# Patient Record
Sex: FEMALE | Race: WHITE | NOT HISPANIC OR LATINO
[De-identification: names, ages, dates, MRNs, and addresses within clinical notes are randomized per-mention and may not be internally consistent; named-entity substitution may affect disease eponyms.]

---

## 2017-05-01 ENCOUNTER — TRANSCRIPTION ENCOUNTER (OUTPATIENT)
Age: 72
End: 2017-05-01

## 2017-05-17 ENCOUNTER — TRANSCRIPTION ENCOUNTER (OUTPATIENT)
Age: 72
End: 2017-05-17

## 2017-05-26 ENCOUNTER — TRANSCRIPTION ENCOUNTER (OUTPATIENT)
Age: 72
End: 2017-05-26

## 2017-08-26 ENCOUNTER — EMERGENCY (EMERGENCY)
Facility: HOSPITAL | Age: 72
LOS: 0 days | Discharge: HOME | End: 2017-08-26
Attending: EMERGENCY MEDICINE | Admitting: EMERGENCY MEDICINE
Payer: MEDICARE

## 2017-08-26 VITALS
HEIGHT: 61 IN | DIASTOLIC BLOOD PRESSURE: 90 MMHG | OXYGEN SATURATION: 97 % | TEMPERATURE: 98 F | WEIGHT: 108.03 LBS | RESPIRATION RATE: 18 BRPM | HEART RATE: 91 BPM | SYSTOLIC BLOOD PRESSURE: 128 MMHG

## 2017-08-26 DIAGNOSIS — E03.9 HYPOTHYROIDISM, UNSPECIFIED: ICD-10-CM

## 2017-08-26 DIAGNOSIS — I10 ESSENTIAL (PRIMARY) HYPERTENSION: ICD-10-CM

## 2017-08-26 DIAGNOSIS — I47.1 SUPRAVENTRICULAR TACHYCARDIA: ICD-10-CM

## 2017-08-26 DIAGNOSIS — R00.0 TACHYCARDIA, UNSPECIFIED: ICD-10-CM

## 2017-08-26 DIAGNOSIS — Z88.5 ALLERGY STATUS TO NARCOTIC AGENT: ICD-10-CM

## 2017-08-26 LAB
ALBUMIN SERPL ELPH-MCNC: 3.6 G/DL — SIGNIFICANT CHANGE UP (ref 3.3–5)
ALP SERPL-CCNC: 48 U/L — SIGNIFICANT CHANGE UP (ref 40–120)
ALT FLD-CCNC: 20 U/L — SIGNIFICANT CHANGE UP (ref 12–78)
ANION GAP SERPL CALC-SCNC: 10 MMOL/L — SIGNIFICANT CHANGE UP (ref 5–17)
APPEARANCE UR: CLEAR — SIGNIFICANT CHANGE UP
APTT BLD: 37.6 SEC — HIGH (ref 27.5–37.4)
AST SERPL-CCNC: 16 U/L — SIGNIFICANT CHANGE UP (ref 15–37)
BACTERIA # UR AUTO: (no result)
BASOPHILS # BLD AUTO: 0.1 K/UL — SIGNIFICANT CHANGE UP (ref 0–0.2)
BASOPHILS NFR BLD AUTO: 1.5 % — SIGNIFICANT CHANGE UP (ref 0–2)
BILIRUB SERPL-MCNC: 0.3 MG/DL — SIGNIFICANT CHANGE UP (ref 0.2–1.2)
BILIRUB UR-MCNC: NEGATIVE — SIGNIFICANT CHANGE UP
BUN SERPL-MCNC: 19 MG/DL — SIGNIFICANT CHANGE UP (ref 7–23)
CALCIUM SERPL-MCNC: 9.4 MG/DL — SIGNIFICANT CHANGE UP (ref 8.5–10.1)
CHLORIDE SERPL-SCNC: 113 MMOL/L — HIGH (ref 96–108)
CO2 SERPL-SCNC: 21 MMOL/L — LOW (ref 22–31)
COLOR SPEC: YELLOW — SIGNIFICANT CHANGE UP
CREAT SERPL-MCNC: 0.84 MG/DL — SIGNIFICANT CHANGE UP (ref 0.5–1.3)
D DIMER BLD IA.RAPID-MCNC: 165 NG/ML DDU — SIGNIFICANT CHANGE UP
DIFF PNL FLD: NEGATIVE — SIGNIFICANT CHANGE UP
EOSINOPHIL # BLD AUTO: 0.1 K/UL — SIGNIFICANT CHANGE UP (ref 0–0.5)
EOSINOPHIL NFR BLD AUTO: 1.2 % — SIGNIFICANT CHANGE UP (ref 0–6)
EPI CELLS # UR: SIGNIFICANT CHANGE UP
GLUCOSE SERPL-MCNC: 142 MG/DL — HIGH (ref 70–99)
GLUCOSE UR QL: NEGATIVE MG/DL — SIGNIFICANT CHANGE UP
HCT VFR BLD CALC: 40.1 % — SIGNIFICANT CHANGE UP (ref 34.5–45)
HGB BLD-MCNC: 14 G/DL — SIGNIFICANT CHANGE UP (ref 11.5–15.5)
INR BLD: 0.99 RATIO — SIGNIFICANT CHANGE UP (ref 0.88–1.16)
KETONES UR-MCNC: NEGATIVE — SIGNIFICANT CHANGE UP
LACTATE SERPL-SCNC: 1.6 MMOL/L — SIGNIFICANT CHANGE UP (ref 0.7–2)
LEUKOCYTE ESTERASE UR-ACNC: (no result)
LYMPHOCYTES # BLD AUTO: 2.5 K/UL — SIGNIFICANT CHANGE UP (ref 1–3.3)
LYMPHOCYTES # BLD AUTO: 40.5 % — SIGNIFICANT CHANGE UP (ref 13–44)
MAGNESIUM SERPL-MCNC: 2 MG/DL — SIGNIFICANT CHANGE UP (ref 1.6–2.6)
MCHC RBC-ENTMCNC: 31.2 PG — SIGNIFICANT CHANGE UP (ref 27–34)
MCHC RBC-ENTMCNC: 34.8 GM/DL — SIGNIFICANT CHANGE UP (ref 32–36)
MCV RBC AUTO: 89.5 FL — SIGNIFICANT CHANGE UP (ref 80–100)
MONOCYTES # BLD AUTO: 0.5 K/UL — SIGNIFICANT CHANGE UP (ref 0–0.9)
MONOCYTES NFR BLD AUTO: 8.8 % — SIGNIFICANT CHANGE UP (ref 2–14)
NEUTROPHILS # BLD AUTO: 3 K/UL — SIGNIFICANT CHANGE UP (ref 1.8–7.4)
NEUTROPHILS NFR BLD AUTO: 48.1 % — SIGNIFICANT CHANGE UP (ref 43–77)
NITRITE UR-MCNC: NEGATIVE — SIGNIFICANT CHANGE UP
PH UR: 6.5 — SIGNIFICANT CHANGE UP (ref 5–8)
PLATELET # BLD AUTO: 292 K/UL — SIGNIFICANT CHANGE UP (ref 150–400)
POTASSIUM SERPL-MCNC: 3.9 MMOL/L — SIGNIFICANT CHANGE UP (ref 3.5–5.3)
POTASSIUM SERPL-SCNC: 3.9 MMOL/L — SIGNIFICANT CHANGE UP (ref 3.5–5.3)
PROT SERPL-MCNC: 7.1 GM/DL — SIGNIFICANT CHANGE UP (ref 6–8.3)
PROT UR-MCNC: NEGATIVE MG/DL — SIGNIFICANT CHANGE UP
PROTHROM AB SERPL-ACNC: 10.7 SEC — SIGNIFICANT CHANGE UP (ref 9.8–12.7)
RBC # BLD: 4.48 M/UL — SIGNIFICANT CHANGE UP (ref 3.8–5.2)
RBC # FLD: 11.7 % — SIGNIFICANT CHANGE UP (ref 10.3–14.5)
RBC CASTS # UR COMP ASSIST: NEGATIVE /HPF — SIGNIFICANT CHANGE UP (ref 0–4)
SODIUM SERPL-SCNC: 144 MMOL/L — SIGNIFICANT CHANGE UP (ref 135–145)
SP GR SPEC: 1.01 — SIGNIFICANT CHANGE UP (ref 1.01–1.02)
TROPONIN I SERPL-MCNC: 0.03 NG/ML — SIGNIFICANT CHANGE UP (ref 0.01–0.04)
TROPONIN I SERPL-MCNC: <0.015 NG/ML — SIGNIFICANT CHANGE UP (ref 0.01–0.04)
TSH SERPL-MCNC: 0.01 UIU/ML — LOW (ref 0.36–3.74)
UROBILINOGEN FLD QL: NEGATIVE MG/DL — SIGNIFICANT CHANGE UP
WBC # BLD: 6.2 K/UL — SIGNIFICANT CHANGE UP (ref 3.8–10.5)
WBC # FLD AUTO: 6.2 K/UL — SIGNIFICANT CHANGE UP (ref 3.8–10.5)
WBC UR QL: SIGNIFICANT CHANGE UP

## 2017-08-26 PROCEDURE — 99284 EMERGENCY DEPT VISIT MOD MDM: CPT | Mod: 25

## 2017-08-26 PROCEDURE — 93010 ELECTROCARDIOGRAM REPORT: CPT

## 2017-08-26 PROCEDURE — 71010: CPT | Mod: 26

## 2017-08-26 NOTE — ED ADULT NURSE NOTE - ED STAT RN HANDOFF DETAILS
Received report from MARTY Syed and reassessed patient. Agree with the previous RN assessment. Patient is in NSR, reports no repeat or new episodes of palpitations, shortness of breath, or chest pain. Patient is speaking with family at bedside, awaiting repeat sets of cardiac enzymes. Will continue to monitor/reassess.

## 2017-08-26 NOTE — ED PROVIDER NOTE - OBJECTIVE STATEMENT
72 y/o female with PMHx of  HTN, hypothyroidism presents to the ED c/o rapid heart beat beginning 3 hours ago.  Heart rate suddenly increased to 165 bpm.  This has happened before twice in the past, once when she had to be given adenosine.  +SOB, +palpitations.  Denies any pain.  She's had a cardiac stress test with no complications.  Has not taken ASA.  Nonsmoker, no drinking . Cardiologist Dr. Alvarez.

## 2018-01-08 ENCOUNTER — TRANSCRIPTION ENCOUNTER (OUTPATIENT)
Age: 73
End: 2018-01-08

## 2018-02-24 NOTE — ED ADULT NURSE REASSESSMENT NOTE - COMFORT CARE
side rails up/EKG DONE. MD SIGNED. NOTHING ELSE NEEDED.- BG
plan of care explained
plan of care explained/warm blanket provided/assisted to bedpan
mouth

## 2018-05-03 ENCOUNTER — TRANSCRIPTION ENCOUNTER (OUTPATIENT)
Age: 73
End: 2018-05-03

## 2018-05-05 ENCOUNTER — TRANSCRIPTION ENCOUNTER (OUTPATIENT)
Age: 73
End: 2018-05-05

## 2019-07-22 ENCOUNTER — TRANSCRIPTION ENCOUNTER (OUTPATIENT)
Age: 74
End: 2019-07-22

## 2019-12-26 ENCOUNTER — TRANSCRIPTION ENCOUNTER (OUTPATIENT)
Age: 74
End: 2019-12-26

## 2020-08-22 ENCOUNTER — TRANSCRIPTION ENCOUNTER (OUTPATIENT)
Age: 75
End: 2020-08-22

## 2020-08-22 ENCOUNTER — EMERGENCY (EMERGENCY)
Facility: HOSPITAL | Age: 75
LOS: 0 days | Discharge: HOME | End: 2020-08-22
Attending: STUDENT IN AN ORGANIZED HEALTH CARE EDUCATION/TRAINING PROGRAM
Payer: MEDICARE

## 2020-08-22 VITALS
RESPIRATION RATE: 18 BRPM | WEIGHT: 100.09 LBS | HEIGHT: 60 IN | DIASTOLIC BLOOD PRESSURE: 87 MMHG | OXYGEN SATURATION: 98 % | TEMPERATURE: 99 F | HEART RATE: 66 BPM | SYSTOLIC BLOOD PRESSURE: 147 MMHG
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
## 2020-08-22 DIAGNOSIS — Z88.5 ALLERGY STATUS TO NARCOTIC AGENT: ICD-10-CM
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
## 2020-08-22 DIAGNOSIS — S09.90XA UNSPECIFIED INJURY OF HEAD, INITIAL ENCOUNTER: ICD-10-CM
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
## 2020-08-22 DIAGNOSIS — R11.0 NAUSEA: ICD-10-CM

## 2020-08-22 DIAGNOSIS — Z79.818 LONG TERM (CURRENT) USE OF OTHER AGENTS AFFECTING ESTROGEN RECEPTORS AND ESTROGEN LEVELS: ICD-10-CM

## 2020-08-22 DIAGNOSIS — E03.9 HYPOTHYROIDISM, UNSPECIFIED: ICD-10-CM

## 2020-08-22 DIAGNOSIS — R51 HEADACHE: ICD-10-CM

## 2020-08-22 DIAGNOSIS — I10 ESSENTIAL (PRIMARY) HYPERTENSION: ICD-10-CM

## 2020-08-22 PROCEDURE — 70450 CT HEAD/BRAIN W/O DYE: CPT

## 2020-08-22 PROCEDURE — 99284 EMERGENCY DEPT VISIT MOD MDM: CPT

## 2020-08-22 PROCEDURE — 99284 EMERGENCY DEPT VISIT MOD MDM: CPT | Mod: 25

## 2020-08-22 PROCEDURE — 70450 CT HEAD/BRAIN W/O DYE: CPT | Mod: 26

## 2020-08-22 RX ORDER — ACETAMINOPHEN 500 MG
650 TABLET ORAL ONCE
Refills: 0 | Status: COMPLETED | OUTPATIENT
Start: 2020-08-22 | End: 2020-08-22

## 2020-08-22 RX ADMIN — Medication 650 MILLIGRAM(S): at 10:50

## 2020-08-22 NOTE — ED PROVIDER NOTE - CLINICAL SUMMARY MEDICAL DECISION MAKING FREE TEXT BOX
73 y/o female with closed head injury, CT, pain control. 73 y/o female with closed head injury, CT head, pain control.

## 2020-08-22 NOTE — ED ADULT NURSE NOTE - NSIMPLEMENTINTERV_GEN_ALL_ED
Implemented All Universal Safety Interventions:  [place] to call system. Call bell, personal items and telephone within reach. Instruct patient to call for assistance. Room bathroom lighting operational. Non-slip footwear when patient is off stretcher. Physically safe environment: no spills, clutter or unnecessary equipment. Stretcher in lowest position, wheels locked, appropriate side rails in place.

## 2020-08-22 NOTE — ED ADULT TRIAGE NOTE - CHIEF COMPLAINT QUOTE
pt presents to Ed with complaints of head pain. pt states she hit her head on something yesterday while cleaning her house.

## 2020-08-22 NOTE — ED PROVIDER NOTE - PROGRESS NOTE DETAILS
Aaron ROACH: Patient is feeling better at this time; ct head neg; tylenol prn pain; ambulatory in ed with steady gait; f/u with pmd in 1-2 days without fail; strict return precautions given.

## 2020-08-22 NOTE — ED PROVIDER NOTE - PATIENT PORTAL LINK FT
You can access the FollowMyHealth Patient Portal offered by [facility] by registering at the following website: [email or web address]. By joining [employer]’s FollowMyHealth portal, you will also be able to view your health information using other applications (apps) compatible with our system.

## 2020-08-22 NOTE — ED PROVIDER NOTE - OBJECTIVE STATEMENT
73 y/o female with a PMHx of HTN, Hypothyroidism on Synthroid, presents to the ED c/o head injury yesterday. Pt states she hit her head against "something" while cleaning her basement. No LOC. Reports pain to R side of her head, radiating down to R shoulder, and pain is worsening. +associated nausea. No new vision changes, vomiting. No Took Tylenol yesterday. Not on blood thinners or aspirin.

## 2020-08-22 NOTE — ED ADULT NURSE NOTE - OBJECTIVE STATEMENT
patient sates she hit the top of her head yesterday while cleaning.  She denies LOC, dizziness (has vertigo at baseline), vision changes, no blood thinners.  She has mild nausea.  +HA

## 2020-12-24 ENCOUNTER — TRANSCRIPTION ENCOUNTER (OUTPATIENT)
Age: 75
End: 2020-12-24

## 2020-12-24 ENCOUNTER — EMERGENCY (EMERGENCY)
Facility: HOSPITAL | Age: 75
LOS: 0 days | Discharge: HOME | End: 2020-12-24
Attending: EMERGENCY MEDICINE
Payer: MEDICARE

## 2020-12-24 VITALS
RESPIRATION RATE: 15 BRPM | SYSTOLIC BLOOD PRESSURE: 137 MMHG | HEIGHT: 60 IN | TEMPERATURE: 98 F | OXYGEN SATURATION: 100 % | HEART RATE: 67 BPM | DIASTOLIC BLOOD PRESSURE: 95 MMHG | WEIGHT: 110.01 LBS

## 2020-12-24 VITALS
DIASTOLIC BLOOD PRESSURE: 88 MMHG | OXYGEN SATURATION: 98 % | TEMPERATURE: 98 F | HEART RATE: 61 BPM | RESPIRATION RATE: 17 BRPM | SYSTOLIC BLOOD PRESSURE: 119 MMHG

## 2020-12-24 DIAGNOSIS — M54.2 CERVICALGIA: ICD-10-CM

## 2020-12-24 DIAGNOSIS — R51.9 HEADACHE, UNSPECIFIED: ICD-10-CM

## 2020-12-24 DIAGNOSIS — E03.9 HYPOTHYROIDISM, UNSPECIFIED: ICD-10-CM

## 2020-12-24 DIAGNOSIS — H92.02 OTALGIA, LEFT EAR: ICD-10-CM

## 2020-12-24 DIAGNOSIS — Z88.5 ALLERGY STATUS TO NARCOTIC AGENT: ICD-10-CM

## 2020-12-24 DIAGNOSIS — I10 ESSENTIAL (PRIMARY) HYPERTENSION: ICD-10-CM

## 2020-12-24 DIAGNOSIS — R00.2 PALPITATIONS: ICD-10-CM

## 2020-12-24 LAB
ALBUMIN SERPL ELPH-MCNC: 3.9 G/DL — SIGNIFICANT CHANGE UP (ref 3.3–5)
ALP SERPL-CCNC: 47 U/L — SIGNIFICANT CHANGE UP (ref 40–120)
ALT FLD-CCNC: 22 U/L — SIGNIFICANT CHANGE UP (ref 12–78)
ANION GAP SERPL CALC-SCNC: 5 MMOL/L — SIGNIFICANT CHANGE UP (ref 5–17)
AST SERPL-CCNC: 21 U/L — SIGNIFICANT CHANGE UP (ref 15–37)
BASOPHILS # BLD AUTO: 0.04 K/UL — SIGNIFICANT CHANGE UP (ref 0–0.2)
BASOPHILS NFR BLD AUTO: 0.8 % — SIGNIFICANT CHANGE UP (ref 0–2)
BILIRUB SERPL-MCNC: 1 MG/DL — SIGNIFICANT CHANGE UP (ref 0.2–1.2)
BUN SERPL-MCNC: 16 MG/DL — SIGNIFICANT CHANGE UP (ref 7–23)
CALCIUM SERPL-MCNC: 9.6 MG/DL — SIGNIFICANT CHANGE UP (ref 8.5–10.1)
CHLORIDE SERPL-SCNC: 110 MMOL/L — HIGH (ref 96–108)
CO2 SERPL-SCNC: 26 MMOL/L — SIGNIFICANT CHANGE UP (ref 22–31)
CREAT SERPL-MCNC: 0.79 MG/DL — SIGNIFICANT CHANGE UP (ref 0.5–1.3)
EOSINOPHIL # BLD AUTO: 0.02 K/UL — SIGNIFICANT CHANGE UP (ref 0–0.5)
EOSINOPHIL NFR BLD AUTO: 0.4 % — SIGNIFICANT CHANGE UP (ref 0–6)
GLUCOSE SERPL-MCNC: 87 MG/DL — SIGNIFICANT CHANGE UP (ref 70–99)
HCT VFR BLD CALC: 40.6 % — SIGNIFICANT CHANGE UP (ref 34.5–45)
HGB BLD-MCNC: 13.4 G/DL — SIGNIFICANT CHANGE UP (ref 11.5–15.5)
IMM GRANULOCYTES NFR BLD AUTO: 0.2 % — SIGNIFICANT CHANGE UP (ref 0–1.5)
LYMPHOCYTES # BLD AUTO: 1.9 K/UL — SIGNIFICANT CHANGE UP (ref 1–3.3)
LYMPHOCYTES # BLD AUTO: 39 % — SIGNIFICANT CHANGE UP (ref 13–44)
MAGNESIUM SERPL-MCNC: 2.2 MG/DL — SIGNIFICANT CHANGE UP (ref 1.6–2.6)
MCHC RBC-ENTMCNC: 31.4 PG — SIGNIFICANT CHANGE UP (ref 27–34)
MCHC RBC-ENTMCNC: 33 GM/DL — SIGNIFICANT CHANGE UP (ref 32–36)
MCV RBC AUTO: 95.1 FL — SIGNIFICANT CHANGE UP (ref 80–100)
MONOCYTES # BLD AUTO: 0.49 K/UL — SIGNIFICANT CHANGE UP (ref 0–0.9)
MONOCYTES NFR BLD AUTO: 10.1 % — SIGNIFICANT CHANGE UP (ref 2–14)
NEUTROPHILS # BLD AUTO: 2.41 K/UL — SIGNIFICANT CHANGE UP (ref 1.8–7.4)
NEUTROPHILS NFR BLD AUTO: 49.5 % — SIGNIFICANT CHANGE UP (ref 43–77)
PHOSPHATE SERPL-MCNC: 3.2 MG/DL — SIGNIFICANT CHANGE UP (ref 2.5–4.5)
PLATELET # BLD AUTO: 269 K/UL — SIGNIFICANT CHANGE UP (ref 150–400)
POTASSIUM SERPL-MCNC: 4.1 MMOL/L — SIGNIFICANT CHANGE UP (ref 3.5–5.3)
POTASSIUM SERPL-SCNC: 4.1 MMOL/L — SIGNIFICANT CHANGE UP (ref 3.5–5.3)
PROT SERPL-MCNC: 7.4 GM/DL — SIGNIFICANT CHANGE UP (ref 6–8.3)
RBC # BLD: 4.27 M/UL — SIGNIFICANT CHANGE UP (ref 3.8–5.2)
RBC # FLD: 13 % — SIGNIFICANT CHANGE UP (ref 10.3–14.5)
SODIUM SERPL-SCNC: 141 MMOL/L — SIGNIFICANT CHANGE UP (ref 135–145)
TROPONIN I SERPL-MCNC: <0.015 NG/ML — SIGNIFICANT CHANGE UP (ref 0.01–0.04)
WBC # BLD: 4.87 K/UL — SIGNIFICANT CHANGE UP (ref 3.8–10.5)
WBC # FLD AUTO: 4.87 K/UL — SIGNIFICANT CHANGE UP (ref 3.8–10.5)

## 2020-12-24 PROCEDURE — 71046 X-RAY EXAM CHEST 2 VIEWS: CPT | Mod: 26

## 2020-12-24 PROCEDURE — 71046 X-RAY EXAM CHEST 2 VIEWS: CPT

## 2020-12-24 PROCEDURE — 72125 CT NECK SPINE W/O DYE: CPT | Mod: 26

## 2020-12-24 PROCEDURE — 84100 ASSAY OF PHOSPHORUS: CPT

## 2020-12-24 PROCEDURE — 70450 CT HEAD/BRAIN W/O DYE: CPT | Mod: 26

## 2020-12-24 PROCEDURE — 83880 ASSAY OF NATRIURETIC PEPTIDE: CPT

## 2020-12-24 PROCEDURE — 70450 CT HEAD/BRAIN W/O DYE: CPT

## 2020-12-24 PROCEDURE — 99284 EMERGENCY DEPT VISIT MOD MDM: CPT | Mod: 25

## 2020-12-24 PROCEDURE — 36415 COLL VENOUS BLD VENIPUNCTURE: CPT

## 2020-12-24 PROCEDURE — 72125 CT NECK SPINE W/O DYE: CPT

## 2020-12-24 PROCEDURE — 99284 EMERGENCY DEPT VISIT MOD MDM: CPT

## 2020-12-24 PROCEDURE — 83735 ASSAY OF MAGNESIUM: CPT

## 2020-12-24 PROCEDURE — 84484 ASSAY OF TROPONIN QUANT: CPT | Mod: 91

## 2020-12-24 PROCEDURE — 80053 COMPREHEN METABOLIC PANEL: CPT

## 2020-12-24 PROCEDURE — 85025 COMPLETE CBC W/AUTO DIFF WBC: CPT

## 2020-12-24 RX ORDER — CYCLOBENZAPRINE HYDROCHLORIDE 10 MG/1
10 TABLET, FILM COATED ORAL ONCE
Refills: 0 | Status: COMPLETED | OUTPATIENT
Start: 2020-12-24 | End: 2020-12-24

## 2020-12-24 RX ORDER — ACETAMINOPHEN 500 MG
1000 TABLET ORAL ONCE
Refills: 0 | Status: COMPLETED | OUTPATIENT
Start: 2020-12-24 | End: 2020-12-24

## 2020-12-24 RX ORDER — METHOCARBAMOL 500 MG/1
1500 TABLET, FILM COATED ORAL
Qty: 24 | Refills: 0
Start: 2020-12-24 | End: 2020-12-26

## 2020-12-24 RX ORDER — MECLIZINE HCL 12.5 MG
1 TABLET ORAL
Qty: 8 | Refills: 0
Start: 2020-12-24 | End: 2020-12-25

## 2020-12-24 RX ORDER — METHOCARBAMOL 500 MG/1
1 TABLET, FILM COATED ORAL
Qty: 12 | Refills: 0
Start: 2020-12-24 | End: 2020-12-26

## 2020-12-24 RX ADMIN — CYCLOBENZAPRINE HYDROCHLORIDE 10 MILLIGRAM(S): 10 TABLET, FILM COATED ORAL at 12:34

## 2020-12-24 RX ADMIN — Medication 1000 MILLIGRAM(S): at 12:33

## 2020-12-24 NOTE — ED STATDOCS - OBJECTIVE STATEMENT
76 y/o female with pmhx of hypothyroid, and HTN presents to the ED c/o HA radiating down to th neck and arms. Pt states that the pain started in her L ear x1 month ago. moving head worsens pain. pt states that the pain starts when she wakes up in the morning and worsens as the day goes on. Pt was seen at  today and was sent to the ED for further eval. Noise worsens pain.  Endorses SOB and palpitations. EKG at  was NSR 60. Denies V/D, fever. No other complaints at this time.

## 2020-12-24 NOTE — ED STATDOCS - PATIENT PORTAL LINK FT
You can access the FollowMyHealth Patient Portal offered by [facility] by registering at the following website: [email or web address]. By joining [employer]’s FollowMyHealth portal, you will also be able to view your health information using other applications (apps) compatible with our system.

## 2020-12-24 NOTE — ED STATDOCS - MUSCULOSKELETAL, MLM
range of motion is not limited and there is no muscle tenderness. +ttp L trapezius muscle range of motion is not limited and there is no muscle tenderness. +ttp L trapezius muscle, no midline neck tenderness no reginity range of motion is not limited and there is no muscle tenderness. +ttp L trapezius muscle, no midline neck tenderness no rigidity

## 2020-12-24 NOTE — ED STATDOCS - NSFOLLOWUPINSTRUCTIONS_ED_ALL_ED_FT
Please follow up with Primary MD in 1-2 days. Return to ED immediately for any new or concerning symptoms or worsening symptoms    Acute Headache    WHAT YOU NEED TO KNOW:    An acute headache is pain or discomfort that starts suddenly and gets worse quickly. You may have an acute headache only when you feel stress or eat certain foods. Other acute headache pain can happen every day, and sometimes several times a day.     DISCHARGE INSTRUCTIONS:    Return to the emergency department if:     You have severe pain.      You have numbness or weakness on one side of your face or body.      You have a headache that occurs after a blow to the head, a fall, or other trauma.       You have a headache, are forgetful or confused, or have trouble speaking.      You have a headache, stiff neck, and a fever.    Contact your healthcare provider if:     You have a constant headache and are vomiting.      You have a headache each day that does not get better, even after treatment.      You have changes in your headaches, or new symptoms that occur when you have a headache.      You have questions or concerns about your condition or care.    Medicines: You may need any of the following:     Prescription pain medicine may be given. The medicine your healthcare provider recommends will depend on the kind of headaches you have. You will need to take prescription headache medicines as directed to prevent a problem called rebound headache. These headaches happen with regular use of pain relievers for headache disorders.      NSAIDs, such as ibuprofen, help decrease swelling, pain, and fever. This medicine is available with or without a doctor's order. NSAIDs can cause stomach bleeding or kidney problems in certain people. If you take blood thinner medicine, always ask your healthcare provider if NSAIDs are safe for you. Always read the medicine label and follow directions.      Acetaminophen decreases pain and fever. It is available without a doctor's order. Ask how much to take and how often to take it. Follow directions. Read the labels of all other medicines you are using to see if they also contain acetaminophen, or ask your doctor or pharmacist. Acetaminophen can cause liver damage if not taken correctly. Do not use more than 3 grams (3,000 milligrams) total of acetaminophen in one day.       Antidepressants may be given for some kinds of headaches.       Take your medicine as directed. Contact your healthcare provider if you think your medicine is not helping or if you have side effects. Tell him or her if you are allergic to any medicine. Keep a list of the medicines, vitamins, and herbs you take. Include the amounts, and when and why you take them. Bring the list or the pill bottles to follow-up visits. Carry your medicine list with you in case of an emergency.    Manage your symptoms:     Apply heat or ice on the headache area. Use a heat or ice pack. For an ice pack, you can also put crushed ice in a plastic bag. Cover the pack or bag with a towel before you apply it to your skin. Ice and heat both help decrease pain, and heat also helps decrease muscle spasms. Apply heat for 20 to 30 minutes every 2 hours. Apply ice for 15 to 20 minutes every hour. Apply heat or ice for as long and for as many days as directed. You may alternate heat and ice.      Relax your muscles. Lie down in a comfortable position and close your eyes. Relax your muscles slowly. Start at your toes and work your way up your body.      Keep a record of your headaches. Write down when your headaches start and stop. Include your symptoms and what you were doing when the headache began. Record what you ate or drank for 24 hours before the headache started. Describe the pain and where it hurts. Keep track of what you did to treat your headache and if it worked.     Prevent an acute headache:     Avoid anything that triggers an acute headache. Examples include exposure to chemicals, going to high altitude, or not getting enough sleep. Create a regular sleep routine. Go to sleep at the same time and wake up at the same time each day. Do not use electronic devices before bedtime. These may trigger a headache or prevent you from sleeping well.      Do not smoke. Nicotine and other chemicals in cigarettes and cigars can trigger an acute headache or make it worse. Ask your healthcare provider for information if you currently smoke and need help to quit. E-cigarettes or smokeless tobacco still contain nicotine. Talk to your healthcare provider before you use these products.       Limit alcohol as directed. Alcohol can trigger an acute headache or make it worse. If you have cluster headaches, do not drink alcohol during an episode. For other types of headaches, ask your healthcare provider if it is safe for you to drink alcohol. Ask how much is safe for you to drink, and how often.      Exercise as directed. Exercise can reduce tension and help with headache pain. Aim for 30 minutes of physical activity on most days of the week. Your healthcare provider can help you create an exercise plan.      Eat a variety of healthy foods. Healthy foods include fruits, vegetables, low-fat dairy products, lean meats, fish, whole grains, and cooked beans. Your healthcare provider or dietitian can help you create meals plans if you need to avoid foods that trigger headaches.    Follow up with your healthcare provider as directed: Bring your headache record with you when you see your healthcare provider. Write down your questions so you remember to ask them during your visits.

## 2020-12-24 NOTE — ED STATDOCS - CLINICAL SUMMARY MEDICAL DECISION MAKING FREE TEXT BOX
Pt presents with HA associate with neck pain and palpitations, plan: CT, check labs, follow up with PCP.

## 2020-12-24 NOTE — ED STATDOCS - CARE PROVIDER_API CALL
Ronen Douglas)  Orthopaedic Surgery  [address] [place] [address]  Phone: [phone]  Fax: [phone]  Follow Up Time: 7-10 Days

## 2020-12-24 NOTE — ED STATDOCS - PROGRESS NOTE DETAILS
76 y/o F presents with HA x 1 week. Pt reports intermittent HA and neck pain radiating down to her shoulder, pain is worse with movement and becomes worse during the day. Denies fever/chills, n/v, slurred speech, weakness, numbness or tingling, CP, SOB, abd pain, or other complaints at this time. -James Connell PA-C Results reviewed and discussed with pt. Pt feeling better, will dc with robaxin. Discussed importance of close FU with PMD. Pt asked to return to ED immediately for any new or concerning sx or worsening. Pt acknowledges and understands plan -James Connell PA-C

## 2020-12-24 NOTE — ED ADULT TRIAGE NOTE - CHIEF COMPLAINT QUOTE
Pt sent by  for further evaluation of HA, ear pain (ruled out OM at UC), pain radiating down her left arm x 1 week.  denies chest pain, sob.

## 2020-12-24 NOTE — ED STATDOCS - ATTENDING CONTRIBUTION TO CARE
I, Walter Mckeon MD,  performed the initial face to face bedside interview with this patient regarding history of present illness, review of symptoms and relevant past medical, social and family history.  I completed an independent physical examination.  I was the initial provider who evaluated this patient. I have signed out the follow up of any pending tests (i.e. labs, radiological studies) to the ACP.  I have communicated the patient’s plan of care and disposition with the ACP.  The history, relevant review of systems, past medical and surgical history, medical decision making, and physical examination was documented by the scribe in my presence and I attest to the accuracy of the documentation.

## 2021-02-04 ENCOUNTER — TRANSCRIPTION ENCOUNTER (OUTPATIENT)
Age: 76
End: 2021-02-04

## 2021-03-13 ENCOUNTER — TRANSCRIPTION ENCOUNTER (OUTPATIENT)
Age: 76
End: 2021-03-13

## 2021-04-19 ENCOUNTER — TRANSCRIPTION ENCOUNTER (OUTPATIENT)
Age: 76
End: 2021-04-19

## 2021-04-27 PROBLEM — Z00.00 ENCOUNTER FOR PREVENTIVE HEALTH EXAMINATION: Status: ACTIVE | Noted: 2021-04-27

## 2021-05-06 ENCOUNTER — APPOINTMENT (OUTPATIENT)
Dept: OTOLARYNGOLOGY | Facility: CLINIC | Age: 76
End: 2021-05-06
Payer: MEDICARE

## 2021-05-06 DIAGNOSIS — E07.9 DISORDER OF THYROID, UNSPECIFIED: ICD-10-CM

## 2021-05-06 DIAGNOSIS — I51.9 HEART DISEASE, UNSPECIFIED: ICD-10-CM

## 2021-05-06 DIAGNOSIS — Z84.89 FAMILY HISTORY OF OTHER SPECIFIED CONDITIONS: ICD-10-CM

## 2021-05-06 DIAGNOSIS — Z80.9 FAMILY HISTORY OF MALIGNANT NEOPLASM, UNSPECIFIED: ICD-10-CM

## 2021-05-06 PROCEDURE — G0268 REMOVAL OF IMPACTED WAX MD: CPT | Mod: LT

## 2021-05-06 PROCEDURE — 92557 COMPREHENSIVE HEARING TEST: CPT

## 2021-05-06 PROCEDURE — 92567 TYMPANOMETRY: CPT

## 2021-05-06 PROCEDURE — 99204 OFFICE O/P NEW MOD 45 MIN: CPT | Mod: 25

## 2021-05-06 RX ORDER — LEVOTHYROXINE SODIUM 137 UG/1
TABLET ORAL
Refills: 0 | Status: ACTIVE | COMMUNITY

## 2021-05-06 NOTE — HISTORY OF PRESENT ILLNESS
[de-identified] : co hearing loss as x 3-4 weeks hum noise\par as plugging\par co echo as\par neg pmh re ears\par posterior neck pain chronic

## 2021-05-06 NOTE — REVIEW OF SYSTEMS
[Seasonal Allergies] : seasonal allergies [Dizziness] : dizziness [Vertigo] : vertigo [Lightheadedness] : lightheadedness [Nasal Congestion] : nasal congestion [Recurrent Sinus Infections] : recurrent sinus infections [Discolored Nasal Discharge] : discolored nasal discharge [Throat Pain] : throat pain [Palpitations] : palpitations [Shortness Of Breath] : shortness of breath [Easy Bleeding] : a tendency for easy bleeding [Negative] : Heme/Lymph [Patient Intake Form Reviewed] : Patient intake form was reviewed [FreeTextEntry1] : muscle aches

## 2021-05-06 NOTE — ASSESSMENT
[FreeTextEntry1] : cerumen cleared as\par audio as w decrased hearing 1500 hz, a/a tymps\par ?sudden as hearing loss\par pred 20 bid and taper\par fu audio 2 weeks

## 2021-05-06 NOTE — PROCEDURE
[Cerumen Impaction] : Cerumen Impaction [FreeTextEntry6] : Large amount cerumen cleared left and right ear instrumentation with curettes, forceps and suction.\par Ear canals and tympanic membranes  unremarkable.\par \par

## 2021-05-20 ENCOUNTER — APPOINTMENT (OUTPATIENT)
Dept: OTOLARYNGOLOGY | Facility: CLINIC | Age: 76
End: 2021-05-20
Payer: MEDICARE

## 2021-05-20 VITALS — WEIGHT: 98 LBS | TEMPERATURE: 97.9 F | BODY MASS INDEX: 18.5 KG/M2 | HEIGHT: 61 IN

## 2021-05-20 PROCEDURE — 92567 TYMPANOMETRY: CPT

## 2021-05-20 PROCEDURE — 92557 COMPREHENSIVE HEARING TEST: CPT

## 2021-05-20 PROCEDURE — 99213 OFFICE O/P EST LOW 20 MIN: CPT

## 2021-05-20 NOTE — HISTORY OF PRESENT ILLNESS
[de-identified] : fu re as sudden loss\par as tinnitus like wind\par now worse\par completed pred rx\par

## 2021-05-20 NOTE — ASSESSMENT
[FreeTextEntry1] : exam unremarkable\par audio as w decreased hearing at 1500 3000 hz\par rec mri iac ro acoustic lesion\par consult Dr. Linares

## 2021-05-24 ENCOUNTER — INPATIENT (INPATIENT)
Facility: HOSPITAL | Age: 76
LOS: 5 days | Discharge: HOME HEALTH | DRG: 192 | End: 2021-05-30
Attending: STUDENT IN AN ORGANIZED HEALTH CARE EDUCATION/TRAINING PROGRAM | Admitting: SURGERY
Payer: COMMERCIAL

## 2021-05-24 VITALS
DIASTOLIC BLOOD PRESSURE: 101 MMHG | OXYGEN SATURATION: 96 % | RESPIRATION RATE: 15 BRPM | WEIGHT: 98.11 LBS | TEMPERATURE: 98 F | SYSTOLIC BLOOD PRESSURE: 144 MMHG | HEART RATE: 87 BPM | HEIGHT: 60 IN

## 2021-05-24 DIAGNOSIS — S22.20XA UNSPECIFIED FRACTURE OF STERNUM, INITIAL ENCOUNTER FOR CLOSED FRACTURE: ICD-10-CM

## 2021-05-24 DIAGNOSIS — Z98.890 OTHER SPECIFIED POSTPROCEDURAL STATES: Chronic | ICD-10-CM

## 2021-05-24 LAB
ALBUMIN SERPL ELPH-MCNC: 3.6 G/DL — SIGNIFICANT CHANGE UP (ref 3.3–5)
ALP SERPL-CCNC: 43 U/L — SIGNIFICANT CHANGE UP (ref 40–120)
ALT FLD-CCNC: 23 U/L — SIGNIFICANT CHANGE UP (ref 12–78)
ANION GAP SERPL CALC-SCNC: 8 MMOL/L — SIGNIFICANT CHANGE UP (ref 5–17)
APPEARANCE UR: CLEAR — SIGNIFICANT CHANGE UP
APTT BLD: 32.9 SEC — SIGNIFICANT CHANGE UP (ref 27.5–35.5)
AST SERPL-CCNC: 19 U/L — SIGNIFICANT CHANGE UP (ref 15–37)
BASOPHILS # BLD AUTO: 0.03 K/UL — SIGNIFICANT CHANGE UP (ref 0–0.2)
BASOPHILS NFR BLD AUTO: 0.5 % — SIGNIFICANT CHANGE UP (ref 0–2)
BILIRUB SERPL-MCNC: 1 MG/DL — SIGNIFICANT CHANGE UP (ref 0.2–1.2)
BILIRUB UR-MCNC: NEGATIVE — SIGNIFICANT CHANGE UP
BUN SERPL-MCNC: 18 MG/DL — SIGNIFICANT CHANGE UP (ref 7–23)
CALCIUM SERPL-MCNC: 9.5 MG/DL — SIGNIFICANT CHANGE UP (ref 8.5–10.1)
CHLORIDE SERPL-SCNC: 108 MMOL/L — SIGNIFICANT CHANGE UP (ref 96–108)
CO2 SERPL-SCNC: 25 MMOL/L — SIGNIFICANT CHANGE UP (ref 22–31)
COLOR SPEC: YELLOW — SIGNIFICANT CHANGE UP
CREAT SERPL-MCNC: 1.01 MG/DL — SIGNIFICANT CHANGE UP (ref 0.5–1.3)
DIFF PNL FLD: NEGATIVE — SIGNIFICANT CHANGE UP
EOSINOPHIL # BLD AUTO: 0.06 K/UL — SIGNIFICANT CHANGE UP (ref 0–0.5)
EOSINOPHIL NFR BLD AUTO: 0.9 % — SIGNIFICANT CHANGE UP (ref 0–6)
ETHANOL SERPL-MCNC: <10 MG/DL — SIGNIFICANT CHANGE UP (ref 0–10)
GLUCOSE SERPL-MCNC: 150 MG/DL — HIGH (ref 70–99)
GLUCOSE UR QL: NEGATIVE MG/DL — SIGNIFICANT CHANGE UP
HCT VFR BLD CALC: 37.8 % — SIGNIFICANT CHANGE UP (ref 34.5–45)
HGB BLD-MCNC: 13 G/DL — SIGNIFICANT CHANGE UP (ref 11.5–15.5)
IMM GRANULOCYTES NFR BLD AUTO: 0.6 % — SIGNIFICANT CHANGE UP (ref 0–1.5)
INR BLD: 1.01 RATIO — SIGNIFICANT CHANGE UP (ref 0.88–1.16)
KETONES UR-MCNC: NEGATIVE — SIGNIFICANT CHANGE UP
LACTATE SERPL-SCNC: 2.1 MMOL/L — HIGH (ref 0.7–2)
LEUKOCYTE ESTERASE UR-ACNC: NEGATIVE — SIGNIFICANT CHANGE UP
LIDOCAIN IGE QN: 128 U/L — SIGNIFICANT CHANGE UP (ref 73–393)
LYMPHOCYTES # BLD AUTO: 2.04 K/UL — SIGNIFICANT CHANGE UP (ref 1–3.3)
LYMPHOCYTES # BLD AUTO: 32 % — SIGNIFICANT CHANGE UP (ref 13–44)
MCHC RBC-ENTMCNC: 31.6 PG — SIGNIFICANT CHANGE UP (ref 27–34)
MCHC RBC-ENTMCNC: 34.4 GM/DL — SIGNIFICANT CHANGE UP (ref 32–36)
MCV RBC AUTO: 91.7 FL — SIGNIFICANT CHANGE UP (ref 80–100)
MONOCYTES # BLD AUTO: 0.42 K/UL — SIGNIFICANT CHANGE UP (ref 0–0.9)
MONOCYTES NFR BLD AUTO: 6.6 % — SIGNIFICANT CHANGE UP (ref 2–14)
NEUTROPHILS # BLD AUTO: 3.78 K/UL — SIGNIFICANT CHANGE UP (ref 1.8–7.4)
NEUTROPHILS NFR BLD AUTO: 59.4 % — SIGNIFICANT CHANGE UP (ref 43–77)
NITRITE UR-MCNC: NEGATIVE — SIGNIFICANT CHANGE UP
PH UR: 8 — SIGNIFICANT CHANGE UP (ref 5–8)
PLATELET # BLD AUTO: 264 K/UL — SIGNIFICANT CHANGE UP (ref 150–400)
POTASSIUM SERPL-MCNC: 3.7 MMOL/L — SIGNIFICANT CHANGE UP (ref 3.5–5.3)
POTASSIUM SERPL-SCNC: 3.7 MMOL/L — SIGNIFICANT CHANGE UP (ref 3.5–5.3)
PROT SERPL-MCNC: 6.7 GM/DL — SIGNIFICANT CHANGE UP (ref 6–8.3)
PROT UR-MCNC: NEGATIVE MG/DL — SIGNIFICANT CHANGE UP
PROTHROM AB SERPL-ACNC: 11.7 SEC — SIGNIFICANT CHANGE UP (ref 10.6–13.6)
RBC # BLD: 4.12 M/UL — SIGNIFICANT CHANGE UP (ref 3.8–5.2)
RBC # FLD: 13.2 % — SIGNIFICANT CHANGE UP (ref 10.3–14.5)
SODIUM SERPL-SCNC: 141 MMOL/L — SIGNIFICANT CHANGE UP (ref 135–145)
SP GR SPEC: 1.01 — SIGNIFICANT CHANGE UP (ref 1.01–1.02)
TROPONIN I SERPL-MCNC: <0.015 NG/ML — SIGNIFICANT CHANGE UP (ref 0.01–0.04)
TROPONIN I SERPL-MCNC: <0.015 NG/ML — SIGNIFICANT CHANGE UP (ref 0.01–0.04)
UROBILINOGEN FLD QL: NEGATIVE MG/DL — SIGNIFICANT CHANGE UP
WBC # BLD: 6.37 K/UL — SIGNIFICANT CHANGE UP (ref 3.8–10.5)
WBC # FLD AUTO: 6.37 K/UL — SIGNIFICANT CHANGE UP (ref 3.8–10.5)

## 2021-05-24 PROCEDURE — 93610 INTRA-ATRIAL PACING: CPT | Mod: 26,XU

## 2021-05-24 PROCEDURE — 93612 INTRAVENTRICULAR PACING: CPT | Mod: 26,XU

## 2021-05-24 PROCEDURE — 76376 3D RENDER W/INTRP POSTPROCES: CPT | Mod: 26

## 2021-05-24 PROCEDURE — C1764: CPT

## 2021-05-24 PROCEDURE — 97116 GAIT TRAINING THERAPY: CPT | Mod: GP

## 2021-05-24 PROCEDURE — 72125 CT NECK SPINE W/O DYE: CPT | Mod: 26,MA

## 2021-05-24 PROCEDURE — 85027 COMPLETE CBC AUTOMATED: CPT

## 2021-05-24 PROCEDURE — U0005: CPT

## 2021-05-24 PROCEDURE — 71045 X-RAY EXAM CHEST 1 VIEW: CPT

## 2021-05-24 PROCEDURE — 86803 HEPATITIS C AB TEST: CPT

## 2021-05-24 PROCEDURE — 86769 SARS-COV-2 COVID-19 ANTIBODY: CPT

## 2021-05-24 PROCEDURE — 73610 X-RAY EXAM OF ANKLE: CPT | Mod: 26,RT

## 2021-05-24 PROCEDURE — 93306 TTE W/DOPPLER COMPLETE: CPT

## 2021-05-24 PROCEDURE — 82652 VIT D 1 25-DIHYDROXY: CPT

## 2021-05-24 PROCEDURE — C1730: CPT

## 2021-05-24 PROCEDURE — 99285 EMERGENCY DEPT VISIT HI MDM: CPT

## 2021-05-24 PROCEDURE — 70551 MRI BRAIN STEM W/O DYE: CPT

## 2021-05-24 PROCEDURE — 97530 THERAPEUTIC ACTIVITIES: CPT | Mod: GP

## 2021-05-24 PROCEDURE — 36415 COLL VENOUS BLD VENIPUNCTURE: CPT

## 2021-05-24 PROCEDURE — 86900 BLOOD TYPING SEROLOGIC ABO: CPT

## 2021-05-24 PROCEDURE — 71260 CT THORAX DX C+: CPT | Mod: 26,MA

## 2021-05-24 PROCEDURE — 83605 ASSAY OF LACTIC ACID: CPT

## 2021-05-24 PROCEDURE — 84484 ASSAY OF TROPONIN QUANT: CPT

## 2021-05-24 PROCEDURE — 73610 X-RAY EXAM OF ANKLE: CPT | Mod: RT

## 2021-05-24 PROCEDURE — 33285 INSJ SUBQ CAR RHYTHM MNTR: CPT

## 2021-05-24 PROCEDURE — 86850 RBC ANTIBODY SCREEN: CPT

## 2021-05-24 PROCEDURE — 93618 INDCTJ ARRHYTHMIA ELEC PACG: CPT | Mod: 26

## 2021-05-24 PROCEDURE — 86901 BLOOD TYPING SEROLOGIC RH(D): CPT

## 2021-05-24 PROCEDURE — 70486 CT MAXILLOFACIAL W/O DYE: CPT | Mod: 26,MA

## 2021-05-24 PROCEDURE — 70450 CT HEAD/BRAIN W/O DYE: CPT | Mod: 26,MA

## 2021-05-24 PROCEDURE — 95816 EEG AWAKE AND DROWSY: CPT

## 2021-05-24 PROCEDURE — C1894: CPT

## 2021-05-24 PROCEDURE — 81003 URINALYSIS AUTO W/O SCOPE: CPT

## 2021-05-24 PROCEDURE — 84443 ASSAY THYROID STIM HORMONE: CPT

## 2021-05-24 PROCEDURE — 97162 PT EVAL MOD COMPLEX 30 MIN: CPT | Mod: GP

## 2021-05-24 PROCEDURE — 74177 CT ABD & PELVIS W/CONTRAST: CPT | Mod: 26,MA

## 2021-05-24 PROCEDURE — 93602 INTRA-ATRIAL RECORDING: CPT | Mod: 26

## 2021-05-24 PROCEDURE — U0003: CPT

## 2021-05-24 PROCEDURE — 99221 1ST HOSP IP/OBS SF/LOW 40: CPT

## 2021-05-24 PROCEDURE — 80048 BASIC METABOLIC PNL TOTAL CA: CPT

## 2021-05-24 PROCEDURE — 82607 VITAMIN B-12: CPT

## 2021-05-24 PROCEDURE — 93010 ELECTROCARDIOGRAM REPORT: CPT

## 2021-05-24 PROCEDURE — 93603 RIGHT VENTRICULAR RECORDING: CPT | Mod: 26,XU

## 2021-05-24 RX ORDER — IBUPROFEN 200 MG
400 TABLET ORAL EVERY 8 HOURS
Refills: 0 | Status: COMPLETED | OUTPATIENT
Start: 2021-05-24 | End: 2021-05-27

## 2021-05-24 RX ORDER — ATORVASTATIN CALCIUM 80 MG/1
10 TABLET, FILM COATED ORAL AT BEDTIME
Refills: 0 | Status: DISCONTINUED | OUTPATIENT
Start: 2021-05-24 | End: 2021-05-30

## 2021-05-24 RX ORDER — HYDROMORPHONE HYDROCHLORIDE 2 MG/ML
0.5 INJECTION INTRAMUSCULAR; INTRAVENOUS; SUBCUTANEOUS ONCE
Refills: 0 | Status: DISCONTINUED | OUTPATIENT
Start: 2021-05-24 | End: 2021-05-24

## 2021-05-24 RX ORDER — LEVOTHYROXINE SODIUM 125 MCG
1 TABLET ORAL
Qty: 0 | Refills: 0 | DISCHARGE

## 2021-05-24 RX ORDER — ACETAMINOPHEN 500 MG
650 TABLET ORAL EVERY 6 HOURS
Refills: 0 | Status: DISCONTINUED | OUTPATIENT
Start: 2021-05-24 | End: 2021-05-30

## 2021-05-24 RX ORDER — ONDANSETRON 8 MG/1
4 TABLET, FILM COATED ORAL EVERY 6 HOURS
Refills: 0 | Status: DISCONTINUED | OUTPATIENT
Start: 2021-05-24 | End: 2021-05-30

## 2021-05-24 RX ORDER — LIDOCAINE 4 G/100G
1 CREAM TOPICAL ONCE
Refills: 0 | Status: COMPLETED | OUTPATIENT
Start: 2021-05-24 | End: 2021-05-24

## 2021-05-24 RX ORDER — CETIRIZINE HYDROCHLORIDE 10 MG/1
0 TABLET ORAL
Qty: 0 | Refills: 0 | DISCHARGE

## 2021-05-24 RX ORDER — LEVOTHYROXINE SODIUM 125 MCG
50 TABLET ORAL DAILY
Refills: 0 | Status: DISCONTINUED | OUTPATIENT
Start: 2021-05-24 | End: 2021-05-30

## 2021-05-24 RX ORDER — ACETAMINOPHEN 500 MG
650 TABLET ORAL EVERY 4 HOURS
Refills: 0 | Status: DISCONTINUED | OUTPATIENT
Start: 2021-05-24 | End: 2021-05-30

## 2021-05-24 RX ORDER — LIDOCAINE 4 G/100G
1 CREAM TOPICAL EVERY 24 HOURS
Refills: 0 | Status: DISCONTINUED | OUTPATIENT
Start: 2021-05-24 | End: 2021-05-30

## 2021-05-24 RX ORDER — FAMOTIDINE 10 MG/ML
20 INJECTION INTRAVENOUS EVERY 12 HOURS
Refills: 0 | Status: DISCONTINUED | OUTPATIENT
Start: 2021-05-24 | End: 2021-05-30

## 2021-05-24 RX ORDER — HYDROMORPHONE HYDROCHLORIDE 2 MG/ML
0.5 INJECTION INTRAMUSCULAR; INTRAVENOUS; SUBCUTANEOUS EVERY 4 HOURS
Refills: 0 | Status: DISCONTINUED | OUTPATIENT
Start: 2021-05-24 | End: 2021-05-30

## 2021-05-24 RX ORDER — TRAMADOL HYDROCHLORIDE 50 MG/1
50 TABLET ORAL EVERY 6 HOURS
Refills: 0 | Status: DISCONTINUED | OUTPATIENT
Start: 2021-05-24 | End: 2021-05-30

## 2021-05-24 RX ORDER — SCOPALAMINE 1 MG/3D
1 PATCH, EXTENDED RELEASE TRANSDERMAL
Refills: 0 | Status: DISCONTINUED | OUTPATIENT
Start: 2021-05-24 | End: 2021-05-30

## 2021-05-24 RX ADMIN — HYDROMORPHONE HYDROCHLORIDE 0.5 MILLIGRAM(S): 2 INJECTION INTRAMUSCULAR; INTRAVENOUS; SUBCUTANEOUS at 21:46

## 2021-05-24 RX ADMIN — HYDROMORPHONE HYDROCHLORIDE 0.5 MILLIGRAM(S): 2 INJECTION INTRAMUSCULAR; INTRAVENOUS; SUBCUTANEOUS at 20:04

## 2021-05-24 RX ADMIN — ONDANSETRON 4 MILLIGRAM(S): 8 TABLET, FILM COATED ORAL at 17:31

## 2021-05-24 RX ADMIN — LIDOCAINE 1 PATCH: 4 CREAM TOPICAL at 18:54

## 2021-05-24 RX ADMIN — ONDANSETRON 4 MILLIGRAM(S): 8 TABLET, FILM COATED ORAL at 23:52

## 2021-05-24 RX ADMIN — HYDROMORPHONE HYDROCHLORIDE 0.5 MILLIGRAM(S): 2 INJECTION INTRAMUSCULAR; INTRAVENOUS; SUBCUTANEOUS at 16:04

## 2021-05-24 RX ADMIN — HYDROMORPHONE HYDROCHLORIDE 0.5 MILLIGRAM(S): 2 INJECTION INTRAMUSCULAR; INTRAVENOUS; SUBCUTANEOUS at 16:30

## 2021-05-24 RX ADMIN — HYDROMORPHONE HYDROCHLORIDE 0.5 MILLIGRAM(S): 2 INJECTION INTRAMUSCULAR; INTRAVENOUS; SUBCUTANEOUS at 14:38

## 2021-05-24 RX ADMIN — SCOPALAMINE 1 PATCH: 1 PATCH, EXTENDED RELEASE TRANSDERMAL at 20:53

## 2021-05-24 RX ADMIN — Medication 400 MILLIGRAM(S): at 23:52

## 2021-05-24 RX ADMIN — LIDOCAINE 1 PATCH: 4 CREAM TOPICAL at 14:37

## 2021-05-24 NOTE — ED PROVIDER NOTE - SKIN, MLM
Skin normal color for race, warm, dry and intact. No evidence of rash. Skin normal color for race, warm, dry. No evidence of rash. Skin normal color for race, warm.  No evidence of rash.

## 2021-05-24 NOTE — ED PROVIDER NOTE - CONSTITUTIONAL, MLM
normal... Well appearing, awake, alert, oriented to person, place, time/situation and in no apparent distress. Well appearing, awake, alert, oriented to person, place, time/situation and in mild distress.
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
## 2021-05-24 NOTE — ED ADULT NURSE NOTE - OBJECTIVE STATEMENT
Ventricular Rate : 80  Atrial Rate : 80  P-R Interval : 146  QRS Duration : 82  Q-T Interval : 354  QTC Calculation(Bazett) : 408  P Axis : 41  R Axis : 50  T Axis : 38  Diagnosis : Normal sinus rhythm  Normal ECG  When compared with ECG of 15-NOV-2019 11:10,  Non-specific change in ST segment in Anterior leads  Confirmed by NERY MONTOYA ([id number]) on 5/22/2020 2:03:00 PM   see trauma flow sheet for documentation

## 2021-05-24 NOTE — ED ADULT NURSE REASSESSMENT NOTE - NS ED NURSE REASSESS COMMENT FT1
Assumed care of pt from CHRIS Ackerman, c collar in place. Pt c/o intermitted pain and nausea. Pt medicated for nausea at 1730.
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
## 2021-05-24 NOTE — ED PROVIDER NOTE - MUSCULOSKELETAL, MLM
Spine appears normal, range of motion is not limited. Nasal deformity, swelling. TTP b/l knees. mild pain to palp midback, range of motion is not limited. Nasal deformity, swelling. nontender to palp b/l knees, pain to palp right ankle, skin intact lower ext and distal n/v/m intact

## 2021-05-24 NOTE — ED PROVIDER NOTE - ENMT, MLM
Airway patent, Nasal mucosa clear. Mouth with normal mucosa. Throat has no vesicles, no oropharyngeal exudates and uvula is midline. Airway patent, Mouth with normal mucosa. Throat has no vesicles, no oropharyngeal exudates and uvula is midline. Bruising and swelling noes and no septal hematoma

## 2021-05-24 NOTE — H&P ADULT - NSHPPHYSICALEXAM_GEN_ALL_CORE
Vital Signs Last 24 Hrs  T(C): 36.8 (24 May 2021 14:25), Max: 36.8 (24 May 2021 14:25)  T(F): 98.3 (24 May 2021 14:25), Max: 98.3 (24 May 2021 14:25)  HR: 74 (24 May 2021 16:00) (71 - 87)  BP: 132/84 (24 May 2021 16:00) (132/84 - 144/101)  BP(mean): 93 (24 May 2021 16:00) (93 - 93)  RR: 17 (24 May 2021 16:00) (15 - 18)  SpO2: 100% (24 May 2021 16:00) (96% - 100%)    PHYSICAL EXAM:  Constitutional: NAD, GCS: 15/15  AOX3  Eyes:  WNL  ENMT:  sasal swelling  Neck:  pain on movement, collar placed.   Back: Non tender  Respiratory: CTABL, anterior chest wall tenderness.   Cardiovascular:  S1+S2+0  Gastrointestinal: Soft, ND , NT  Genitourinary:  WNL  Extremities: NV intact  Vascular:  Intact  Neurological: No focal neurological deficit,  CN, motor and sensory system grossly intact.  Skin: WNL  Musculoskeletal: WNL  Psychiatric: Grossly WNL

## 2021-05-24 NOTE — H&P ADULT - NSHPLABSRESULTS_GEN_ALL_CORE
Labs:                          13.0   6.37  )-----------( 264      ( 24 May 2021 13:23 )             37.8       05-24    141  |  108  |  18  ----------------------------<  150<H>  3.7   |  25  |  1.01    Ca    9.5      24 May 2021 13:23    TPro  6.7  /  Alb  3.6  /  TBili  1.0  /  DBili  x   /  AST  19  /  ALT  23  /  AlkPhos  43  05-24      PT/INR - ( 24 May 2021 13:23 )   PT: 11.7 sec;   INR: 1.01 ratio         PTT - ( 24 May 2021 13:23 )  PTT:32.9 sec      < from: CT Abdomen and Pelvis w/ IV Cont (05.24.21 @ 13:57) >      IMPRESSION: Nondisplaced fracture of the sternum. No evidence of visceral organ injury. No pneumothorax or pneumomediastinum.      < from: CT Maxillofacial No Cont (05.24.21 @ 13:56) >      IMPRESSION:    Brain CT: No acute hemorrhage, extra-axial collections or displaced calvarial fracture.    Maxillofacial bone CT: No acute fracture.    Cervical spine CT: No acute fracture or traumatic subluxation. Multilevel degenerative changes.          < end of copied text >

## 2021-05-24 NOTE — ED PROVIDER NOTE - CARDIAC, MLM
Normal rate, regular rhythm.  Heart sounds S1, S2.  No murmurs, rubs or gallops. Normal rate, regular rhythm.  Heart sounds S1, S2.  No murmurs, rubs or gallops.  No carotid bruit

## 2021-05-24 NOTE — ED PROVIDER NOTE - ATTENDING CONTRIBUTION TO CARE
I, Walter Mckeon MD, personally saw the patient with the resident, and completed the key components of the history and physical exam. I then discussed the management plan with the resident.
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
## 2021-05-25 DIAGNOSIS — S22.22XD FRACTURE OF BODY OF STERNUM, SUBSEQUENT ENCOUNTER FOR FRACTURE WITH ROUTINE HEALING: ICD-10-CM
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
## 2021-05-25 LAB
COVID-19 SPIKE DOMAIN AB INTERP: NEGATIVE — SIGNIFICANT CHANGE UP
COVID-19 SPIKE DOMAIN ANTIBODY RESULT: 0.4 U/ML — SIGNIFICANT CHANGE UP
HCV AB S/CO SERPL IA: 0.05 S/CO — SIGNIFICANT CHANGE UP (ref 0–0.99)
HCV AB SERPL-IMP: SIGNIFICANT CHANGE UP
SARS-COV-2 IGG+IGM SERPL QL IA: 0.4 U/ML — SIGNIFICANT CHANGE UP
SARS-COV-2 IGG+IGM SERPL QL IA: NEGATIVE — SIGNIFICANT CHANGE UP

## 2021-05-25 PROCEDURE — 71045 X-RAY EXAM CHEST 1 VIEW: CPT | Mod: 26

## 2021-05-25 PROCEDURE — 99232 SBSQ HOSP IP/OBS MODERATE 35: CPT

## 2021-05-25 PROCEDURE — 93306 TTE W/DOPPLER COMPLETE: CPT | Mod: 26

## 2021-05-25 RX ORDER — MECLIZINE HCL 12.5 MG
12.5 TABLET ORAL DAILY
Refills: 0 | Status: DISCONTINUED | OUTPATIENT
Start: 2021-05-25 | End: 2021-05-28

## 2021-05-25 RX ADMIN — ATORVASTATIN CALCIUM 10 MILLIGRAM(S): 80 TABLET, FILM COATED ORAL at 21:33

## 2021-05-25 RX ADMIN — Medication 400 MILLIGRAM(S): at 07:10

## 2021-05-25 RX ADMIN — LIDOCAINE 1 PATCH: 4 CREAM TOPICAL at 03:43

## 2021-05-25 RX ADMIN — Medication 12.5 MILLIGRAM(S): at 15:35

## 2021-05-25 RX ADMIN — LIDOCAINE 1 PATCH: 4 CREAM TOPICAL at 21:33

## 2021-05-25 RX ADMIN — FAMOTIDINE 20 MILLIGRAM(S): 10 INJECTION INTRAVENOUS at 21:33

## 2021-05-25 RX ADMIN — LIDOCAINE 1 PATCH: 4 CREAM TOPICAL at 08:27

## 2021-05-25 RX ADMIN — Medication 1 TABLET(S): at 08:28

## 2021-05-25 RX ADMIN — Medication 400 MILLIGRAM(S): at 01:14

## 2021-05-25 RX ADMIN — TRAMADOL HYDROCHLORIDE 50 MILLIGRAM(S): 50 TABLET ORAL at 18:07

## 2021-05-25 RX ADMIN — Medication 400 MILLIGRAM(S): at 05:32

## 2021-05-25 RX ADMIN — Medication 400 MILLIGRAM(S): at 21:33

## 2021-05-25 RX ADMIN — TRAMADOL HYDROCHLORIDE 50 MILLIGRAM(S): 50 TABLET ORAL at 08:28

## 2021-05-25 RX ADMIN — Medication 50 MICROGRAM(S): at 05:32

## 2021-05-25 RX ADMIN — FAMOTIDINE 20 MILLIGRAM(S): 10 INJECTION INTRAVENOUS at 08:27

## 2021-05-25 RX ADMIN — SCOPALAMINE 1 PATCH: 1 PATCH, EXTENDED RELEASE TRANSDERMAL at 07:11

## 2021-05-25 RX ADMIN — Medication 400 MILLIGRAM(S): at 14:34
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
## 2021-05-26 DIAGNOSIS — I10 ESSENTIAL (PRIMARY) HYPERTENSION: ICD-10-CM

## 2021-05-26 DIAGNOSIS — R55 SYNCOPE AND COLLAPSE: ICD-10-CM

## 2021-05-26 PROCEDURE — 99231 SBSQ HOSP IP/OBS SF/LOW 25: CPT

## 2021-05-26 PROCEDURE — 95816 EEG AWAKE AND DROWSY: CPT | Mod: 26

## 2021-05-26 PROCEDURE — 99223 1ST HOSP IP/OBS HIGH 75: CPT

## 2021-05-26 RX ADMIN — Medication 1 TABLET(S): at 09:48

## 2021-05-26 RX ADMIN — Medication 400 MILLIGRAM(S): at 05:25

## 2021-05-26 RX ADMIN — FAMOTIDINE 20 MILLIGRAM(S): 10 INJECTION INTRAVENOUS at 21:35

## 2021-05-26 RX ADMIN — LIDOCAINE 1 PATCH: 4 CREAM TOPICAL at 21:37

## 2021-05-26 RX ADMIN — SCOPALAMINE 1 PATCH: 1 PATCH, EXTENDED RELEASE TRANSDERMAL at 21:37

## 2021-05-26 RX ADMIN — Medication 400 MILLIGRAM(S): at 21:36

## 2021-05-26 RX ADMIN — Medication 400 MILLIGRAM(S): at 13:20

## 2021-05-26 RX ADMIN — Medication 12.5 MILLIGRAM(S): at 09:48

## 2021-05-26 RX ADMIN — Medication 50 MICROGRAM(S): at 05:25

## 2021-05-26 RX ADMIN — LIDOCAINE 1 PATCH: 4 CREAM TOPICAL at 09:48

## 2021-05-26 RX ADMIN — ATORVASTATIN CALCIUM 10 MILLIGRAM(S): 80 TABLET, FILM COATED ORAL at 21:35

## 2021-05-26 RX ADMIN — TRAMADOL HYDROCHLORIDE 50 MILLIGRAM(S): 50 TABLET ORAL at 09:48

## 2021-05-26 RX ADMIN — LIDOCAINE 1 PATCH: 4 CREAM TOPICAL at 19:43

## 2021-05-26 RX ADMIN — FAMOTIDINE 20 MILLIGRAM(S): 10 INJECTION INTRAVENOUS at 09:48

## 2021-05-26 RX ADMIN — Medication 400 MILLIGRAM(S): at 06:10
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
## 2021-05-27 LAB — SARS-COV-2 RNA SPEC QL NAA+PROBE: SIGNIFICANT CHANGE UP

## 2021-05-27 PROCEDURE — 70551 MRI BRAIN STEM W/O DYE: CPT | Mod: 26

## 2021-05-27 PROCEDURE — 99223 1ST HOSP IP/OBS HIGH 75: CPT

## 2021-05-27 PROCEDURE — 99233 SBSQ HOSP IP/OBS HIGH 50: CPT

## 2021-05-27 PROCEDURE — 99231 SBSQ HOSP IP/OBS SF/LOW 25: CPT

## 2021-05-27 PROCEDURE — 99232 SBSQ HOSP IP/OBS MODERATE 35: CPT

## 2021-05-27 RX ADMIN — FAMOTIDINE 20 MILLIGRAM(S): 10 INJECTION INTRAVENOUS at 09:31

## 2021-05-27 RX ADMIN — Medication 50 MICROGRAM(S): at 05:28

## 2021-05-27 RX ADMIN — SCOPALAMINE 1 PATCH: 1 PATCH, EXTENDED RELEASE TRANSDERMAL at 19:34

## 2021-05-27 RX ADMIN — Medication 650 MILLIGRAM(S): at 21:16

## 2021-05-27 RX ADMIN — LIDOCAINE 1 PATCH: 4 CREAM TOPICAL at 09:31

## 2021-05-27 RX ADMIN — SCOPALAMINE 1 PATCH: 1 PATCH, EXTENDED RELEASE TRANSDERMAL at 21:17

## 2021-05-27 RX ADMIN — SCOPALAMINE 1 PATCH: 1 PATCH, EXTENDED RELEASE TRANSDERMAL at 21:13

## 2021-05-27 RX ADMIN — LIDOCAINE 1 PATCH: 4 CREAM TOPICAL at 21:14

## 2021-05-27 RX ADMIN — Medication 400 MILLIGRAM(S): at 05:50

## 2021-05-27 RX ADMIN — FAMOTIDINE 20 MILLIGRAM(S): 10 INJECTION INTRAVENOUS at 21:17

## 2021-05-27 RX ADMIN — ATORVASTATIN CALCIUM 10 MILLIGRAM(S): 80 TABLET, FILM COATED ORAL at 21:17

## 2021-05-27 RX ADMIN — Medication 400 MILLIGRAM(S): at 05:28

## 2021-05-27 RX ADMIN — LIDOCAINE 1 PATCH: 4 CREAM TOPICAL at 19:33

## 2021-05-27 RX ADMIN — Medication 12.5 MILLIGRAM(S): at 09:31

## 2021-05-27 RX ADMIN — Medication 400 MILLIGRAM(S): at 00:04

## 2021-05-27 RX ADMIN — Medication 400 MILLIGRAM(S): at 13:33

## 2021-05-27 RX ADMIN — Medication 1 MILLIGRAM(S): at 13:36

## 2021-05-27 RX ADMIN — Medication 1 TABLET(S): at 09:31

## 2021-05-27 RX ADMIN — Medication 650 MILLIGRAM(S): at 23:58
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
## 2021-05-28 ENCOUNTER — TRANSCRIPTION ENCOUNTER (OUTPATIENT)
Age: 76
End: 2021-05-28

## 2021-05-28 LAB
ANION GAP SERPL CALC-SCNC: 7 MMOL/L — SIGNIFICANT CHANGE UP (ref 5–17)
BUN SERPL-MCNC: 28 MG/DL — HIGH (ref 7–23)
CALCIUM SERPL-MCNC: 8.8 MG/DL — SIGNIFICANT CHANGE UP (ref 8.5–10.1)
CHLORIDE SERPL-SCNC: 114 MMOL/L — HIGH (ref 96–108)
CO2 SERPL-SCNC: 24 MMOL/L — SIGNIFICANT CHANGE UP (ref 22–31)
CREAT SERPL-MCNC: 0.64 MG/DL — SIGNIFICANT CHANGE UP (ref 0.5–1.3)
GLUCOSE SERPL-MCNC: 92 MG/DL — SIGNIFICANT CHANGE UP (ref 70–99)
HCT VFR BLD CALC: 35.7 % — SIGNIFICANT CHANGE UP (ref 34.5–45)
HGB BLD-MCNC: 12.1 G/DL — SIGNIFICANT CHANGE UP (ref 11.5–15.5)
MCHC RBC-ENTMCNC: 31.8 PG — SIGNIFICANT CHANGE UP (ref 27–34)
MCHC RBC-ENTMCNC: 33.9 GM/DL — SIGNIFICANT CHANGE UP (ref 32–36)
MCV RBC AUTO: 93.9 FL — SIGNIFICANT CHANGE UP (ref 80–100)
PLATELET # BLD AUTO: 202 K/UL — SIGNIFICANT CHANGE UP (ref 150–400)
POTASSIUM SERPL-MCNC: 4 MMOL/L — SIGNIFICANT CHANGE UP (ref 3.5–5.3)
POTASSIUM SERPL-SCNC: 4 MMOL/L — SIGNIFICANT CHANGE UP (ref 3.5–5.3)
RBC # BLD: 3.8 M/UL — SIGNIFICANT CHANGE UP (ref 3.8–5.2)
RBC # FLD: 13.2 % — SIGNIFICANT CHANGE UP (ref 10.3–14.5)
SODIUM SERPL-SCNC: 145 MMOL/L — SIGNIFICANT CHANGE UP (ref 135–145)
TSH SERPL-MCNC: 0.4 UU/ML — SIGNIFICANT CHANGE UP (ref 0.34–4.82)
VIT B12 SERPL-MCNC: 626 PG/ML — SIGNIFICANT CHANGE UP (ref 232–1245)
VIT D25+D1,25 OH+D1,25 PNL SERPL-MCNC: 77.1 PG/ML — SIGNIFICANT CHANGE UP (ref 19.9–79.3)
WBC # BLD: 5.17 K/UL — SIGNIFICANT CHANGE UP (ref 3.8–10.5)
WBC # FLD AUTO: 5.17 K/UL — SIGNIFICANT CHANGE UP (ref 3.8–10.5)

## 2021-05-28 PROCEDURE — 99232 SBSQ HOSP IP/OBS MODERATE 35: CPT

## 2021-05-28 PROCEDURE — 99233 SBSQ HOSP IP/OBS HIGH 50: CPT

## 2021-05-28 RX ORDER — MECLIZINE HCL 12.5 MG
1 TABLET ORAL
Qty: 0 | Refills: 0 | DISCHARGE
Start: 2021-05-28

## 2021-05-28 RX ORDER — MECLIZINE HCL 12.5 MG
12.5 TABLET ORAL DAILY
Refills: 0 | Status: DISCONTINUED | OUTPATIENT
Start: 2021-05-28 | End: 2021-05-30

## 2021-05-28 RX ORDER — ACETAMINOPHEN 500 MG
2 TABLET ORAL
Qty: 0 | Refills: 0 | DISCHARGE
Start: 2021-05-28

## 2021-05-28 RX ORDER — TRAMADOL HYDROCHLORIDE 50 MG/1
1 TABLET ORAL
Qty: 0 | Refills: 0 | DISCHARGE
Start: 2021-05-28

## 2021-05-28 RX ORDER — ISOPROTERENOL HYDROCHLORIDE 1 MG/5ML
1 INJECTION, SOLUTION INTRACARDIAC; INTRAMUSCULAR; INTRAVENOUS; SUBCUTANEOUS
Qty: 1 | Refills: 0 | Status: DISCONTINUED | OUTPATIENT
Start: 2021-05-28 | End: 2021-05-30

## 2021-05-28 RX ORDER — FAMOTIDINE 10 MG/ML
1 INJECTION INTRAVENOUS
Qty: 0 | Refills: 0 | DISCHARGE
Start: 2021-05-28

## 2021-05-28 RX ADMIN — TRAMADOL HYDROCHLORIDE 50 MILLIGRAM(S): 50 TABLET ORAL at 21:45

## 2021-05-28 RX ADMIN — FAMOTIDINE 20 MILLIGRAM(S): 10 INJECTION INTRAVENOUS at 08:10

## 2021-05-28 RX ADMIN — SCOPALAMINE 1 PATCH: 1 PATCH, EXTENDED RELEASE TRANSDERMAL at 19:34

## 2021-05-28 RX ADMIN — SCOPALAMINE 1 PATCH: 1 PATCH, EXTENDED RELEASE TRANSDERMAL at 05:27

## 2021-05-28 RX ADMIN — Medication 1 TABLET(S): at 08:10

## 2021-05-28 RX ADMIN — ATORVASTATIN CALCIUM 10 MILLIGRAM(S): 80 TABLET, FILM COATED ORAL at 21:40

## 2021-05-28 RX ADMIN — LIDOCAINE 1 PATCH: 4 CREAM TOPICAL at 20:30

## 2021-05-28 RX ADMIN — FAMOTIDINE 20 MILLIGRAM(S): 10 INJECTION INTRAVENOUS at 21:40

## 2021-05-28 RX ADMIN — LIDOCAINE 1 PATCH: 4 CREAM TOPICAL at 19:33

## 2021-05-28 RX ADMIN — TRAMADOL HYDROCHLORIDE 50 MILLIGRAM(S): 50 TABLET ORAL at 08:10

## 2021-05-28 RX ADMIN — Medication 12.5 MILLIGRAM(S): at 08:23

## 2021-05-28 RX ADMIN — TRAMADOL HYDROCHLORIDE 50 MILLIGRAM(S): 50 TABLET ORAL at 22:30

## 2021-05-28 RX ADMIN — LIDOCAINE 1 PATCH: 4 CREAM TOPICAL at 08:11
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
## 2021-05-28 NOTE — DISCHARGE NOTE PROVIDER - NSDCMRMEDTOKEN_GEN_ALL_CORE_FT
acetaminophen 325 mg oral tablet: 2 tab(s) orally every 6 hours, As needed, Mild Pain (1 - 3)  acetaminophen 325 mg oral tablet: 2 tab(s) orally every 4 hours, As needed, Temp greater or equal to 38C (100.4F), Mild Pain (1 - 3)  atorvastatin 10 mg oral tablet: 1 tab(s) orally once a day  Covid-19 Vaccine: pt had one dose so far  famotidine 20 mg oral tablet: 1 tab(s) orally every 12 hours  levothyroxine 50 mcg (0.05 mg) oral tablet: 1 tab(s) orally once a day  meclizine 12.5 mg oral tablet: 1 tab(s) orally once a day  Multiple Vitamins oral tablet: 1 tab(s) orally once a day  traMADol 50 mg oral tablet: 1 tab(s) orally every 6 hours, As needed, Moderate Pain (4 - 6)   acetaminophen 325 mg oral tablet: 2 tab(s) orally every 6 hours, As needed, Mild Pain (1 - 3)  acetaminophen 325 mg oral tablet: 2 tab(s) orally every 4 hours, As needed, Temp greater or equal to 38C (100.4F), Mild Pain (1 - 3)  atorvastatin 10 mg oral tablet: 1 tab(s) orally once a day  famotidine 20 mg oral tablet: 1 tab(s) orally every 12 hours  levothyroxine 50 mcg (0.05 mg) oral tablet: 1 tab(s) orally once a day  meclizine 12.5 mg oral tablet: 1 tab(s) orally once a day  Multiple Vitamins oral tablet: 1 tab(s) orally once a day  scopolamine: Apply topically to affected area every 72 hours  traMADol 50 mg oral tablet: 1 tab(s) orally every 6 hours, As needed, Moderate Pain (4 - 6)

## 2021-05-28 NOTE — PROCEDURE NOTE - NSICDXPROCEDURE_GEN_ALL_CORE_FT
PROCEDURES:  Full cardiac electrophysiology study 28-May-2021 14:39:45  Luis Angel Colvin  Insertion, loop recorder 28-May-2021 14:39:58  Luis Angel Colivn

## 2021-05-28 NOTE — DISCHARGE NOTE PROVIDER - NSDCACTIVITY_GEN_ALL_CORE
Do not drive or operate machinery/Showering allowed/Stairs allowed/Walking - Indoors allowed/No heavy lifting/straining/Walking - Outdoors allowed Do not drive or operate machinery/Showering allowed/Do not make important decisions/Stairs allowed/Walking - Indoors allowed/No heavy lifting/straining/Walking - Outdoors allowed
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
## 2021-05-28 NOTE — DISCHARGE NOTE PROVIDER - PROVIDER TOKENS
PROVIDER:[TOKEN:[[id number]],FOLLOWUP:[2 weeks]],FREE:[LAST:[PMD, primary neurologist],PHONE:[(   )    -],FAX:[(   )    -]],PROVIDER:[TOKEN:[[id number]],FOLLOWUP:[1 month]] PROVIDER:[TOKEN:[[id number]],FOLLOWUP:[1 week]],PROVIDER:[TOKEN:[[id number]],FOLLOWUP:[1 month]],FREE:[LAST:[PMD, primary neurologist],PHONE:[(   )    -],FAX:[(   )    -]]

## 2021-05-28 NOTE — DIETITIAN NUTRITION RISK NOTIFICATION - TREATMENT: THE FOLLOWING DIET HAS BEEN RECOMMENDED
Diet, NPO after Midnight:      NPO Start Date: 27-May-2021,   NPO Start Time: 23:59 (05-27-21 @ 16:48) [Active]  Diet, Regular (05-24-21 @ 16:54) [Active]

## 2021-05-28 NOTE — DIETITIAN INITIAL EVALUATION ADULT. - NAME AND PHONE
Marnie Chavez RDN, CDN, [facility]      [phone]   [email or web address]

## 2021-05-28 NOTE — PROCEDURE NOTE - GENERAL PROCEDURE DETAILS
pt had complete electrophysiology study with programmed stimulation fro A and V and was non inducible for any arrhtyhmia.ILR was implanted in the left parasternal region for syncope. Normal P/R sensing. hemostasis achieved with manual pressure in the groin and ILR incision was closed with Polysorb suture.

## 2021-05-28 NOTE — DISCHARGE NOTE PROVIDER - DETAILS OF MALNUTRITION DIAGNOSIS/DIAGNOSES
This patient has been assessed with a concern for Malnutrition and was treated during this hospitalization for the following Nutrition diagnosis/diagnoses:     -  05/28/2021: Severe protein-calorie malnutrition

## 2021-05-28 NOTE — DIETITIAN NUTRITION RISK NOTIFICATION - ADDITIONAL COMMENTS/DIETITIAN RECOMMENDATIONS
Advance to regular when medically feasible  add ensure enlive 8 oz tid  mvi w minerals  Record PO intake in EMR after each meal (nursing.)   Monitor PO intake, tolerance, labs and weight.
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
## 2021-05-28 NOTE — PACU DISCHARGE NOTE - COMMENTS
Status post electrophysiology study, LINQ implant. Tolerated well.  Report given to Corrine FERGUSON.  Transported  with remote monitor to [phone] via stertcher/telemetry confirmed.
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
## 2021-05-28 NOTE — DISCHARGE NOTE PROVIDER - NSDCFUADDINST_GEN_ALL_CORE_FT
Seek medical attention of develops worsening headache, nausea, vomiting, seizure, any focal neurological complaint, Pain not controlled with medication, difficulty breathing or fever. No Heavy lifting, pushing, pulling or excessive physical activity for 4 weeks. Incentive spirometry. Follow up with cardiology, primary neurologist. Fall precautions. call offices for follow up appointments.

## 2021-05-28 NOTE — DISCHARGE NOTE PROVIDER - CARE PROVIDER_API CALL
Luis Angel Colvin)  Cardiac Electrophysiology; Cardiovascular Disease; Internal Medicine  [address] [place] [address]  Phone: [phone]  Fax: [phone]  Follow Up Time: 2 weeks    PMD, primary neurologist,   Phone: (   )    -  Fax: (   )    -  Follow Up Time:     Danielle Simons)  Surgery; Surgical Critical Care  [address], [address]  [place] [address]  Phone: [phone]  Fax: [phone]  Follow Up Time: 1 month   Luis Angel Colvin)  Cardiac Electrophysiology; Cardiovascular Disease; Internal Medicine  [address] [place] [address]  Phone: [phone]  Fax: [phone]  Follow Up Time: 1 week    Danielle Simons)  Surgery; Surgical Critical Care  [address], [address]  [place] [address]  Phone: [phone]  Fax: [phone]  Follow Up Time: 1 month    PMD, primary neurologist,   Phone: (   )    -  Fax: (   )    -  Follow Up Time:
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
## 2021-05-28 NOTE — DISCHARGE NOTE PROVIDER - NSDCFUSCHEDAPPT_GEN_ALL_CORE_FT
CLINT SANDERS ; 05/31/2021 ; NPP Rad  [place]  CLINT SANDERS ; 06/25/2021 ; NPP CardioElectro [address] CLINT SANDERS ; 05/31/2021 ; NPP Rad  [place]  CLINT SANDERS ; 06/11/2021 ; NPP CardioElectro [address]

## 2021-05-28 NOTE — DISCHARGE NOTE PROVIDER - CARE PROVIDERS DIRECT ADDRESSES
[email or web address],DirectAddress_Unknown

## 2021-05-28 NOTE — ASU PREOP CHECKLIST - SITE MARKED BY ANESTHESIOLOGIST
From: Aleshia Chen  To: Dipti Doshi  Sent: 10/21/2020 8:23 PM CDT  Subject: Non-Urgent Medical Question    Hello, I was supposed to have an appointment yesterday but it got canceled so I talked to a triage nurse who had me get blood drawn to test for hormones in my system. There were none present but it still does not help me woth why I have had these miscarriages. I guess I am trying to figure out if I still need an appointment or if it won't help. The nurse said I should just keep tracking my periods as I had two within in two weeks of each other but is there anything else I need to do or can to figure out why this is happening? Thank you, Autumn   n/a
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
## 2021-05-28 NOTE — DIETITIAN INITIAL EVALUATION ADULT. - PERTINENT LABORATORY DATA
05-28 Na145 mmol/L Glu 92 mg/dL K+ 4.0 mmol/L Cr  0.64 mg/dL BUN 28 mg/dL<H> Phos n/a   Alb n/a   PAB n/a       BMI: BMI (kg/m2): 19.2 (05-24-21 @ 13:21)  HbA1c:   Glucose: POCT Blood Glucose.: 163 mg/dL (05-24-21 @ 13:31)    BP: 128/84 (05-28-21 @ 08:36) (99/69 - 128/84)  Lipid Panel:

## 2021-05-28 NOTE — PROGRESS NOTE ADULT - ATTENDING COMMENTS
pt is s/p syncope, eps non inducible for VT/SVT, s/p ILR insertion. No further EP work up at this time, follow with neurology.

## 2021-05-28 NOTE — PROGRESS NOTE ADULT - SUBJECTIVE AND OBJECTIVE BOX
Pt is resting in the bed, NAD. denies chest pain/SOB/palpitations.    s/p comprehensive EPS: negative, s/p ILR implant    ROS: All other ROS is negative unless indicated above.    Physical Exam:  Vital Signs Last 24 Hrs  T(C): 36.1 (28 May 2021 14:00), Max: 36.8 (28 May 2021 11:13)  T(F): 97 (28 May 2021 14:00), Max: 98.3 (28 May 2021 11:13)  HR: 59 (28 May 2021 15:50) (58 - 96)  BP: 102/66 (28 May 2021 15:50) (101/58 - 136/85)  RR: 16 (28 May 2021 15:50) (16 - 20)  SpO2: 96% (28 May 2021 15:50) (95% - 100%)               Constitutional: well developed, well nourished, no deformities and no acute distress    Neurological: Alert & Oriented x 3, MCKENNA, no focal deficits    HEENT: NC/AT, PERRLA, EOMI,  Neck supple.    Respiratory: CTA B/L, No wheezing/crackles/rhonchi    Cardiovascular: (+) S1 & S2, RRR, No m/r/g    Gastrointestinal: soft, NT, nondistended, (+) BS    Genitourinary: non distended bladder, voiding freely    Extremities: Rt groin: soft, NT, no hematoma    Skin:  mid sternal ILR site : C/D/I (+)dermabond    Allergies    codeine (Unknown)  morphine (Unknown)    MEDICATIONS  (STANDING):  atorvastatin 10 milliGRAM(s) Oral at bedtime  famotidine    Tablet 20 milliGRAM(s) Oral every 12 hours  isoproterenol Infusion 1 MICROgram(s)/Min (15 mL/Hr) IV Continuous <Continuous>  levothyroxine 50 MICROGram(s) Oral daily  lidocaine   Patch 1 Patch Transdermal every 24 hours  meclizine 12.5 milliGRAM(s) Oral daily  multivitamin 1 Tablet(s) Oral daily  scopolamine 1 mG/72 Hr(s) Patch 1 Patch Transdermal every 72 hours    MEDICATIONS  (PRN):  acetaminophen   Tablet .. 650 milliGRAM(s) Oral every 4 hours PRN Temp greater or equal to 38C (100.4F), Mild Pain (1 - 3)  acetaminophen   Tablet .. 650 milliGRAM(s) Oral every 6 hours PRN Mild Pain (1 - 3)  HYDROmorphone  Injectable 0.5 milliGRAM(s) IV Push every 4 hours PRN Severe Pain (7 - 10)  ondansetron Injectable 4 milliGRAM(s) IV Push every 6 hours PRN Nausea  traMADol 50 milliGRAM(s) Oral every 6 hours PRN Moderate Pain (4 - 6)    LABS:                        12.1   5.17  )-----------( 202      ( 28 May 2021 07:14 )             35.7     05-28    145  |  114<H>  |  28<H>  ----------------------------<  92  4.0   |  24  |  0.64    Ca    8.8      28 May 2021 07:14
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
## 2021-05-28 NOTE — DIETITIAN INITIAL EVALUATION ADULT. - PERTINENT MEDS FT
MEDICATIONS  (STANDING):  atorvastatin 10 milliGRAM(s) Oral at bedtime  famotidine    Tablet 20 milliGRAM(s) Oral every 12 hours  isoproterenol Infusion 1 MICROgram(s)/Min (15 mL/Hr) IV Continuous <Continuous>  levothyroxine 50 MICROGram(s) Oral daily  lidocaine   Patch 1 Patch Transdermal every 24 hours  meclizine 12.5 milliGRAM(s) Oral daily  multivitamin 1 Tablet(s) Oral daily  scopolamine 1 mG/72 Hr(s) Patch 1 Patch Transdermal every 72 hours    MEDICATIONS  (PRN):  acetaminophen   Tablet .. 650 milliGRAM(s) Oral every 4 hours PRN Temp greater or equal to 38C (100.4F), Mild Pain (1 - 3)  acetaminophen   Tablet .. 650 milliGRAM(s) Oral every 6 hours PRN Mild Pain (1 - 3)  HYDROmorphone  Injectable 0.5 milliGRAM(s) IV Push every 4 hours PRN Severe Pain (7 - 10)  ondansetron Injectable 4 milliGRAM(s) IV Push every 6 hours PRN Nausea  traMADol 50 milliGRAM(s) Oral every 6 hours PRN Moderate Pain (4 - 6)
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
## 2021-05-29 PROCEDURE — 99231 SBSQ HOSP IP/OBS SF/LOW 25: CPT

## 2021-05-29 PROCEDURE — 99233 SBSQ HOSP IP/OBS HIGH 50: CPT

## 2021-05-29 PROCEDURE — 99232 SBSQ HOSP IP/OBS MODERATE 35: CPT

## 2021-05-29 RX ORDER — TRAMADOL HYDROCHLORIDE 50 MG/1
1 TABLET ORAL
Qty: 20 | Refills: 0
Start: 2021-05-29 | End: 2021-06-02

## 2021-05-29 RX ORDER — SCOPALAMINE 1 MG/3D
1 PATCH, EXTENDED RELEASE TRANSDERMAL
Qty: 5 | Refills: 0
Start: 2021-05-29 | End: 2021-06-12

## 2021-05-29 RX ORDER — SCOPALAMINE 1 MG/3D
1 PATCH, EXTENDED RELEASE TRANSDERMAL
Qty: 0 | Refills: 0 | DISCHARGE
Start: 2021-05-29

## 2021-05-29 RX ORDER — MECLIZINE HCL 12.5 MG
1 TABLET ORAL
Qty: 15 | Refills: 0
Start: 2021-05-29 | End: 2021-06-12

## 2021-05-29 RX ADMIN — Medication 650 MILLIGRAM(S): at 17:05

## 2021-05-29 RX ADMIN — TRAMADOL HYDROCHLORIDE 50 MILLIGRAM(S): 50 TABLET ORAL at 21:24

## 2021-05-29 RX ADMIN — Medication 650 MILLIGRAM(S): at 16:34

## 2021-05-29 RX ADMIN — FAMOTIDINE 20 MILLIGRAM(S): 10 INJECTION INTRAVENOUS at 08:23

## 2021-05-29 RX ADMIN — LIDOCAINE 1 PATCH: 4 CREAM TOPICAL at 08:25

## 2021-05-29 RX ADMIN — LIDOCAINE 1 PATCH: 4 CREAM TOPICAL at 19:23

## 2021-05-29 RX ADMIN — TRAMADOL HYDROCHLORIDE 50 MILLIGRAM(S): 50 TABLET ORAL at 08:23

## 2021-05-29 RX ADMIN — Medication 50 MICROGRAM(S): at 05:28

## 2021-05-29 RX ADMIN — SCOPALAMINE 1 PATCH: 1 PATCH, EXTENDED RELEASE TRANSDERMAL at 05:26

## 2021-05-29 RX ADMIN — Medication 12.5 MILLIGRAM(S): at 08:26

## 2021-05-29 RX ADMIN — Medication 1 TABLET(S): at 08:23

## 2021-05-29 RX ADMIN — ATORVASTATIN CALCIUM 10 MILLIGRAM(S): 80 TABLET, FILM COATED ORAL at 21:23

## 2021-05-29 RX ADMIN — FAMOTIDINE 20 MILLIGRAM(S): 10 INJECTION INTRAVENOUS at 21:23
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
## 2021-05-29 NOTE — PROGRESS NOTE ADULT - PROBLEM SELECTOR PLAN 2
Blood pressure is controlled. No additional therapy indicated. Continue to monitor.
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
## 2021-05-29 NOTE — PROGRESS NOTE ADULT - PROBLEM SELECTOR PROBLEM 1
Closed fracture of body of sternum with routine healing, subsequent encounter
Syncope and collapse
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
## 2021-05-30 ENCOUNTER — TRANSCRIPTION ENCOUNTER (OUTPATIENT)
Age: 76
End: 2021-05-30

## 2021-05-30 VITALS
OXYGEN SATURATION: 97 % | TEMPERATURE: 98 F | RESPIRATION RATE: 16 BRPM | SYSTOLIC BLOOD PRESSURE: 122 MMHG | HEART RATE: 76 BPM | DIASTOLIC BLOOD PRESSURE: 82 MMHG

## 2021-05-30 PROCEDURE — 99238 HOSP IP/OBS DSCHRG MGMT 30/<: CPT

## 2021-05-30 RX ADMIN — FAMOTIDINE 20 MILLIGRAM(S): 10 INJECTION INTRAVENOUS at 09:15

## 2021-05-30 RX ADMIN — Medication 650 MILLIGRAM(S): at 09:14

## 2021-05-30 RX ADMIN — Medication 1 TABLET(S): at 09:15

## 2021-05-30 RX ADMIN — LIDOCAINE 1 PATCH: 4 CREAM TOPICAL at 09:15

## 2021-05-30 RX ADMIN — Medication 50 MICROGRAM(S): at 05:45
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
## 2021-05-30 NOTE — PROGRESS NOTE ADULT - PROVIDER SPECIALTY LIST ADULT
Hospitalist
Hospitalist
Trauma Surgery
Electrophysiology
Hospitalist
Trauma Surgery
Cardiology
Neurology
Cardiology
Cardiology
Neurology
Trauma Surgery

## 2021-05-30 NOTE — DISCHARGE NOTE NURSING/CASE MANAGEMENT/SOCIAL WORK - 
ADDITIONAL INFORMATION
patient doesn't remember which vaccine she received or when but she knows she is due for the second shot on June 9th.

## 2021-05-30 NOTE — DISCHARGE NOTE NURSING/CASE MANAGEMENT/SOCIAL WORK - PATIENT PORTAL LINK FT
You can access the FollowMyHealth Patient Portal offered by [facility] by registering at the following website: [email or web address]. By joining [facility]’s FollowMyHealth portal, you will also be able to view your health information using other applications (apps) compatible with our system.
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
## 2021-05-30 NOTE — PROGRESS NOTE ADULT - NUTRITIONAL ASSESSMENT
This patient has been assessed with a concern for Malnutrition and has been determined to have a diagnosis/diagnoses of Severe protein-calorie malnutrition.    This patient is being managed with:   Diet Regular-  Entered: May 24 2021  4:51PM    
This patient has been assessed with a concern for Malnutrition and has been determined to have a diagnosis/diagnoses of Severe protein-calorie malnutrition.    This patient is being managed with:   Diet NPO after Midnight-     NPO Start Date: 27-May-2021   NPO Start Time: 23:59  Entered: May 27 2021  4:48PM    Diet Regular-  Entered: May 24 2021  4:51PM    
This patient has been assessed with a concern for Malnutrition and has been determined to have a diagnosis/diagnoses of Severe protein-calorie malnutrition.    This patient is being managed with:   Diet NPO after Midnight-     NPO Start Date: 27-May-2021   NPO Start Time: 23:59  Entered: May 27 2021  4:48PM    Diet Regular-  Entered: May 24 2021  4:51PM    
This patient has been assessed with a concern for Malnutrition and has been determined to have a diagnosis/diagnoses of Severe protein-calorie malnutrition.    This patient is being managed with:   Diet Regular-  Entered: May 24 2021  4:51PM    
This patient has been assessed with a concern for Malnutrition and has been determined to have a diagnosis/diagnoses of Severe protein-calorie malnutrition.    This patient is being managed with:   Diet NPO after Midnight-     NPO Start Date: 27-May-2021   NPO Start Time: 23:59  Entered: May 27 2021  4:48PM    Diet Regular-  Entered: May 24 2021  4:51PM

## 2021-05-30 NOTE — PROGRESS NOTE ADULT - NSICDXPILOT_GEN_ALL_CORE
[place]

## 2021-05-31 ENCOUNTER — APPOINTMENT (OUTPATIENT)
Dept: MRI IMAGING | Facility: CLINIC | Age: 76
End: 2021-05-31

## 2021-06-08 DIAGNOSIS — R55 SYNCOPE AND COLLAPSE: ICD-10-CM

## 2021-06-08 DIAGNOSIS — I36.1 NONRHEUMATIC TRICUSPID (VALVE) INSUFFICIENCY: ICD-10-CM

## 2021-06-08 DIAGNOSIS — M25.78 OSTEOPHYTE, VERTEBRAE: ICD-10-CM

## 2021-06-08 DIAGNOSIS — E78.5 HYPERLIPIDEMIA, UNSPECIFIED: ICD-10-CM
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
## 2021-06-08 DIAGNOSIS — I48.92 UNSPECIFIED ATRIAL FLUTTER: ICD-10-CM

## 2021-06-08 DIAGNOSIS — Z88.5 ALLERGY STATUS TO NARCOTIC AGENT: ICD-10-CM

## 2021-06-08 DIAGNOSIS — S05.12XA CONTUSION OF EYEBALL AND ORBITAL TISSUES, LEFT EYE, INITIAL ENCOUNTER: ICD-10-CM

## 2021-06-08 DIAGNOSIS — S22.22XA FRACTURE OF BODY OF STERNUM, INITIAL ENCOUNTER FOR CLOSED FRACTURE: ICD-10-CM
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
## 2021-06-08 DIAGNOSIS — E03.9 HYPOTHYROIDISM, UNSPECIFIED: ICD-10-CM

## 2021-06-08 DIAGNOSIS — R42 DIZZINESS AND GIDDINESS: ICD-10-CM

## 2021-06-08 DIAGNOSIS — E43 UNSPECIFIED SEVERE PROTEIN-CALORIE MALNUTRITION: ICD-10-CM

## 2021-06-08 DIAGNOSIS — I10 ESSENTIAL (PRIMARY) HYPERTENSION: ICD-10-CM

## 2021-06-08 DIAGNOSIS — M43.12 SPONDYLOLISTHESIS, CERVICAL REGION: ICD-10-CM
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
## 2021-06-08 DIAGNOSIS — N28.1 CYST OF KIDNEY, ACQUIRED: ICD-10-CM

## 2021-06-11 ENCOUNTER — APPOINTMENT (OUTPATIENT)
Dept: ELECTROPHYSIOLOGY | Facility: CLINIC | Age: 76
End: 2021-06-11
Payer: MEDICARE

## 2021-06-11 VITALS
BODY MASS INDEX: 16.99 KG/M2 | DIASTOLIC BLOOD PRESSURE: 77 MMHG | SYSTOLIC BLOOD PRESSURE: 116 MMHG | RESPIRATION RATE: 14 BRPM | WEIGHT: 90 LBS | HEIGHT: 61 IN | HEART RATE: 96 BPM | OXYGEN SATURATION: 97 %

## 2021-06-11 PROCEDURE — 93285 PRGRMG DEV EVAL SCRMS IP: CPT
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
## 2021-06-24 ENCOUNTER — TRANSCRIPTION ENCOUNTER (OUTPATIENT)
Age: 76
End: 2021-06-24

## 2021-06-25 ENCOUNTER — APPOINTMENT (OUTPATIENT)
Dept: ELECTROPHYSIOLOGY | Facility: CLINIC | Age: 76
End: 2021-06-25

## 2021-07-02 ENCOUNTER — APPOINTMENT (OUTPATIENT)
Dept: ELECTROPHYSIOLOGY | Facility: CLINIC | Age: 76
End: 2021-07-02
Payer: MEDICARE

## 2021-07-02 ENCOUNTER — NON-APPOINTMENT (OUTPATIENT)
Age: 76
End: 2021-07-02

## 2021-07-02 PROCEDURE — G2066: CPT

## 2021-07-02 PROCEDURE — 93298 REM INTERROG DEV EVAL SCRMS: CPT

## 2021-08-06 ENCOUNTER — NON-APPOINTMENT (OUTPATIENT)
Age: 76
End: 2021-08-06

## 2021-08-06 ENCOUNTER — APPOINTMENT (OUTPATIENT)
Dept: ELECTROPHYSIOLOGY | Facility: CLINIC | Age: 76
End: 2021-08-06
Payer: MEDICARE

## 2021-08-06 PROCEDURE — G2066: CPT

## 2021-08-06 PROCEDURE — 93298 REM INTERROG DEV EVAL SCRMS: CPT

## 2021-09-10 ENCOUNTER — APPOINTMENT (OUTPATIENT)
Dept: ELECTROPHYSIOLOGY | Facility: CLINIC | Age: 76
End: 2021-09-10
Payer: MEDICARE

## 2021-09-10 ENCOUNTER — NON-APPOINTMENT (OUTPATIENT)
Age: 76
End: 2021-09-10

## 2021-09-10 PROCEDURE — G2066: CPT

## 2021-09-10 PROCEDURE — 93298 REM INTERROG DEV EVAL SCRMS: CPT

## 2021-10-15 ENCOUNTER — APPOINTMENT (OUTPATIENT)
Dept: ELECTROPHYSIOLOGY | Facility: CLINIC | Age: 76
End: 2021-10-15
Payer: MEDICARE

## 2021-10-15 ENCOUNTER — NON-APPOINTMENT (OUTPATIENT)
Age: 76
End: 2021-10-15

## 2021-10-15 PROCEDURE — G2066: CPT

## 2021-10-15 PROCEDURE — 93298 REM INTERROG DEV EVAL SCRMS: CPT

## 2021-11-19 ENCOUNTER — APPOINTMENT (OUTPATIENT)
Dept: ELECTROPHYSIOLOGY | Facility: CLINIC | Age: 76
End: 2021-11-19
Payer: MEDICARE

## 2021-11-19 ENCOUNTER — NON-APPOINTMENT (OUTPATIENT)
Age: 76
End: 2021-11-19

## 2021-11-19 PROCEDURE — 93298 REM INTERROG DEV EVAL SCRMS: CPT

## 2021-11-19 PROCEDURE — G2066: CPT | Mod: NC

## 2021-11-23 NOTE — ED ADULT NURSE NOTE - CAS DISCH TRANSFER METHOD
[facility]
[address]
[place]   [address]                     PATHOLOGY RPT PROCEDURE       
_______________________________________________________________________________
 
Name:       CAMRONMARY              Room #:         443-P       [facility] [place]  
M.R.#:      [id number]     Account #:      [id number]  
Admission:  11/20/21    Date of Birth:  [birth year]  
Discharge:  11/23/21                                    Report #:    [id number]
                                                        Path Case #: [id number]
_______________________________________________________________________________
 
[facility] Accession Number: [id number]
.                                                                01
Material submitted:                                        .
gallbladder - GALLBLADDER
.                                                                01
Clinical history:                                          .
CHOLECYSTITIS
.                                                                01
**********************************************************************
Diagnosis:
Gallbladder "gallbladder cholecystectomy":
- Acute gangrenous cholecystitis with extensive mucosal necrosis, acute
inflammation with abscess formation, edema and congestion.
- Cholelithiasis.
(SHA:pit; 11/23/2021)
QTP  11/23/2021  1350 Local
**********************************************************************
.                                                                01
Electronically signed:                                     .
Sb Amezquita MD, Pathologist
NPI- [id number]
.                                                                01
Gross description:                                         .
Fixative: Formalin
Labeled: Gallbladder
Specimen received: Previously opened cholecystectomy specimen
Dimensions: 8.6 x 4.6 x 2.7 cm
Serosa: Tan-pink and edematous with focal hemorrhage and purulent exudate
and a full-thickness defect in the body measuring 0.7 cm
Lymph node: Not present
Mucosa: Dark green and velvety
Average wall thickness: Ranges from 0.3-0.5 cm
Calculi: Yes, within the gallbladder and container are multiple gritty
black calculi surrounded by thick dark brown bile.  The calculi measure in
aggregate 1.0 x 0.8 x 0.3 cm and range from 0.1-0.5 cm in greatest
dimension
Abnormalities: None identified
A1- Representative body, fundus, and the cystic duct margin. ([facility];
11/22/2021)
[facility]  11/22/2021  1611 Local
.                                                                01
Pathologist provided ICD-10:
K80.00
.                                                                01
CPT                                                        .
705723
Specimen Comment: A courtesy copy of this report has been sent to [phone], [place] [address]                     PATHOLOGY RPT PROCEDURE       
_______________________________________________________________________________
 
Name:       CAMRONMARY              Room #:         443-P       [facility] [place]  
.R.#:      [id number]     Account #:      [id number]  
Admission:  11/20/21    Date of Birth:  [birth year]  
Discharge:  11/23/21                                    Report #:    [id number]
                                                        Path Case #: [id number]
_______________________________________________________________________________
[phone]
Specimen Comment: [id number], [phone]
Specimen Comment: Report sent to , DR FISHER / DR GOODSON
***Performed at:  01
   [address], [place], [place]  [id number]
   MD Sb Amezquita MD Phone:  [phone] Private car

## 2021-12-10 ENCOUNTER — APPOINTMENT (OUTPATIENT)
Dept: ELECTROPHYSIOLOGY | Facility: CLINIC | Age: 76
End: 2021-12-10
Payer: MEDICARE

## 2021-12-10 VITALS
RESPIRATION RATE: 14 BRPM | SYSTOLIC BLOOD PRESSURE: 134 MMHG | HEART RATE: 89 BPM | OXYGEN SATURATION: 98 % | WEIGHT: 102 LBS | DIASTOLIC BLOOD PRESSURE: 95 MMHG | BODY MASS INDEX: 19.26 KG/M2 | HEIGHT: 61 IN

## 2021-12-10 PROCEDURE — 93285 PRGRMG DEV EVAL SCRMS IP: CPT

## 2021-12-10 PROCEDURE — 99211 OFF/OP EST MAY X REQ PHY/QHP: CPT

## 2021-12-10 RX ORDER — ATORVASTATIN CALCIUM 10 MG/1
10 TABLET, FILM COATED ORAL
Qty: 90 | Refills: 0 | Status: ACTIVE | COMMUNITY
Start: 2021-12-10

## 2021-12-10 RX ORDER — DULOXETINE HYDROCHLORIDE 60 MG/1
60 CAPSULE, DELAYED RELEASE PELLETS ORAL DAILY
Refills: 0 | Status: ACTIVE | COMMUNITY
Start: 2021-12-10

## 2021-12-10 RX ORDER — PREDNISONE 20 MG/1
20 TABLET ORAL
Qty: 21 | Refills: 1 | Status: DISCONTINUED | COMMUNITY
Start: 2021-05-06 | End: 2021-12-10
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
## 2022-01-05 ENCOUNTER — APPOINTMENT (OUTPATIENT)
Dept: OTOLARYNGOLOGY | Facility: CLINIC | Age: 77
End: 2022-01-05
Payer: MEDICARE

## 2022-01-05 VITALS — BODY MASS INDEX: 18.5 KG/M2 | HEIGHT: 61 IN | WEIGHT: 98 LBS | TEMPERATURE: 97.2 F

## 2022-01-05 DIAGNOSIS — H61.23 IMPACTED CERUMEN, BILATERAL: ICD-10-CM

## 2022-01-05 DIAGNOSIS — M26.609 UNSPECIFIED TEMPOROMANDIBULAR JOINT DISORDER: ICD-10-CM

## 2022-01-05 PROCEDURE — 92567 TYMPANOMETRY: CPT

## 2022-01-05 PROCEDURE — 92557 COMPREHENSIVE HEARING TEST: CPT

## 2022-01-05 PROCEDURE — 99213 OFFICE O/P EST LOW 20 MIN: CPT | Mod: 25

## 2022-01-05 PROCEDURE — G0268 REMOVAL OF IMPACTED WAX MD: CPT | Mod: RT

## 2022-01-05 NOTE — DATA REVIEWED
[de-identified] : Significant decrease in left ear--very poor discrimination\par Decrease at 1000 Hz right ear\par Type A tymps

## 2022-01-05 NOTE — ASSESSMENT
[FreeTextEntry1] : cerumen cleared ad\par reviewed mri 5/21-neg study \par audio as sn loss ow moderate to severe w poor discrim\par tmj pain\par sudden hearing loss hx and now acutely worse\par pred 20 tid and taper\par consult otology consider transtympanic injection

## 2022-01-05 NOTE — REASON FOR VISIT
[Subsequent Evaluation] : a subsequent evaluation for [Hearing Loss] : hearing loss [Tinnitus] : tinnitus [FreeTextEntry2] : jaw

## 2022-01-05 NOTE — PHYSICAL EXAM
[Midline] : trachea located in midline position [Normal] : no rashes [de-identified] : click and asymmetry left pain
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
## 2022-01-13 ENCOUNTER — APPOINTMENT (OUTPATIENT)
Dept: OTOLARYNGOLOGY | Facility: CLINIC | Age: 77
End: 2022-01-13
Payer: MEDICARE

## 2022-01-13 VITALS
WEIGHT: 98 LBS | SYSTOLIC BLOOD PRESSURE: 137 MMHG | DIASTOLIC BLOOD PRESSURE: 91 MMHG | BODY MASS INDEX: 18.5 KG/M2 | HEIGHT: 61 IN | HEART RATE: 90 BPM

## 2022-01-13 DIAGNOSIS — H90.3 SENSORINEURAL HEARING LOSS, BILATERAL: ICD-10-CM

## 2022-01-13 DIAGNOSIS — H91.22 SUDDEN IDIOPATHIC HEARING LOSS, LEFT EAR: ICD-10-CM

## 2022-01-13 DIAGNOSIS — H93.12 TINNITUS, LEFT EAR: ICD-10-CM

## 2022-01-13 DIAGNOSIS — H69.83 OTHER SPECIFIED DISORDERS OF EUSTACHIAN TUBE, BILATERAL: ICD-10-CM

## 2022-01-13 PROCEDURE — 69801 INCISE INNER EAR: CPT

## 2022-01-13 PROCEDURE — 99214 OFFICE O/P EST MOD 30 MIN: CPT | Mod: 25

## 2022-01-13 PROCEDURE — 92567 TYMPANOMETRY: CPT

## 2022-01-13 PROCEDURE — 92557 COMPREHENSIVE HEARING TEST: CPT

## 2022-01-13 NOTE — DATA REVIEWED
[de-identified] : I personally ordered and reviewed an audiogram for the patient's abnormal auditory perception, which shows:\par \par Tymps: Type A, au\par Right: WNL - moderate snhl 250-8khz\par Left: severe - moderately-severe snhl 250-8khz\par Recs: 1) ENT f/u 2) re-eval/ further testing as per md 3) Further recs pending above

## 2022-01-13 NOTE — PROCEDURE
[FreeTextEntry3] : Procedure note: Left intratympanic steroid injection.\par \par Jan 13, 2022 \par \par Description of Operative Procedure:  Risks, benefits, and alternatives of the plan and procedure were discussed with the patient prior to proceeding.  Risks would include but are not limited to bleeding, infection, persistent pain, persistent drainage, dizziness, or failure to improve hearing.  Benefits would include improvement in hearing loss.  The patient agreed to proceed.  Topical phenol was used to anesthetize the eardrum.  Using a long #27 gauge needle, 0.6 cc of dexamethasone 10 mg/ml was injected into the middle ear space.  The patient remained in the supine position for 15 minutes and tolerated the procedure without complications.
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
## 2022-01-19 ENCOUNTER — NON-APPOINTMENT (OUTPATIENT)
Age: 77
End: 2022-01-19

## 2022-01-24 ENCOUNTER — NON-APPOINTMENT (OUTPATIENT)
Age: 77
End: 2022-01-24

## 2022-02-01 ENCOUNTER — APPOINTMENT (OUTPATIENT)
Dept: ELECTROPHYSIOLOGY | Facility: CLINIC | Age: 77
End: 2022-02-01
Payer: MEDICARE

## 2022-02-01 ENCOUNTER — NON-APPOINTMENT (OUTPATIENT)
Age: 77
End: 2022-02-01

## 2022-02-01 PROCEDURE — G2066: CPT

## 2022-02-01 PROCEDURE — 93298 REM INTERROG DEV EVAL SCRMS: CPT

## 2022-02-20 ENCOUNTER — EMERGENCY (EMERGENCY)
Facility: HOSPITAL | Age: 77
LOS: 0 days | Discharge: HOME | End: 2022-02-20
Attending: EMERGENCY MEDICINE
Payer: MEDICARE

## 2022-02-20 VITALS
TEMPERATURE: 98 F | HEART RATE: 67 BPM | RESPIRATION RATE: 15 BRPM | OXYGEN SATURATION: 99 % | HEIGHT: 60 IN | DIASTOLIC BLOOD PRESSURE: 95 MMHG | WEIGHT: 98.11 LBS | SYSTOLIC BLOOD PRESSURE: 146 MMHG

## 2022-02-20 VITALS
SYSTOLIC BLOOD PRESSURE: 135 MMHG | RESPIRATION RATE: 16 BRPM | OXYGEN SATURATION: 99 % | DIASTOLIC BLOOD PRESSURE: 90 MMHG | HEART RATE: 70 BPM

## 2022-02-20 DIAGNOSIS — R42 DIZZINESS AND GIDDINESS: ICD-10-CM

## 2022-02-20 DIAGNOSIS — Z88.6 ALLERGY STATUS TO ANALGESIC AGENT: ICD-10-CM

## 2022-02-20 DIAGNOSIS — R11.10 VOMITING, UNSPECIFIED: ICD-10-CM

## 2022-02-20 DIAGNOSIS — I10 ESSENTIAL (PRIMARY) HYPERTENSION: ICD-10-CM

## 2022-02-20 DIAGNOSIS — Z88.5 ALLERGY STATUS TO NARCOTIC AGENT: ICD-10-CM

## 2022-02-20 DIAGNOSIS — Z98.890 OTHER SPECIFIED POSTPROCEDURAL STATES: Chronic | ICD-10-CM

## 2022-02-20 LAB
ALBUMIN SERPL ELPH-MCNC: 3.6 G/DL — SIGNIFICANT CHANGE UP (ref 3.3–5)
ALP SERPL-CCNC: 64 U/L — SIGNIFICANT CHANGE UP (ref 40–120)
ALT FLD-CCNC: 24 U/L — SIGNIFICANT CHANGE UP (ref 12–78)
ANION GAP SERPL CALC-SCNC: 8 MMOL/L — SIGNIFICANT CHANGE UP (ref 5–17)
AST SERPL-CCNC: 21 U/L — SIGNIFICANT CHANGE UP (ref 15–37)
BASOPHILS # BLD AUTO: 0.03 K/UL — SIGNIFICANT CHANGE UP (ref 0–0.2)
BASOPHILS NFR BLD AUTO: 0.4 % — SIGNIFICANT CHANGE UP (ref 0–2)
BILIRUB SERPL-MCNC: 0.4 MG/DL — SIGNIFICANT CHANGE UP (ref 0.2–1.2)
BUN SERPL-MCNC: 21 MG/DL — SIGNIFICANT CHANGE UP (ref 7–23)
CALCIUM SERPL-MCNC: 9 MG/DL — SIGNIFICANT CHANGE UP (ref 8.5–10.1)
CHLORIDE SERPL-SCNC: 108 MMOL/L — SIGNIFICANT CHANGE UP (ref 96–108)
CO2 SERPL-SCNC: 24 MMOL/L — SIGNIFICANT CHANGE UP (ref 22–31)
CREAT SERPL-MCNC: 0.8 MG/DL — SIGNIFICANT CHANGE UP (ref 0.5–1.3)
EOSINOPHIL # BLD AUTO: 0.04 K/UL — SIGNIFICANT CHANGE UP (ref 0–0.5)
EOSINOPHIL NFR BLD AUTO: 0.5 % — SIGNIFICANT CHANGE UP (ref 0–6)
GLUCOSE SERPL-MCNC: 138 MG/DL — HIGH (ref 70–99)
HCT VFR BLD CALC: 37.8 % — SIGNIFICANT CHANGE UP (ref 34.5–45)
HGB BLD-MCNC: 13.2 G/DL — SIGNIFICANT CHANGE UP (ref 11.5–15.5)
IMM GRANULOCYTES NFR BLD AUTO: 1 % — SIGNIFICANT CHANGE UP (ref 0–1.5)
LYMPHOCYTES # BLD AUTO: 1.26 K/UL — SIGNIFICANT CHANGE UP (ref 1–3.3)
LYMPHOCYTES # BLD AUTO: 16.3 % — SIGNIFICANT CHANGE UP (ref 13–44)
MCHC RBC-ENTMCNC: 32.3 PG — SIGNIFICANT CHANGE UP (ref 27–34)
MCHC RBC-ENTMCNC: 34.9 GM/DL — SIGNIFICANT CHANGE UP (ref 32–36)
MCV RBC AUTO: 92.4 FL — SIGNIFICANT CHANGE UP (ref 80–100)
MONOCYTES # BLD AUTO: 0.57 K/UL — SIGNIFICANT CHANGE UP (ref 0–0.9)
MONOCYTES NFR BLD AUTO: 7.4 % — SIGNIFICANT CHANGE UP (ref 2–14)
NEUTROPHILS # BLD AUTO: 5.76 K/UL — SIGNIFICANT CHANGE UP (ref 1.8–7.4)
NEUTROPHILS NFR BLD AUTO: 74.4 % — SIGNIFICANT CHANGE UP (ref 43–77)
PLATELET # BLD AUTO: 302 K/UL — SIGNIFICANT CHANGE UP (ref 150–400)
POTASSIUM SERPL-MCNC: 3.9 MMOL/L — SIGNIFICANT CHANGE UP (ref 3.5–5.3)
POTASSIUM SERPL-SCNC: 3.9 MMOL/L — SIGNIFICANT CHANGE UP (ref 3.5–5.3)
PROT SERPL-MCNC: 7 GM/DL — SIGNIFICANT CHANGE UP (ref 6–8.3)
RBC # BLD: 4.09 M/UL — SIGNIFICANT CHANGE UP (ref 3.8–5.2)
RBC # FLD: 13.2 % — SIGNIFICANT CHANGE UP (ref 10.3–14.5)
SODIUM SERPL-SCNC: 140 MMOL/L — SIGNIFICANT CHANGE UP (ref 135–145)
TROPONIN I, HIGH SENSITIVITY RESULT: 3.55 NG/L — SIGNIFICANT CHANGE UP
WBC # BLD: 7.74 K/UL — SIGNIFICANT CHANGE UP (ref 3.8–10.5)
WBC # FLD AUTO: 7.74 K/UL — SIGNIFICANT CHANGE UP (ref 3.8–10.5)

## 2022-02-20 PROCEDURE — 96374 THER/PROPH/DIAG INJ IV PUSH: CPT

## 2022-02-20 PROCEDURE — 80053 COMPREHEN METABOLIC PANEL: CPT

## 2022-02-20 PROCEDURE — 85025 COMPLETE CBC W/AUTO DIFF WBC: CPT

## 2022-02-20 PROCEDURE — 99285 EMERGENCY DEPT VISIT HI MDM: CPT | Mod: 25

## 2022-02-20 PROCEDURE — 70450 CT HEAD/BRAIN W/O DYE: CPT | Mod: MA

## 2022-02-20 PROCEDURE — 93010 ELECTROCARDIOGRAM REPORT: CPT

## 2022-02-20 PROCEDURE — 84484 ASSAY OF TROPONIN QUANT: CPT

## 2022-02-20 PROCEDURE — 99285 EMERGENCY DEPT VISIT HI MDM: CPT

## 2022-02-20 PROCEDURE — 93005 ELECTROCARDIOGRAM TRACING: CPT

## 2022-02-20 PROCEDURE — 70450 CT HEAD/BRAIN W/O DYE: CPT | Mod: 26,MA

## 2022-02-20 PROCEDURE — 36415 COLL VENOUS BLD VENIPUNCTURE: CPT

## 2022-02-20 RX ORDER — MECLIZINE HCL 12.5 MG
25 TABLET ORAL ONCE
Refills: 0 | Status: COMPLETED | OUTPATIENT
Start: 2022-02-20 | End: 2022-02-20

## 2022-02-20 RX ORDER — MECLIZINE HCL 12.5 MG
1 TABLET ORAL
Qty: 24 | Refills: 0
Start: 2022-02-20 | End: 2022-02-25

## 2022-02-20 RX ORDER — SODIUM CHLORIDE 9 MG/ML
1000 INJECTION INTRAMUSCULAR; INTRAVENOUS; SUBCUTANEOUS ONCE
Refills: 0 | Status: COMPLETED | OUTPATIENT
Start: 2022-02-20 | End: 2022-02-20

## 2022-02-20 RX ORDER — ONDANSETRON 8 MG/1
4 TABLET, FILM COATED ORAL ONCE
Refills: 0 | Status: COMPLETED | OUTPATIENT
Start: 2022-02-20 | End: 2022-02-20

## 2022-02-20 RX ADMIN — Medication 25 MILLIGRAM(S): at 13:43

## 2022-02-20 RX ADMIN — Medication 25 MILLIGRAM(S): at 15:41

## 2022-02-20 RX ADMIN — SODIUM CHLORIDE 2000 MILLILITER(S): 9 INJECTION INTRAMUSCULAR; INTRAVENOUS; SUBCUTANEOUS at 13:42

## 2022-02-20 RX ADMIN — ONDANSETRON 4 MILLIGRAM(S): 8 TABLET, FILM COATED ORAL at 13:42

## 2022-02-20 NOTE — ED ADULT TRIAGE NOTE - CHIEF COMPLAINT QUOTE
Patient comes in with sudden onset of dizziness & vomiting. Patient has hx of vertigo. Denies chest pain. MD Nix assessed for code stroke, no stroke to be called at this time.

## 2022-02-20 NOTE — ED PROVIDER NOTE - NEUROLOGICAL, MLM
Alert and oriented, no focal deficits, no motor or sensory deficits. cranial nerves 2-12 grossly  intact,  normal finger to nose, no pronator drift.

## 2022-02-20 NOTE — ED PROVIDER NOTE - OBJECTIVE STATEMENT
77 y/o female with a PMHx of hypothyroid, HTN presents to the ED c/o dizziness. +vomiting. States she had vertigo today during Christianity. Similar hx  in the past. Denies weakness, chills, fever, CP, SOB. States that when she moves her head it hurts.

## 2022-02-20 NOTE — ED ADULT NURSE NOTE - NSHOSCREENINGQ1_ED_ALL_ED
TXP DERREK INTERROGATIONS 6/14/2017 5/17/2017 5/4/2017 4/6/2017 3/30/2017 3/30/2017 3/30/2017   Type Heartware Heartware Heartware Heartware - - -   Flow 4.0 4 3.9 4.5 - - -   Speed 2700 2700 2700 2700 - - -   Power ([place]) 4.6 4.3 4.4 4.1 - - -   Low Flow Alarm 2.5 2.5 2.5 2.5 - - -   High Power Alarm 6.0 6.0 6.0 6.0 - - -   Pulsatility Intermittent pulse Intermittent pulse Intermittent pulse Intermittent pulse Intermittent pulse No Pulse Intermittent pulse   }  
No

## 2022-02-20 NOTE — ED ADULT NURSE NOTE - OBJECTIVE STATEMENT
pt presents to ed via ems from [facility] for evaluation of episode of vertigo- pt has hx of veritgo, pt vitals stable, ekg done

## 2022-02-20 NOTE — ED PROVIDER NOTE - PATIENT PORTAL LINK FT
You can access the FollowMyHealth Patient Portal offered by [facility] by registering at the following website: [email or web address]. By joining [employer]’s FollowMyHealth portal, you will also be able to view your health information using other applications (apps) compatible with our system.

## 2022-02-20 NOTE — ED PROVIDER NOTE - PROGRESS NOTE DETAILS
CC:  Received signout from Dr. Sanz to f/u & reassess pt's symptoms/ambulation status.  Pt + symptomatically improved after 2nd dose of meclizine, stable gait.  Pt agrees with D/C home on po meds.

## 2022-02-20 NOTE — ED PROVIDER NOTE - NSFOLLOWUPINSTRUCTIONS_ED_ALL_ED_FT
Take medication as prescribed.  Continue your regular medications as per routine.  Follow up early this week with your regular doctor.  Avoid sudden head/neck movements.        Vertigo    WHAT YOU NEED TO KNOW:    Vertigo is a condition that causes you to feel dizzy. You may feel that you or everything around you is moving or spinning. You may also feel like you are being pulled down or toward your side.     DISCHARGE INSTRUCTIONS:    Return to the emergency department if:   •You have a headache and a stiff neck.      •You have shaking chills and a fever.       •You vomit over and over with no relief.       •You have blood, pus, or fluid coming out of your ears.      •You are confused.       Contact your healthcare provider if:   •Your symptoms do not get better with treatment.       •You have questions about your condition or care.      Medicines:   •Medicine may be given to help relieve your symptoms.      •Take your medicine as directed. Contact your healthcare provider if you think your medicine is not helping or if you have side effects. Tell him or her if you are allergic to any medicine. Keep a list of the medicines, vitamins, and herbs you take. Include the amounts, and when and why you take them. Bring the list or the pill bottles to follow-up visits. Carry your medicine list with you in case of an emergency.      Manage your symptoms:   •Do not drive, walk without help, or operate heavy machinery when you are dizzy.       •Move slowly when you move from one position to another position. Get up slowly from sitting or lying down. Sit or lie down right away if you feel dizzy.      •Drink plenty of liquids. Liquids help prevent dehydration. Ask how much liquid to drink each day and which liquids are best for you.      •Vestibular and balance rehabilitation therapy (VBRT) is used to teach you exercises to improve your balance and strength. These exercises may help decrease your vertigo and improve your balance. Ask for more information about this therapy.      Follow up with your doctor as directed: Write down your questions so you remember to ask them during your visits.

## 2022-03-07 ENCOUNTER — APPOINTMENT (OUTPATIENT)
Dept: ELECTROPHYSIOLOGY | Facility: CLINIC | Age: 77
End: 2022-03-07
Payer: MEDICARE

## 2022-03-07 ENCOUNTER — NON-APPOINTMENT (OUTPATIENT)
Age: 77
End: 2022-03-07

## 2022-03-07 PROCEDURE — 93298 REM INTERROG DEV EVAL SCRMS: CPT

## 2022-03-07 PROCEDURE — G2066: CPT

## 2022-03-18 ENCOUNTER — APPOINTMENT (OUTPATIENT)
Dept: OTOLARYNGOLOGY | Facility: CLINIC | Age: 77
End: 2022-03-18

## 2022-03-24 ENCOUNTER — APPOINTMENT (OUTPATIENT)
Dept: OTOLARYNGOLOGY | Facility: CLINIC | Age: 77
End: 2022-03-24

## 2022-04-11 ENCOUNTER — NON-APPOINTMENT (OUTPATIENT)
Age: 77
End: 2022-04-11

## 2022-04-11 ENCOUNTER — APPOINTMENT (OUTPATIENT)
Dept: ELECTROPHYSIOLOGY | Facility: CLINIC | Age: 77
End: 2022-04-11
Payer: MEDICARE

## 2022-04-11 PROCEDURE — G2066: CPT

## 2022-04-11 PROCEDURE — 93298 REM INTERROG DEV EVAL SCRMS: CPT

## 2022-05-15 ENCOUNTER — NON-APPOINTMENT (OUTPATIENT)
Age: 77
End: 2022-05-15

## 2022-05-16 ENCOUNTER — APPOINTMENT (OUTPATIENT)
Dept: ELECTROPHYSIOLOGY | Facility: CLINIC | Age: 77
End: 2022-05-16
Payer: MEDICARE

## 2022-05-16 ENCOUNTER — NON-APPOINTMENT (OUTPATIENT)
Age: 77
End: 2022-05-16

## 2022-05-16 PROCEDURE — 93298 REM INTERROG DEV EVAL SCRMS: CPT

## 2022-05-16 PROCEDURE — G2066: CPT

## 2022-06-10 ENCOUNTER — APPOINTMENT (OUTPATIENT)
Dept: ELECTROPHYSIOLOGY | Facility: CLINIC | Age: 77
End: 2022-06-10
Payer: MEDICARE

## 2022-06-10 ENCOUNTER — NON-APPOINTMENT (OUTPATIENT)
Age: 77
End: 2022-06-10

## 2022-06-10 VITALS
SYSTOLIC BLOOD PRESSURE: 135 MMHG | HEART RATE: 80 BPM | DIASTOLIC BLOOD PRESSURE: 101 MMHG | BODY MASS INDEX: 18.5 KG/M2 | OXYGEN SATURATION: 98 % | HEIGHT: 61 IN | RESPIRATION RATE: 14 BRPM | WEIGHT: 98 LBS

## 2022-06-10 VITALS — SYSTOLIC BLOOD PRESSURE: 130 MMHG | DIASTOLIC BLOOD PRESSURE: 88 MMHG

## 2022-06-10 DIAGNOSIS — R00.0 TACHYCARDIA, UNSPECIFIED: ICD-10-CM

## 2022-06-10 PROCEDURE — 93285 PRGRMG DEV EVAL SCRMS IP: CPT

## 2022-06-10 PROCEDURE — 99211 OFF/OP EST MAY X REQ PHY/QHP: CPT

## 2022-06-10 RX ORDER — PREDNISONE 20 MG/1
20 TABLET ORAL DAILY
Qty: 30 | Refills: 2 | Status: DISCONTINUED | COMMUNITY
Start: 2022-01-05 | End: 2022-06-10

## 2022-06-10 NOTE — HISTORY OF PRESENT ILLNESS
[FreeTextEntry1] : 76 year old female with history of tachycardia and palpitations with ILR in place.  Patient presents for routine ILR follow up.  Denies any CP, palpitations, SOB, dizziness, lightheadedness, near syncope or syncope.

## 2022-06-10 NOTE — ASSESSMENT
[FreeTextEntry1] : 76 year old with ILR in place.  ILR interrogation reveals multiple episodes marked at AT c/w SR or oversensing of T wave.

## 2022-07-17 ENCOUNTER — APPOINTMENT (OUTPATIENT)
Dept: ELECTROPHYSIOLOGY | Facility: CLINIC | Age: 77
End: 2022-07-17

## 2022-07-18 ENCOUNTER — NON-APPOINTMENT (OUTPATIENT)
Age: 77
End: 2022-07-18

## 2022-07-18 PROCEDURE — 93298 REM INTERROG DEV EVAL SCRMS: CPT

## 2022-07-18 PROCEDURE — G2066: CPT

## 2022-08-22 ENCOUNTER — NON-APPOINTMENT (OUTPATIENT)
Age: 77
End: 2022-08-22

## 2022-08-22 ENCOUNTER — APPOINTMENT (OUTPATIENT)
Dept: ELECTROPHYSIOLOGY | Facility: CLINIC | Age: 77
End: 2022-08-22

## 2022-08-22 PROCEDURE — 93298 REM INTERROG DEV EVAL SCRMS: CPT

## 2022-08-22 PROCEDURE — G2066: CPT

## 2022-09-25 ENCOUNTER — APPOINTMENT (OUTPATIENT)
Dept: ELECTROPHYSIOLOGY | Facility: CLINIC | Age: 77
End: 2022-09-25

## 2022-09-26 ENCOUNTER — NON-APPOINTMENT (OUTPATIENT)
Age: 77
End: 2022-09-26

## 2022-09-26 PROCEDURE — 93298 REM INTERROG DEV EVAL SCRMS: CPT

## 2022-09-26 PROCEDURE — G2066: CPT

## 2022-10-31 ENCOUNTER — APPOINTMENT (OUTPATIENT)
Dept: ELECTROPHYSIOLOGY | Facility: CLINIC | Age: 77
End: 2022-10-31

## 2022-10-31 ENCOUNTER — NON-APPOINTMENT (OUTPATIENT)
Age: 77
End: 2022-10-31

## 2022-10-31 PROCEDURE — 93298 REM INTERROG DEV EVAL SCRMS: CPT

## 2022-10-31 PROCEDURE — G2066: CPT

## 2022-11-17 NOTE — PHYSICAL THERAPY INITIAL EVALUATION ADULT - PATIENT/FAMILY AGREES WITH PLAN
yes

## 2022-12-13 ENCOUNTER — APPOINTMENT (OUTPATIENT)
Dept: ELECTROPHYSIOLOGY | Facility: CLINIC | Age: 77
End: 2022-12-13

## 2022-12-13 ENCOUNTER — NON-APPOINTMENT (OUTPATIENT)
Age: 77
End: 2022-12-13

## 2022-12-13 VITALS
HEART RATE: 80 BPM | OXYGEN SATURATION: 100 % | BODY MASS INDEX: 20.77 KG/M2 | SYSTOLIC BLOOD PRESSURE: 142 MMHG | WEIGHT: 110 LBS | HEIGHT: 61 IN | DIASTOLIC BLOOD PRESSURE: 96 MMHG

## 2022-12-13 DIAGNOSIS — Z87.898 PERSONAL HISTORY OF OTHER SPECIFIED CONDITIONS: ICD-10-CM

## 2022-12-13 PROCEDURE — 99212 OFFICE O/P EST SF 10 MIN: CPT

## 2022-12-13 PROCEDURE — 93285 PRGRMG DEV EVAL SCRMS IP: CPT
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
## 2023-01-18 ENCOUNTER — NON-APPOINTMENT (OUTPATIENT)
Age: 78
End: 2023-01-18

## 2023-01-18 ENCOUNTER — APPOINTMENT (OUTPATIENT)
Dept: ELECTROPHYSIOLOGY | Facility: CLINIC | Age: 78
End: 2023-01-18
Payer: MEDICARE

## 2023-01-18 PROCEDURE — G2066: CPT

## 2023-01-18 PROCEDURE — 93298 REM INTERROG DEV EVAL SCRMS: CPT

## 2023-02-23 ENCOUNTER — APPOINTMENT (OUTPATIENT)
Dept: ELECTROPHYSIOLOGY | Facility: CLINIC | Age: 78
End: 2023-02-23
Payer: MEDICARE

## 2023-02-23 ENCOUNTER — NON-APPOINTMENT (OUTPATIENT)
Age: 78
End: 2023-02-23

## 2023-02-23 PROCEDURE — G2066: CPT

## 2023-02-23 PROCEDURE — 93298 REM INTERROG DEV EVAL SCRMS: CPT

## 2023-03-27 ENCOUNTER — NON-APPOINTMENT (OUTPATIENT)
Age: 78
End: 2023-03-27

## 2023-03-27 ENCOUNTER — APPOINTMENT (OUTPATIENT)
Dept: ELECTROPHYSIOLOGY | Facility: CLINIC | Age: 78
End: 2023-03-27
Payer: MEDICARE

## 2023-03-27 PROCEDURE — 93298 REM INTERROG DEV EVAL SCRMS: CPT

## 2023-03-27 PROCEDURE — G2066: CPT

## 2023-05-02 ENCOUNTER — NON-APPOINTMENT (OUTPATIENT)
Age: 78
End: 2023-05-02

## 2023-05-02 ENCOUNTER — APPOINTMENT (OUTPATIENT)
Dept: ELECTROPHYSIOLOGY | Facility: CLINIC | Age: 78
End: 2023-05-02
Payer: MEDICARE

## 2023-05-02 PROCEDURE — 93298 REM INTERROG DEV EVAL SCRMS: CPT

## 2023-05-02 PROCEDURE — G2066: CPT

## 2023-06-06 ENCOUNTER — APPOINTMENT (OUTPATIENT)
Dept: ELECTROPHYSIOLOGY | Facility: CLINIC | Age: 78
End: 2023-06-06
Payer: MEDICARE

## 2023-06-06 ENCOUNTER — NON-APPOINTMENT (OUTPATIENT)
Age: 78
End: 2023-06-06

## 2023-06-06 VITALS
SYSTOLIC BLOOD PRESSURE: 130 MMHG | RESPIRATION RATE: 14 BRPM | DIASTOLIC BLOOD PRESSURE: 72 MMHG | HEART RATE: 80 BPM | WEIGHT: 110 LBS | OXYGEN SATURATION: 98 % | BODY MASS INDEX: 20.77 KG/M2 | HEIGHT: 61 IN

## 2023-06-06 DIAGNOSIS — R00.2 PALPITATIONS: ICD-10-CM

## 2023-06-06 DIAGNOSIS — Z45.09 ENCOUNTER FOR ADJUSTMENT AND MANAGEMENT OF OTHER CARDIAC DEVICE: ICD-10-CM

## 2023-06-06 PROCEDURE — 93285 PRGRMG DEV EVAL SCRMS IP: CPT

## 2023-06-06 RX ORDER — BUPROPION HYDROCHLORIDE 150 MG/1
150 TABLET, EXTENDED RELEASE ORAL DAILY
Refills: 0 | Status: DISCONTINUED | COMMUNITY
Start: 2021-12-10 | End: 2023-06-06

## 2023-06-06 RX ORDER — MIRTAZAPINE 7.5 MG/1
7.5 TABLET, FILM COATED ORAL
Refills: 0 | Status: DISCONTINUED | COMMUNITY
Start: 2021-12-10 | End: 2023-06-06

## 2023-07-11 ENCOUNTER — NON-APPOINTMENT (OUTPATIENT)
Age: 78
End: 2023-07-11

## 2023-07-11 ENCOUNTER — APPOINTMENT (OUTPATIENT)
Dept: ELECTROPHYSIOLOGY | Facility: CLINIC | Age: 78
End: 2023-07-11
Payer: MEDICARE

## 2023-07-11 PROCEDURE — 93298 REM INTERROG DEV EVAL SCRMS: CPT

## 2023-07-11 PROCEDURE — G2066: CPT

## 2023-07-25 ENCOUNTER — NON-APPOINTMENT (OUTPATIENT)
Age: 78
End: 2023-07-25

## 2023-07-26 ENCOUNTER — EMERGENCY (EMERGENCY)
Facility: HOSPITAL | Age: 78
LOS: 0 days | Discharge: HOME | End: 2023-07-27
Attending: STUDENT IN AN ORGANIZED HEALTH CARE EDUCATION/TRAINING PROGRAM
Payer: MEDICARE

## 2023-07-26 VITALS
HEIGHT: 61 IN | DIASTOLIC BLOOD PRESSURE: 97 MMHG | RESPIRATION RATE: 22 BRPM | WEIGHT: 104.94 LBS | HEART RATE: 65 BPM | SYSTOLIC BLOOD PRESSURE: 124 MMHG | TEMPERATURE: 98 F | OXYGEN SATURATION: 99 %

## 2023-07-26 DIAGNOSIS — R42 DIZZINESS AND GIDDINESS: ICD-10-CM

## 2023-07-26 DIAGNOSIS — R07.9 CHEST PAIN, UNSPECIFIED: ICD-10-CM

## 2023-07-26 DIAGNOSIS — R00.2 PALPITATIONS: ICD-10-CM

## 2023-07-26 DIAGNOSIS — R06.02 SHORTNESS OF BREATH: ICD-10-CM

## 2023-07-26 DIAGNOSIS — I10 ESSENTIAL (PRIMARY) HYPERTENSION: ICD-10-CM

## 2023-07-26 DIAGNOSIS — E03.9 HYPOTHYROIDISM, UNSPECIFIED: ICD-10-CM

## 2023-07-26 DIAGNOSIS — Z88.5 ALLERGY STATUS TO NARCOTIC AGENT: ICD-10-CM

## 2023-07-26 DIAGNOSIS — R41.3 OTHER AMNESIA: ICD-10-CM

## 2023-07-26 DIAGNOSIS — Z98.890 OTHER SPECIFIED POSTPROCEDURAL STATES: Chronic | ICD-10-CM

## 2023-07-26 LAB
ALBUMIN SERPL ELPH-MCNC: 3.6 G/DL — SIGNIFICANT CHANGE UP (ref 3.3–5)
ALP SERPL-CCNC: 56 U/L — SIGNIFICANT CHANGE UP (ref 40–120)
ALT FLD-CCNC: 20 U/L — SIGNIFICANT CHANGE UP (ref 12–78)
ANION GAP SERPL CALC-SCNC: 4 MMOL/L — LOW (ref 5–17)
APPEARANCE UR: CLEAR — SIGNIFICANT CHANGE UP
AST SERPL-CCNC: 14 U/L — LOW (ref 15–37)
BASOPHILS # BLD AUTO: 0.08 K/UL — SIGNIFICANT CHANGE UP (ref 0–0.2)
BASOPHILS NFR BLD AUTO: 1.3 % — SIGNIFICANT CHANGE UP (ref 0–2)
BILIRUB SERPL-MCNC: 0.3 MG/DL — SIGNIFICANT CHANGE UP (ref 0.2–1.2)
BILIRUB UR-MCNC: NEGATIVE — SIGNIFICANT CHANGE UP
BUN SERPL-MCNC: 20 MG/DL — SIGNIFICANT CHANGE UP (ref 7–23)
CALCIUM SERPL-MCNC: 8.7 MG/DL — SIGNIFICANT CHANGE UP (ref 8.5–10.1)
CHLORIDE SERPL-SCNC: 114 MMOL/L — HIGH (ref 96–108)
CO2 SERPL-SCNC: 24 MMOL/L — SIGNIFICANT CHANGE UP (ref 22–31)
COLOR SPEC: YELLOW — SIGNIFICANT CHANGE UP
CREAT SERPL-MCNC: 0.82 MG/DL — SIGNIFICANT CHANGE UP (ref 0.5–1.3)
DIFF PNL FLD: NEGATIVE — SIGNIFICANT CHANGE UP
EGFR: 75 ML/MIN/1.73M2 — SIGNIFICANT CHANGE UP
EOSINOPHIL # BLD AUTO: 0.47 K/UL — SIGNIFICANT CHANGE UP (ref 0–0.5)
EOSINOPHIL NFR BLD AUTO: 7.6 % — HIGH (ref 0–6)
GLUCOSE SERPL-MCNC: 101 MG/DL — HIGH (ref 70–99)
GLUCOSE UR QL: NEGATIVE — SIGNIFICANT CHANGE UP
HCT VFR BLD CALC: 37.8 % — SIGNIFICANT CHANGE UP (ref 34.5–45)
HGB BLD-MCNC: 13 G/DL — SIGNIFICANT CHANGE UP (ref 11.5–15.5)
IMM GRANULOCYTES NFR BLD AUTO: 0.3 % — SIGNIFICANT CHANGE UP (ref 0–0.9)
KETONES UR-MCNC: NEGATIVE — SIGNIFICANT CHANGE UP
LEUKOCYTE ESTERASE UR-ACNC: NEGATIVE — SIGNIFICANT CHANGE UP
LIDOCAIN IGE QN: 123 U/L — SIGNIFICANT CHANGE UP (ref 73–393)
LYMPHOCYTES # BLD AUTO: 2.07 K/UL — SIGNIFICANT CHANGE UP (ref 1–3.3)
LYMPHOCYTES # BLD AUTO: 33.6 % — SIGNIFICANT CHANGE UP (ref 13–44)
MAGNESIUM SERPL-MCNC: 2.2 MG/DL — SIGNIFICANT CHANGE UP (ref 1.6–2.6)
MCHC RBC-ENTMCNC: 31.9 PG — SIGNIFICANT CHANGE UP (ref 27–34)
MCHC RBC-ENTMCNC: 34.4 GM/DL — SIGNIFICANT CHANGE UP (ref 32–36)
MCV RBC AUTO: 92.6 FL — SIGNIFICANT CHANGE UP (ref 80–100)
MONOCYTES # BLD AUTO: 0.54 K/UL — SIGNIFICANT CHANGE UP (ref 0–0.9)
MONOCYTES NFR BLD AUTO: 8.8 % — SIGNIFICANT CHANGE UP (ref 2–14)
NEUTROPHILS # BLD AUTO: 2.98 K/UL — SIGNIFICANT CHANGE UP (ref 1.8–7.4)
NEUTROPHILS NFR BLD AUTO: 48.4 % — SIGNIFICANT CHANGE UP (ref 43–77)
NITRITE UR-MCNC: NEGATIVE — SIGNIFICANT CHANGE UP
NT-PROBNP SERPL-SCNC: 135 PG/ML — SIGNIFICANT CHANGE UP (ref 0–450)
PH UR: 8 — SIGNIFICANT CHANGE UP (ref 5–8)
PHOSPHATE SERPL-MCNC: 3.3 MG/DL — SIGNIFICANT CHANGE UP (ref 2.5–4.5)
PLATELET # BLD AUTO: 276 K/UL — SIGNIFICANT CHANGE UP (ref 150–400)
POTASSIUM SERPL-MCNC: 3.9 MMOL/L — SIGNIFICANT CHANGE UP (ref 3.5–5.3)
POTASSIUM SERPL-SCNC: 3.9 MMOL/L — SIGNIFICANT CHANGE UP (ref 3.5–5.3)
PROT SERPL-MCNC: 6.8 GM/DL — SIGNIFICANT CHANGE UP (ref 6–8.3)
PROT UR-MCNC: NEGATIVE — SIGNIFICANT CHANGE UP
RBC # BLD: 4.08 M/UL — SIGNIFICANT CHANGE UP (ref 3.8–5.2)
RBC # FLD: 13.2 % — SIGNIFICANT CHANGE UP (ref 10.3–14.5)
SODIUM SERPL-SCNC: 142 MMOL/L — SIGNIFICANT CHANGE UP (ref 135–145)
SP GR SPEC: 1.01 — SIGNIFICANT CHANGE UP (ref 1.01–1.02)
TROPONIN I, HIGH SENSITIVITY RESULT: 3.64 NG/L — SIGNIFICANT CHANGE UP
TSH SERPL-MCNC: 0.17 UU/ML — LOW (ref 0.34–4.82)
UROBILINOGEN FLD QL: NEGATIVE — SIGNIFICANT CHANGE UP
WBC # BLD: 6.16 K/UL — SIGNIFICANT CHANGE UP (ref 3.8–10.5)
WBC # FLD AUTO: 6.16 K/UL — SIGNIFICANT CHANGE UP (ref 3.8–10.5)

## 2023-07-26 PROCEDURE — 85025 COMPLETE CBC W/AUTO DIFF WBC: CPT

## 2023-07-26 PROCEDURE — 84484 ASSAY OF TROPONIN QUANT: CPT

## 2023-07-26 PROCEDURE — 81003 URINALYSIS AUTO W/O SCOPE: CPT

## 2023-07-26 PROCEDURE — 80053 COMPREHEN METABOLIC PANEL: CPT

## 2023-07-26 PROCEDURE — G1004: CPT

## 2023-07-26 PROCEDURE — 83690 ASSAY OF LIPASE: CPT

## 2023-07-26 PROCEDURE — 93010 ELECTROCARDIOGRAM REPORT: CPT

## 2023-07-26 PROCEDURE — 83880 ASSAY OF NATRIURETIC PEPTIDE: CPT

## 2023-07-26 PROCEDURE — 99285 EMERGENCY DEPT VISIT HI MDM: CPT | Mod: 25

## 2023-07-26 PROCEDURE — 83735 ASSAY OF MAGNESIUM: CPT

## 2023-07-26 PROCEDURE — 87086 URINE CULTURE/COLONY COUNT: CPT

## 2023-07-26 PROCEDURE — 70450 CT HEAD/BRAIN W/O DYE: CPT | Mod: MG

## 2023-07-26 PROCEDURE — 36415 COLL VENOUS BLD VENIPUNCTURE: CPT

## 2023-07-26 PROCEDURE — 84100 ASSAY OF PHOSPHORUS: CPT

## 2023-07-26 PROCEDURE — 71045 X-RAY EXAM CHEST 1 VIEW: CPT

## 2023-07-26 PROCEDURE — 99285 EMERGENCY DEPT VISIT HI MDM: CPT

## 2023-07-26 PROCEDURE — 84443 ASSAY THYROID STIM HORMONE: CPT

## 2023-07-26 PROCEDURE — 93005 ELECTROCARDIOGRAM TRACING: CPT

## 2023-07-26 PROCEDURE — 70450 CT HEAD/BRAIN W/O DYE: CPT | Mod: 26,MG

## 2023-07-26 PROCEDURE — 71045 X-RAY EXAM CHEST 1 VIEW: CPT | Mod: 26

## 2023-07-26 NOTE — ED ADULT NURSE NOTE - OBJECTIVE STATEMENT
78yo Female co confusion for a few days saying she's been feeling forgetful and has trouble remembering things. 76yo Female SIB [facility] co confusion for a few days saying she's been feeling forgetful and has trouble remembering things. Pt states that she was sent over by a doctor for dizziness and feeling forgetful. Pt co posterior left neck pain radiating to shoulder. Pt states she occasionally takes 81mg aspirin and states she has forgotten before. Pt states she has been feeling depressed since her  passed. Pt is a poor historian as she stated that she denied chest pain and SOB. Pt denies fever, headache. R forearm poison ivy rash
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
## 2023-07-26 NOTE — ED ADULT NURSE NOTE - NSFALLUNIVINTERV_ED_ALL_ED
Bed/Stretcher in lowest position, wheels locked, appropriate side rails in place/Call bell, personal items and telephone in reach/Instruct patient to call for assistance before getting out of bed/chair/stretcher/Non-slip footwear applied when patient is off stretcher/[place] to call system/Physically safe environment - no spills, clutter or unnecessary equipment/Purposeful proactive rounding/Room/bathroom lighting operational, light cord in reach
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
## 2023-07-26 NOTE — ED PROVIDER NOTE - CLINICAL SUMMARY MEDICAL DECISION MAKING FREE TEXT BOX
2337: Jeremiah SAMS: s/o received from Dr. Basurto pending urinalysis. UA noted and not suggestive of UTI.  Will DC home as planned. 77-year-old female who presents for medical evaluation.  She is seen in urgent care giving appropriate information was advised to come to the ED.  Patient overall well-appearing, neurologically intact, has multiple vague complaints.  Will evaluate for infectious etiology, electrolyte abnormality, dehydration, ACS, UTI.  If work-up negative, patient likely to be discharged home.    2337: Jeremiah SAMS: s/o received from Dr. Basurto pending urinalysis. UA noted and not suggestive of UTI.  Will DC home as planned. 77-year-old female who presents for medical evaluation.  She was seen in urgent care giving inappropriate information and was advised to come to the ED.  Patient overall well-appearing, neurologically intact, has multiple vague complaints.  Will evaluate for infectious etiology, electrolyte abnormality, dehydration, ACS, UTI.  If work-up negative, patient likely to be discharged home.    2337: Jeremiah SAMS: s/o received from Dr. Basurto pending urinalysis. UA noted and not suggestive of UTI.  Will DC home as planned.

## 2023-07-26 NOTE — ED PROVIDER NOTE - NSFOLLOWUPINSTRUCTIONS_ED_ALL_ED_FT
Please return for any new or worsening symptoms.  Please follow-up with your primary care doctor in the next 1 to 2 days.

## 2023-07-26 NOTE — ED PROVIDER NOTE - PATIENT PORTAL LINK FT
You can access the FollowMyHealth Patient Portal offered by [facility] by registering at the following website: [email or web address]. By joining [employer]’s FollowMyHealth portal, you will also be able to view your health information using other applications (apps) compatible with our system.

## 2023-07-27 VITALS
DIASTOLIC BLOOD PRESSURE: 93 MMHG | OXYGEN SATURATION: 97 % | SYSTOLIC BLOOD PRESSURE: 144 MMHG | RESPIRATION RATE: 18 BRPM | HEART RATE: 74 BPM | TEMPERATURE: 98 F

## 2023-07-27 NOTE — CHART NOTE - NSCHARTNOTEFT_GEN_A_CORE
Pt is a 78y/o female with a PMH of Medtronic loop recorder, palpitations, HTN, HLD, b/l SNHL, FMH of CAD presents to the ED SIB [facility] c/o dizziness. She reports she has previously felt like her current state, endorsing acute palpitations, CP radiating to the neck, and SOB.  RANDI consulted to assist with safe discharge. Per RN, pt's son, Fahad Arce [phone], had requested pt be sent home by cab at discharge. Pt appeared confused at that time, 1am. Pt held until morning for safe discharge plan.  RANDI met with pt at bedside, pt asking to go home. Pt currently resides alone in a 2 story home. Pt reports she is independent in ambulation and ADLs. Pt denies food, financial insecurity. Pt correctly answered date, current president, current location and situation, personal address, phone number, and . At pt's request, RANDI spoke with son, Fahad and reviewed discharge plan to home. Fahad in agreement with discharge to pt's home, however he will arrange [employer] from his account for @8:30am  from  ED entrance so pt does not have to use her credit card. Fahad expressed appreciation of pt's care during ED encounter. Case discussed with RN and MD who are in agreement with safe discharge plan.

## 2023-07-27 NOTE — ED ADULT NURSE REASSESSMENT NOTE - NS ED NURSE REASSESS COMMENT FT1
Pt is cleared for discharge home, spoke to patient's son Fahad who is unable to  pt, pt lives alone, will have SW see pt in the AM.

## 2023-07-28 LAB
CULTURE RESULTS: SIGNIFICANT CHANGE UP
SPECIMEN SOURCE: SIGNIFICANT CHANGE UP

## 2023-08-15 ENCOUNTER — NON-APPOINTMENT (OUTPATIENT)
Age: 78
End: 2023-08-15

## 2023-08-15 ENCOUNTER — APPOINTMENT (OUTPATIENT)
Dept: ELECTROPHYSIOLOGY | Facility: CLINIC | Age: 78
End: 2023-08-15
Payer: MEDICARE

## 2023-08-16 PROCEDURE — 93298 REM INTERROG DEV EVAL SCRMS: CPT

## 2023-08-16 PROCEDURE — G2066: CPT

## 2023-09-19 ENCOUNTER — NON-APPOINTMENT (OUTPATIENT)
Age: 78
End: 2023-09-19

## 2023-09-19 ENCOUNTER — APPOINTMENT (OUTPATIENT)
Dept: ELECTROPHYSIOLOGY | Facility: CLINIC | Age: 78
End: 2023-09-19
Payer: MEDICARE

## 2023-09-20 PROCEDURE — 93298 REM INTERROG DEV EVAL SCRMS: CPT

## 2023-09-20 PROCEDURE — G2066: CPT

## 2023-10-24 ENCOUNTER — APPOINTMENT (OUTPATIENT)
Dept: ELECTROPHYSIOLOGY | Facility: CLINIC | Age: 78
End: 2023-10-24
Payer: MEDICARE

## 2023-10-24 ENCOUNTER — NON-APPOINTMENT (OUTPATIENT)
Age: 78
End: 2023-10-24

## 2023-10-24 PROCEDURE — G2066: CPT

## 2023-10-24 PROCEDURE — 93298 REM INTERROG DEV EVAL SCRMS: CPT

## 2023-12-05 ENCOUNTER — APPOINTMENT (OUTPATIENT)
Dept: ELECTROPHYSIOLOGY | Facility: CLINIC | Age: 78
End: 2023-12-05

## 2023-12-16 ENCOUNTER — EMERGENCY (EMERGENCY)
Facility: HOSPITAL | Age: 78
LOS: 0 days | Discharge: HOME | End: 2023-12-16
Attending: EMERGENCY MEDICINE
Payer: MEDICARE

## 2023-12-16 VITALS
TEMPERATURE: 98 F | HEIGHT: 65 IN | OXYGEN SATURATION: 97 % | HEART RATE: 69 BPM | SYSTOLIC BLOOD PRESSURE: 135 MMHG | DIASTOLIC BLOOD PRESSURE: 94 MMHG | RESPIRATION RATE: 18 BRPM | WEIGHT: 138.89 LBS

## 2023-12-16 VITALS
DIASTOLIC BLOOD PRESSURE: 91 MMHG | HEART RATE: 67 BPM | RESPIRATION RATE: 18 BRPM | SYSTOLIC BLOOD PRESSURE: 121 MMHG | OXYGEN SATURATION: 100 %

## 2023-12-16 DIAGNOSIS — E03.9 HYPOTHYROIDISM, UNSPECIFIED: ICD-10-CM

## 2023-12-16 DIAGNOSIS — E78.5 HYPERLIPIDEMIA, UNSPECIFIED: ICD-10-CM

## 2023-12-16 DIAGNOSIS — Z88.5 ALLERGY STATUS TO NARCOTIC AGENT: ICD-10-CM

## 2023-12-16 DIAGNOSIS — R55 SYNCOPE AND COLLAPSE: ICD-10-CM

## 2023-12-16 DIAGNOSIS — M54.2 CERVICALGIA: ICD-10-CM

## 2023-12-16 DIAGNOSIS — R41.3 OTHER AMNESIA: ICD-10-CM

## 2023-12-16 DIAGNOSIS — R07.89 OTHER CHEST PAIN: ICD-10-CM

## 2023-12-16 DIAGNOSIS — I10 ESSENTIAL (PRIMARY) HYPERTENSION: ICD-10-CM

## 2023-12-16 DIAGNOSIS — Z98.890 OTHER SPECIFIED POSTPROCEDURAL STATES: Chronic | ICD-10-CM

## 2023-12-16 LAB
ALBUMIN SERPL ELPH-MCNC: 3.8 G/DL — SIGNIFICANT CHANGE UP (ref 3.3–5)
ALBUMIN SERPL ELPH-MCNC: 3.8 G/DL — SIGNIFICANT CHANGE UP (ref 3.3–5)
ALP SERPL-CCNC: 54 U/L — SIGNIFICANT CHANGE UP (ref 40–120)
ALP SERPL-CCNC: 54 U/L — SIGNIFICANT CHANGE UP (ref 40–120)
ALT FLD-CCNC: 18 U/L — SIGNIFICANT CHANGE UP (ref 12–78)
ALT FLD-CCNC: 18 U/L — SIGNIFICANT CHANGE UP (ref 12–78)
ANION GAP SERPL CALC-SCNC: 9 MMOL/L — SIGNIFICANT CHANGE UP (ref 5–17)
ANION GAP SERPL CALC-SCNC: 9 MMOL/L — SIGNIFICANT CHANGE UP (ref 5–17)
AST SERPL-CCNC: 16 U/L — SIGNIFICANT CHANGE UP (ref 15–37)
AST SERPL-CCNC: 16 U/L — SIGNIFICANT CHANGE UP (ref 15–37)
BASOPHILS # BLD AUTO: 0.06 K/UL — SIGNIFICANT CHANGE UP (ref 0–0.2)
BASOPHILS # BLD AUTO: 0.06 K/UL — SIGNIFICANT CHANGE UP (ref 0–0.2)
BASOPHILS NFR BLD AUTO: 1.2 % — SIGNIFICANT CHANGE UP (ref 0–2)
BASOPHILS NFR BLD AUTO: 1.2 % — SIGNIFICANT CHANGE UP (ref 0–2)
BILIRUB SERPL-MCNC: 0.8 MG/DL — SIGNIFICANT CHANGE UP (ref 0.2–1.2)
BILIRUB SERPL-MCNC: 0.8 MG/DL — SIGNIFICANT CHANGE UP (ref 0.2–1.2)
BUN SERPL-MCNC: 23 MG/DL — SIGNIFICANT CHANGE UP (ref 7–23)
BUN SERPL-MCNC: 23 MG/DL — SIGNIFICANT CHANGE UP (ref 7–23)
CALCIUM SERPL-MCNC: 9.4 MG/DL — SIGNIFICANT CHANGE UP (ref 8.5–10.1)
CALCIUM SERPL-MCNC: 9.4 MG/DL — SIGNIFICANT CHANGE UP (ref 8.5–10.1)
CHLORIDE SERPL-SCNC: 110 MMOL/L — HIGH (ref 96–108)
CHLORIDE SERPL-SCNC: 110 MMOL/L — HIGH (ref 96–108)
CO2 SERPL-SCNC: 23 MMOL/L — SIGNIFICANT CHANGE UP (ref 22–31)
CO2 SERPL-SCNC: 23 MMOL/L — SIGNIFICANT CHANGE UP (ref 22–31)
CREAT SERPL-MCNC: 0.93 MG/DL — SIGNIFICANT CHANGE UP (ref 0.5–1.3)
CREAT SERPL-MCNC: 0.93 MG/DL — SIGNIFICANT CHANGE UP (ref 0.5–1.3)
EGFR: 64 ML/MIN/1.73M2 — SIGNIFICANT CHANGE UP
EGFR: 64 ML/MIN/1.73M2 — SIGNIFICANT CHANGE UP
EOSINOPHIL # BLD AUTO: 0.06 K/UL — SIGNIFICANT CHANGE UP (ref 0–0.5)
EOSINOPHIL # BLD AUTO: 0.06 K/UL — SIGNIFICANT CHANGE UP (ref 0–0.5)
EOSINOPHIL NFR BLD AUTO: 1.2 % — SIGNIFICANT CHANGE UP (ref 0–6)
EOSINOPHIL NFR BLD AUTO: 1.2 % — SIGNIFICANT CHANGE UP (ref 0–6)
GLUCOSE SERPL-MCNC: 97 MG/DL — SIGNIFICANT CHANGE UP (ref 70–99)
GLUCOSE SERPL-MCNC: 97 MG/DL — SIGNIFICANT CHANGE UP (ref 70–99)
HCT VFR BLD CALC: 38.8 % — SIGNIFICANT CHANGE UP (ref 34.5–45)
HCT VFR BLD CALC: 38.8 % — SIGNIFICANT CHANGE UP (ref 34.5–45)
HGB BLD-MCNC: 13.4 G/DL — SIGNIFICANT CHANGE UP (ref 11.5–15.5)
HGB BLD-MCNC: 13.4 G/DL — SIGNIFICANT CHANGE UP (ref 11.5–15.5)
IMM GRANULOCYTES NFR BLD AUTO: 0.2 % — SIGNIFICANT CHANGE UP (ref 0–0.9)
IMM GRANULOCYTES NFR BLD AUTO: 0.2 % — SIGNIFICANT CHANGE UP (ref 0–0.9)
LYMPHOCYTES # BLD AUTO: 1.49 K/UL — SIGNIFICANT CHANGE UP (ref 1–3.3)
LYMPHOCYTES # BLD AUTO: 1.49 K/UL — SIGNIFICANT CHANGE UP (ref 1–3.3)
LYMPHOCYTES # BLD AUTO: 29.8 % — SIGNIFICANT CHANGE UP (ref 13–44)
LYMPHOCYTES # BLD AUTO: 29.8 % — SIGNIFICANT CHANGE UP (ref 13–44)
MCHC RBC-ENTMCNC: 31.5 PG — SIGNIFICANT CHANGE UP (ref 27–34)
MCHC RBC-ENTMCNC: 31.5 PG — SIGNIFICANT CHANGE UP (ref 27–34)
MCHC RBC-ENTMCNC: 34.5 GM/DL — SIGNIFICANT CHANGE UP (ref 32–36)
MCHC RBC-ENTMCNC: 34.5 GM/DL — SIGNIFICANT CHANGE UP (ref 32–36)
MCV RBC AUTO: 91.1 FL — SIGNIFICANT CHANGE UP (ref 80–100)
MCV RBC AUTO: 91.1 FL — SIGNIFICANT CHANGE UP (ref 80–100)
MONOCYTES # BLD AUTO: 0.46 K/UL — SIGNIFICANT CHANGE UP (ref 0–0.9)
MONOCYTES # BLD AUTO: 0.46 K/UL — SIGNIFICANT CHANGE UP (ref 0–0.9)
MONOCYTES NFR BLD AUTO: 9.2 % — SIGNIFICANT CHANGE UP (ref 2–14)
MONOCYTES NFR BLD AUTO: 9.2 % — SIGNIFICANT CHANGE UP (ref 2–14)
NEUTROPHILS # BLD AUTO: 2.92 K/UL — SIGNIFICANT CHANGE UP (ref 1.8–7.4)
NEUTROPHILS # BLD AUTO: 2.92 K/UL — SIGNIFICANT CHANGE UP (ref 1.8–7.4)
NEUTROPHILS NFR BLD AUTO: 58.4 % — SIGNIFICANT CHANGE UP (ref 43–77)
NEUTROPHILS NFR BLD AUTO: 58.4 % — SIGNIFICANT CHANGE UP (ref 43–77)
PLATELET # BLD AUTO: 277 K/UL — SIGNIFICANT CHANGE UP (ref 150–400)
PLATELET # BLD AUTO: 277 K/UL — SIGNIFICANT CHANGE UP (ref 150–400)
POTASSIUM SERPL-MCNC: 3.5 MMOL/L — SIGNIFICANT CHANGE UP (ref 3.5–5.3)
POTASSIUM SERPL-MCNC: 3.5 MMOL/L — SIGNIFICANT CHANGE UP (ref 3.5–5.3)
POTASSIUM SERPL-SCNC: 3.5 MMOL/L — SIGNIFICANT CHANGE UP (ref 3.5–5.3)
POTASSIUM SERPL-SCNC: 3.5 MMOL/L — SIGNIFICANT CHANGE UP (ref 3.5–5.3)
PROT SERPL-MCNC: 6.9 GM/DL — SIGNIFICANT CHANGE UP (ref 6–8.3)
PROT SERPL-MCNC: 6.9 GM/DL — SIGNIFICANT CHANGE UP (ref 6–8.3)
RBC # BLD: 4.26 M/UL — SIGNIFICANT CHANGE UP (ref 3.8–5.2)
RBC # BLD: 4.26 M/UL — SIGNIFICANT CHANGE UP (ref 3.8–5.2)
RBC # FLD: 12.9 % — SIGNIFICANT CHANGE UP (ref 10.3–14.5)
RBC # FLD: 12.9 % — SIGNIFICANT CHANGE UP (ref 10.3–14.5)
SODIUM SERPL-SCNC: 142 MMOL/L — SIGNIFICANT CHANGE UP (ref 135–145)
SODIUM SERPL-SCNC: 142 MMOL/L — SIGNIFICANT CHANGE UP (ref 135–145)
TROPONIN I, HIGH SENSITIVITY RESULT: 4.42 NG/L — SIGNIFICANT CHANGE UP
TROPONIN I, HIGH SENSITIVITY RESULT: 4.42 NG/L — SIGNIFICANT CHANGE UP
WBC # BLD: 5 K/UL — SIGNIFICANT CHANGE UP (ref 3.8–10.5)
WBC # BLD: 5 K/UL — SIGNIFICANT CHANGE UP (ref 3.8–10.5)
WBC # FLD AUTO: 5 K/UL — SIGNIFICANT CHANGE UP (ref 3.8–10.5)
WBC # FLD AUTO: 5 K/UL — SIGNIFICANT CHANGE UP (ref 3.8–10.5)

## 2023-12-16 PROCEDURE — 72125 CT NECK SPINE W/O DYE: CPT | Mod: 26,MA

## 2023-12-16 PROCEDURE — 70450 CT HEAD/BRAIN W/O DYE: CPT | Mod: MA

## 2023-12-16 PROCEDURE — 36415 COLL VENOUS BLD VENIPUNCTURE: CPT

## 2023-12-16 PROCEDURE — 93010 ELECTROCARDIOGRAM REPORT: CPT

## 2023-12-16 PROCEDURE — 84484 ASSAY OF TROPONIN QUANT: CPT

## 2023-12-16 PROCEDURE — 80053 COMPREHEN METABOLIC PANEL: CPT

## 2023-12-16 PROCEDURE — 99285 EMERGENCY DEPT VISIT HI MDM: CPT | Mod: 25

## 2023-12-16 PROCEDURE — 99285 EMERGENCY DEPT VISIT HI MDM: CPT

## 2023-12-16 PROCEDURE — 70450 CT HEAD/BRAIN W/O DYE: CPT | Mod: 26,MA

## 2023-12-16 PROCEDURE — 85025 COMPLETE CBC W/AUTO DIFF WBC: CPT

## 2023-12-16 PROCEDURE — 71045 X-RAY EXAM CHEST 1 VIEW: CPT | Mod: 26

## 2023-12-16 PROCEDURE — 96374 THER/PROPH/DIAG INJ IV PUSH: CPT

## 2023-12-16 PROCEDURE — 93005 ELECTROCARDIOGRAM TRACING: CPT

## 2023-12-16 PROCEDURE — 71045 X-RAY EXAM CHEST 1 VIEW: CPT

## 2023-12-16 PROCEDURE — 72125 CT NECK SPINE W/O DYE: CPT | Mod: MA

## 2023-12-16 PROCEDURE — 96375 TX/PRO/DX INJ NEW DRUG ADDON: CPT

## 2023-12-16 RX ORDER — ACETAMINOPHEN 500 MG
1000 TABLET ORAL ONCE
Refills: 0 | Status: COMPLETED | OUTPATIENT
Start: 2023-12-16 | End: 2023-12-16

## 2023-12-16 RX ORDER — SODIUM CHLORIDE 9 MG/ML
1000 INJECTION INTRAMUSCULAR; INTRAVENOUS; SUBCUTANEOUS ONCE
Refills: 0 | Status: COMPLETED | OUTPATIENT
Start: 2023-12-16 | End: 2023-12-16

## 2023-12-16 RX ADMIN — SODIUM CHLORIDE 1000 MILLILITER(S): 9 INJECTION INTRAMUSCULAR; INTRAVENOUS; SUBCUTANEOUS at 14:44

## 2023-12-16 RX ADMIN — Medication 400 MILLIGRAM(S): at 14:42

## 2023-12-16 RX ADMIN — Medication 0.5 MILLIGRAM(S): at 14:41
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
## 2023-12-16 NOTE — ED PROVIDER NOTE - CLINICAL SUMMARY MEDICAL DECISION MAKING FREE TEXT BOX
CT head and neck, labs, EKG, reeval. plan for CT head and neck, labs, EKG, cardiac monitor, Will give IV tylenol for pain. Obs and ReEval
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
## 2023-12-16 NOTE — ED PROVIDER NOTE - PHYSICAL EXAMINATION
Constitutional: NAD AOx3  Eyes: PERRL EOMI  Head: Normocephalic atraumatic  Mouth: MMM  Cardiac: regular rate and rhythm  Resp: Lungs CTAB  GI: Abd s/nd/nt  Neuro: CN2-12 grossly intact, MCKENNA x 4  Skin: No visible rashes  MSK: mild left cervical paraspinal ttp Constitutional: thin, anxious, AOx3 but short term memory problems, can't remember how she got to ED or who called 911  Eyes: PERRL EOMI  Head: Normocephalic atraumatic  Mouth: MMM  Cardiac: regular rate and rhythm  Resp: Lungs CTAB  GI: Abd s/nd/nt  Neuro: CN2-12 grossly intact, MCKENNA x 4  Skin: No visible rashes  MSK: mild left cervical paraspinal ttp, no midline ttp, pelvis nttp, no rib ttp

## 2023-12-16 NOTE — ED ADULT NURSE NOTE - NSFALLHARMRISKINTERV_ED_ALL_ED
Communicate risk of Fall with Harm to all staff, patient, and family/Provide visual cue: red socks, yellow wristband, yellow gown, etc/Reinforce activity limits and safety measures with patient and family/Bed in lowest position, wheels locked, appropriate side rails in place/Call bell, personal items and telephone in reach/Instruct patient to call for assistance before getting out of bed/chair/stretcher/Non-slip footwear applied when patient is off stretcher/[place] to call system/Physically safe environment - no spills, clutter or unnecessary equipment/Purposeful Proactive Rounding/Room/bathroom lighting operational, light cord in reach Communicate risk of Fall with Harm to all staff, patient, and family/Provide visual cue: red socks, yellow wristband, yellow gown, etc/Reinforce activity limits and safety measures with patient and family/Bed in lowest position, wheels locked, appropriate side rails in place/Call bell, personal items and telephone in reach/Instruct patient to call for assistance before getting out of bed/chair/stretcher/Non-slip footwear applied when patient is off stretcher/[place] to call system/Physically safe environment - no spills, clutter or unnecessary equipment/Purposeful Proactive Rounding/Room/bathroom lighting operational, light cord in reach

## 2023-12-16 NOTE — ED PROVIDER NOTE - PATIENT PORTAL LINK FT
You can access the FollowMyHealth Patient Portal offered by [facility] by registering at the following website: [email or web address]. By joining [employer]’s FollowMyHealth portal, you will also be able to view your health information using other applications (apps) compatible with our system. You can access the FollowMyHealth Patient Portal offered by [facility] by registering at the following website: [email or web address]. By joining [employer]’s FollowMyHealth portal, you will also be able to view your health information using other applications (apps) compatible with our system.

## 2023-12-16 NOTE — ED ADULT TRIAGE NOTE - NS ED NURSE AMBULANCES
Additional report called to [address], RN on 3rd floor. [facility]

## 2023-12-16 NOTE — ED PROVIDER NOTE - PROGRESS NOTE DETAILS
pt feeling much better. work up in ED without acute findings. pt to call daughter for ride home. MD RENETTA

## 2024-01-09 ENCOUNTER — NON-APPOINTMENT (OUTPATIENT)
Age: 79
End: 2024-01-09

## 2024-01-09 ENCOUNTER — APPOINTMENT (OUTPATIENT)
Dept: ELECTROPHYSIOLOGY | Facility: CLINIC | Age: 79
End: 2024-01-09
Payer: MEDICARE

## 2024-01-10 PROCEDURE — 93298 REM INTERROG DEV EVAL SCRMS: CPT

## 2024-02-09 ENCOUNTER — NON-APPOINTMENT (OUTPATIENT)
Age: 79
End: 2024-02-09

## 2024-02-09 ENCOUNTER — APPOINTMENT (OUTPATIENT)
Dept: ELECTROPHYSIOLOGY | Facility: CLINIC | Age: 79
End: 2024-02-09
Payer: MEDICARE

## 2024-02-10 PROCEDURE — 93298 REM INTERROG DEV EVAL SCRMS: CPT

## 2024-03-08 ENCOUNTER — INPATIENT (INPATIENT)
Facility: HOSPITAL | Age: 79
LOS: 4 days | Discharge: NURSING, REHAB OR LONG-TERM CARE | DRG: 312 | End: 2024-03-13
Attending: INTERNAL MEDICINE | Admitting: STUDENT IN AN ORGANIZED HEALTH CARE EDUCATION/TRAINING PROGRAM
Payer: MEDICARE

## 2024-03-08 VITALS
SYSTOLIC BLOOD PRESSURE: 117 MMHG | OXYGEN SATURATION: 100 % | DIASTOLIC BLOOD PRESSURE: 92 MMHG | HEART RATE: 63 BPM | HEIGHT: 61 IN | WEIGHT: 100.97 LBS | TEMPERATURE: 98 F | RESPIRATION RATE: 15 BRPM

## 2024-03-08 DIAGNOSIS — Z98.890 OTHER SPECIFIED POSTPROCEDURAL STATES: Chronic | ICD-10-CM

## 2024-03-08 DIAGNOSIS — R55 SYNCOPE AND COLLAPSE: ICD-10-CM

## 2024-03-08 LAB
ALBUMIN SERPL ELPH-MCNC: 3.8 G/DL — SIGNIFICANT CHANGE UP (ref 3.3–5)
ALP SERPL-CCNC: 59 U/L — SIGNIFICANT CHANGE UP (ref 40–120)
ALT FLD-CCNC: 17 U/L — SIGNIFICANT CHANGE UP (ref 12–78)
ANION GAP SERPL CALC-SCNC: 6 MMOL/L — SIGNIFICANT CHANGE UP (ref 5–17)
APPEARANCE UR: CLEAR — SIGNIFICANT CHANGE UP
AST SERPL-CCNC: 17 U/L — SIGNIFICANT CHANGE UP (ref 15–37)
BACTERIA # UR AUTO: NEGATIVE /HPF — SIGNIFICANT CHANGE UP
BASOPHILS # BLD AUTO: 0.05 K/UL — SIGNIFICANT CHANGE UP (ref 0–0.2)
BASOPHILS NFR BLD AUTO: 0.9 % — SIGNIFICANT CHANGE UP (ref 0–2)
BILIRUB SERPL-MCNC: 1 MG/DL — SIGNIFICANT CHANGE UP (ref 0.2–1.2)
BILIRUB UR-MCNC: NEGATIVE — SIGNIFICANT CHANGE UP
BUN SERPL-MCNC: 18 MG/DL — SIGNIFICANT CHANGE UP (ref 7–23)
CALCIUM SERPL-MCNC: 8.9 MG/DL — SIGNIFICANT CHANGE UP (ref 8.5–10.1)
CAST: 1 /LPF — SIGNIFICANT CHANGE UP (ref 0–4)
CHLORIDE SERPL-SCNC: 113 MMOL/L — HIGH (ref 96–108)
CK SERPL-CCNC: 119 U/L — SIGNIFICANT CHANGE UP (ref 26–192)
CO2 SERPL-SCNC: 24 MMOL/L — SIGNIFICANT CHANGE UP (ref 22–31)
COLOR SPEC: YELLOW — SIGNIFICANT CHANGE UP
CREAT SERPL-MCNC: 0.8 MG/DL — SIGNIFICANT CHANGE UP (ref 0.5–1.3)
DIFF PNL FLD: ABNORMAL
EGFR: 76 ML/MIN/1.73M2 — SIGNIFICANT CHANGE UP
EOSINOPHIL # BLD AUTO: 0.03 K/UL — SIGNIFICANT CHANGE UP (ref 0–0.5)
EOSINOPHIL NFR BLD AUTO: 0.5 % — SIGNIFICANT CHANGE UP (ref 0–6)
FLUAV AG NPH QL: SIGNIFICANT CHANGE UP
FLUBV AG NPH QL: SIGNIFICANT CHANGE UP
GLUCOSE SERPL-MCNC: 103 MG/DL — HIGH (ref 70–99)
GLUCOSE UR QL: NEGATIVE MG/DL — SIGNIFICANT CHANGE UP
HCT VFR BLD CALC: 38.1 % — SIGNIFICANT CHANGE UP (ref 34.5–45)
HGB BLD-MCNC: 13.2 G/DL — SIGNIFICANT CHANGE UP (ref 11.5–15.5)
IMM GRANULOCYTES NFR BLD AUTO: 0.2 % — SIGNIFICANT CHANGE UP (ref 0–0.9)
KETONES UR-MCNC: 15 MG/DL
LEUKOCYTE ESTERASE UR-ACNC: NEGATIVE — SIGNIFICANT CHANGE UP
LYMPHOCYTES # BLD AUTO: 1.56 K/UL — SIGNIFICANT CHANGE UP (ref 1–3.3)
LYMPHOCYTES # BLD AUTO: 26.5 % — SIGNIFICANT CHANGE UP (ref 13–44)
MCHC RBC-ENTMCNC: 31.9 PG — SIGNIFICANT CHANGE UP (ref 27–34)
MCHC RBC-ENTMCNC: 34.6 GM/DL — SIGNIFICANT CHANGE UP (ref 32–36)
MCV RBC AUTO: 92 FL — SIGNIFICANT CHANGE UP (ref 80–100)
MONOCYTES # BLD AUTO: 0.57 K/UL — SIGNIFICANT CHANGE UP (ref 0–0.9)
MONOCYTES NFR BLD AUTO: 9.7 % — SIGNIFICANT CHANGE UP (ref 2–14)
NEUTROPHILS # BLD AUTO: 3.66 K/UL — SIGNIFICANT CHANGE UP (ref 1.8–7.4)
NEUTROPHILS NFR BLD AUTO: 62.2 % — SIGNIFICANT CHANGE UP (ref 43–77)
NITRITE UR-MCNC: NEGATIVE — SIGNIFICANT CHANGE UP
PH UR: 5.5 — SIGNIFICANT CHANGE UP (ref 5–8)
PLATELET # BLD AUTO: 283 K/UL — SIGNIFICANT CHANGE UP (ref 150–400)
POTASSIUM SERPL-MCNC: 3.6 MMOL/L — SIGNIFICANT CHANGE UP (ref 3.5–5.3)
POTASSIUM SERPL-SCNC: 3.6 MMOL/L — SIGNIFICANT CHANGE UP (ref 3.5–5.3)
PROT SERPL-MCNC: 6.8 GM/DL — SIGNIFICANT CHANGE UP (ref 6–8.3)
PROT UR-MCNC: 30 MG/DL
RBC # BLD: 4.14 M/UL — SIGNIFICANT CHANGE UP (ref 3.8–5.2)
RBC # FLD: 13.2 % — SIGNIFICANT CHANGE UP (ref 10.3–14.5)
RBC CASTS # UR COMP ASSIST: 2 /HPF — SIGNIFICANT CHANGE UP (ref 0–4)
RSV RNA NPH QL NAA+NON-PROBE: SIGNIFICANT CHANGE UP
SARS-COV-2 RNA SPEC QL NAA+PROBE: SIGNIFICANT CHANGE UP
SODIUM SERPL-SCNC: 143 MMOL/L — SIGNIFICANT CHANGE UP (ref 135–145)
SP GR SPEC: 1.03 — SIGNIFICANT CHANGE UP (ref 1–1.03)
SQUAMOUS # UR AUTO: 2 /HPF — SIGNIFICANT CHANGE UP (ref 0–5)
TROPONIN I, HIGH SENSITIVITY RESULT: 4.52 NG/L — SIGNIFICANT CHANGE UP
UROBILINOGEN FLD QL: 1 MG/DL — SIGNIFICANT CHANGE UP (ref 0.2–1)
WBC # BLD: 5.88 K/UL — SIGNIFICANT CHANGE UP (ref 3.8–10.5)
WBC # FLD AUTO: 5.88 K/UL — SIGNIFICANT CHANGE UP (ref 3.8–10.5)
WBC UR QL: 5 /HPF — SIGNIFICANT CHANGE UP (ref 0–5)

## 2024-03-08 PROCEDURE — 95700 EEG CONT REC W/VID EEG TECH: CPT

## 2024-03-08 PROCEDURE — 85027 COMPLETE CBC AUTOMATED: CPT

## 2024-03-08 PROCEDURE — 72170 X-RAY EXAM OF PELVIS: CPT | Mod: 26

## 2024-03-08 PROCEDURE — 80048 BASIC METABOLIC PNL TOTAL CA: CPT

## 2024-03-08 PROCEDURE — 80061 LIPID PANEL: CPT

## 2024-03-08 PROCEDURE — 85652 RBC SED RATE AUTOMATED: CPT

## 2024-03-08 PROCEDURE — 97163 PT EVAL HIGH COMPLEX 45 MIN: CPT | Mod: GP

## 2024-03-08 PROCEDURE — 93880 EXTRACRANIAL BILAT STUDY: CPT

## 2024-03-08 PROCEDURE — 97116 GAIT TRAINING THERAPY: CPT | Mod: GP

## 2024-03-08 PROCEDURE — 95711 VEEG 2-12 HR UNMONITORED: CPT

## 2024-03-08 PROCEDURE — 81001 URINALYSIS AUTO W/SCOPE: CPT

## 2024-03-08 PROCEDURE — 72125 CT NECK SPINE W/O DYE: CPT | Mod: 26,MC

## 2024-03-08 PROCEDURE — 99285 EMERGENCY DEPT VISIT HI MDM: CPT

## 2024-03-08 PROCEDURE — 93010 ELECTROCARDIOGRAM REPORT: CPT

## 2024-03-08 PROCEDURE — 93306 TTE W/DOPPLER COMPLETE: CPT

## 2024-03-08 PROCEDURE — 36415 COLL VENOUS BLD VENIPUNCTURE: CPT

## 2024-03-08 PROCEDURE — 71045 X-RAY EXAM CHEST 1 VIEW: CPT | Mod: 26

## 2024-03-08 PROCEDURE — 97530 THERAPEUTIC ACTIVITIES: CPT | Mod: GP

## 2024-03-08 PROCEDURE — 87635 SARS-COV-2 COVID-19 AMP PRB: CPT

## 2024-03-08 PROCEDURE — 70450 CT HEAD/BRAIN W/O DYE: CPT | Mod: 26,MC

## 2024-03-08 PROCEDURE — 82746 ASSAY OF FOLIC ACID SERUM: CPT

## 2024-03-08 PROCEDURE — 84252 ASSAY OF VITAMIN B-2: CPT

## 2024-03-08 PROCEDURE — 70551 MRI BRAIN STEM W/O DYE: CPT | Mod: MC

## 2024-03-08 RX ORDER — ONDANSETRON 8 MG/1
4 TABLET, FILM COATED ORAL ONCE
Refills: 0 | Status: COMPLETED | OUTPATIENT
Start: 2024-03-08 | End: 2024-03-08

## 2024-03-08 RX ORDER — DONEPEZIL HYDROCHLORIDE 10 MG/1
1 TABLET, FILM COATED ORAL
Refills: 0 | DISCHARGE

## 2024-03-08 RX ORDER — ACETAMINOPHEN 500 MG
675 TABLET ORAL ONCE
Refills: 0 | Status: COMPLETED | OUTPATIENT
Start: 2024-03-08 | End: 2024-03-08

## 2024-03-08 RX ORDER — MORPHINE SULFATE 50 MG/1
4 CAPSULE, EXTENDED RELEASE ORAL ONCE
Refills: 0 | Status: DISCONTINUED | OUTPATIENT
Start: 2024-03-08 | End: 2024-03-08

## 2024-03-08 RX ORDER — SERTRALINE 25 MG/1
1 TABLET, FILM COATED ORAL
Refills: 0 | DISCHARGE

## 2024-03-08 RX ORDER — LEVOTHYROXINE SODIUM 125 MCG
1 TABLET ORAL
Qty: 0 | Refills: 0 | DISCHARGE

## 2024-03-08 RX ORDER — ONDANSETRON 8 MG/1
4 TABLET, FILM COATED ORAL EVERY 6 HOURS
Refills: 0 | Status: DISCONTINUED | OUTPATIENT
Start: 2024-03-08 | End: 2024-03-13

## 2024-03-08 RX ORDER — ATORVASTATIN CALCIUM 80 MG/1
2 TABLET, FILM COATED ORAL
Refills: 0 | DISCHARGE

## 2024-03-08 RX ORDER — ACETAMINOPHEN 500 MG
650 TABLET ORAL EVERY 6 HOURS
Refills: 0 | Status: DISCONTINUED | OUTPATIENT
Start: 2024-03-08 | End: 2024-03-13

## 2024-03-08 RX ADMIN — MORPHINE SULFATE 4 MILLIGRAM(S): 50 CAPSULE, EXTENDED RELEASE ORAL at 18:32

## 2024-03-08 RX ADMIN — ONDANSETRON 4 MILLIGRAM(S): 8 TABLET, FILM COATED ORAL at 19:40

## 2024-03-08 RX ADMIN — Medication 675 MILLIGRAM(S): at 17:36

## 2024-03-08 RX ADMIN — Medication 270 MILLIGRAM(S): at 16:23

## 2024-03-08 RX ADMIN — ONDANSETRON 4 MILLIGRAM(S): 8 TABLET, FILM COATED ORAL at 16:23

## 2024-03-08 NOTE — ED ADULT TRIAGE NOTE - CHIEF COMPLAINT QUOTE
patient brought in by EMS from home c/o headache.  patient endorsing left sided headache radiating down left side of neck and shoulder since yesterday. patient also endorsing possible syncopal episode while sitting at kitchen table.  patient initially reported the syncope yesterday but then said it was earlier today.  patient is A&Ox3 in triage. ekg in progress.

## 2024-03-08 NOTE — PATIENT PROFILE ADULT - FALL HARM RISK - FALL HARM RISK
No e/o cellulitis/nec fasc. No e/o PAD. Low suspicion by chronicity for DVT and duplex _________. No e/o bleeding from area. Other No e/o cellulitis/nec fasc, and pt with chronic swelling/lymphedema to that area by report and on EMR review. No e/o PAD. Low suspicion by chronicity for DVT and duplex _________. No e/o bleeding from area. Pt with leukocytosis however improving from her baseline leukocytosis on EMR review, and no fever or other infectious signs. Mild possible cellulitis, no e/o nec fasc, and pt with chronic swelling/lymphedema to that area by report and on EMR review. No e/o PAD. Low suspicion by chronicity for DVT and duplex _________. No e/o bleeding from area. Pt with leukocytosis however improving from her baseline leukocytosis on EMR review, and no fever or signs of sepsis. Mild possible cellulitis, no e/o nec fasc, and pt with chronic swelling/lymphedema to that area by report and on EMR review. No e/o PAD. Low suspicion by chronicity for DVT and duplex _________. No e/o bleeding from area. No rectal bleeding by report and on exam though +hemoccult. Pt with leukocytosis however improving from her baseline leukocytosis on EMR review, and no fever or signs of sepsis. Mild possible cellulitis, no e/o nec fasc, and pt with chronic swelling/lymphedema to that area by report and on EMR review. No e/o PAD. Low suspicion by chronicity for DVT and duplex negative. No e/o bleeding from area. No rectal bleeding by report and on exam though +hemoccult. Pt with leukocytosis however improving from her baseline leukocytosis on EMR review, and no fever or signs of sepsis. No e/o pulmonary fluid overload. COVID+ without symptoms. Patient well appearing, afebrile, hemodynamically stable. Admitted to internal medicine for further monitoring, w/u, and care.

## 2024-03-08 NOTE — ED PROVIDER NOTE - CLINICAL SUMMARY MEDICAL DECISION MAKING FREE TEXT BOX
pt with syncope and fall onto left side with ha and neck pain. will do cts, cardiac labs, ua, cardiac monitor, obs and reEval .

## 2024-03-08 NOTE — ED PROVIDER NOTE - PHYSICAL EXAMINATION
Constitutional: thin, NAD AOx3  Eyes: PERRL EOMI  Head: Normocephalic atraumatic  Mouth: MMM  C-spine nttp midline, mild left cervical paraspinal ttp   Cardiac: regular rate and rhythm  Resp: Lungs CTAB  GI: Abd s/nd/nt  Neuro: CN2-12 grossly intact, MCKENNA x 4  Skin: No visible rashes  Pelvis NTTP, stable

## 2024-03-08 NOTE — ED PROVIDER NOTE - OBJECTIVE STATEMENT
79 yo F [facility] with PMHx of medtronic loop recorder, palpitations, HTN, HLD, hypothyroid not on any OACs, with c/o left sided headache and neck pain since fall earlier today.  Pt was sitting at the table in the kitchen and then was on the floor, landed on  her left side. +LOC. States she "passes out sometimes." Does not recall calling EMS. Lives alone. No n/v. No cp or abd pain.  No fevers/ chills/ cough.  No urine sxs. pt not sure how long she was on floor or when the fall happened, today or yesterday. 77 yo F [facility] with PMHx of medtronic loop recorder, palpitations, HTN, HLD, hypothyroid not on any OACs, with c/o left sided headache and neck pain since fall earlier today.  Pt was sitting at the table in the kitchen and then was on the floor, landed on  her left side. +LOC. States she "passes out sometimes." Does not recall calling EMS. Lives alone. No n/v. No cp or abd pain.  No fevers/ chills/ cough.  No urine sxs. pt not sure how long she was on floor or when the fall happened, today or yesterday. Denies tobacco and EtOH

## 2024-03-08 NOTE — ED ADULT NURSE NOTE - NSFALLUNIVINTERV_ED_ALL_ED
Bed/Stretcher in lowest position, wheels locked, appropriate side rails in place/Call bell, personal items and telephone in reach/Instruct patient to call for assistance before getting out of bed/chair/stretcher/Non-slip footwear applied when patient is off stretcher/[place] to call system/Physically safe environment - no spills, clutter or unnecessary equipment/Purposeful proactive rounding/Room/bathroom lighting operational, light cord in reach

## 2024-03-08 NOTE — ED ADULT NURSE REASSESSMENT NOTE - NS ED NURSE REASSESS COMMENT FT1
pt received from previous RN Shaye. pt contact made. pt is resting comfortably in stretcher, aware of poc. pt is a&o x3 but states she is confused. "I am going senile" she states. pt remains on heart monitor. pt c/o nausea from the morphine, states this always happens when she has morphine. MD aware; zofran to be given. pt has no further complaints.

## 2024-03-08 NOTE — ED ADULT NURSE NOTE - OBJECTIVE STATEMENT
pt presenting w/vague reports of "possibly falling off my stool in the kitchen," "I don't remember," no obvious injuries noted, pt states "my head, neck and back hurt, so maybe I did fall?" pt does not recall incident or events after, when asked who called for an ambulance, pt unsure, when asked if she lives alone, pt states "this happens sometimes when I get upset." pt well appearing, MCKENNA and neck/head w/o any issues

## 2024-03-08 NOTE — PATIENT PROFILE ADULT - FALL HARM RISK - HARM RISK INTERVENTIONS
Assistance with ambulation/Assistance OOB with selected safe patient handling equipment/Communicate Risk of Fall with Harm to all staff/Monitor gait and stability/Reinforce activity limits and safety measures with patient and family/Sit up slowly, dangle for a short time, stand at bedside before walking/Tailored Fall Risk Interventions/Visual Cue: Yellow wristband and red socks/Bed in lowest position, wheels locked, appropriate side rails in place/Call bell, personal items and telephone in reach/Instruct patient to call for assistance before getting out of bed or chair/Non-slip footwear when patient is out of bed/[place] to call system/Physically safe environment - no spills, clutter or unnecessary equipment/Purposeful Proactive Rounding/Room/bathroom lighting operational, light cord in reach

## 2024-03-09 PROCEDURE — 93285 PRGRMG DEV EVAL SCRMS IP: CPT | Mod: 26

## 2024-03-09 PROCEDURE — 12345: CPT | Mod: NC

## 2024-03-09 PROCEDURE — 99222 1ST HOSP IP/OBS MODERATE 55: CPT

## 2024-03-09 RX ORDER — IBUPROFEN 200 MG
600 TABLET ORAL EVERY 6 HOURS
Refills: 0 | Status: DISCONTINUED | OUTPATIENT
Start: 2024-03-09 | End: 2024-03-13

## 2024-03-09 RX ORDER — ALPRAZOLAM 0.25 MG
0.25 TABLET ORAL EVERY 12 HOURS
Refills: 0 | Status: DISCONTINUED | OUTPATIENT
Start: 2024-03-09 | End: 2024-03-13

## 2024-03-09 RX ORDER — SERTRALINE 25 MG/1
25 TABLET, FILM COATED ORAL DAILY
Refills: 0 | Status: DISCONTINUED | OUTPATIENT
Start: 2024-03-09 | End: 2024-03-13

## 2024-03-09 RX ORDER — LEVOTHYROXINE SODIUM 125 MCG
50 TABLET ORAL DAILY
Refills: 0 | Status: DISCONTINUED | OUTPATIENT
Start: 2024-03-09 | End: 2024-03-13

## 2024-03-09 RX ORDER — ATORVASTATIN CALCIUM 80 MG/1
10 TABLET, FILM COATED ORAL AT BEDTIME
Refills: 0 | Status: DISCONTINUED | OUTPATIENT
Start: 2024-03-09 | End: 2024-03-11

## 2024-03-09 RX ORDER — LANOLIN ALCOHOL/MO/W.PET/CERES
3 CREAM (GRAM) TOPICAL AT BEDTIME
Refills: 0 | Status: DISCONTINUED | OUTPATIENT
Start: 2024-03-09 | End: 2024-03-13

## 2024-03-09 RX ORDER — DONEPEZIL HYDROCHLORIDE 10 MG/1
10 TABLET, FILM COATED ORAL AT BEDTIME
Refills: 0 | Status: DISCONTINUED | OUTPATIENT
Start: 2024-03-09 | End: 2024-03-13

## 2024-03-09 RX ADMIN — Medication 650 MILLIGRAM(S): at 03:30

## 2024-03-09 RX ADMIN — ATORVASTATIN CALCIUM 10 MILLIGRAM(S): 80 TABLET, FILM COATED ORAL at 21:10

## 2024-03-09 RX ADMIN — DONEPEZIL HYDROCHLORIDE 10 MILLIGRAM(S): 10 TABLET, FILM COATED ORAL at 21:10

## 2024-03-09 RX ADMIN — Medication 3 MILLIGRAM(S): at 21:10

## 2024-03-09 RX ADMIN — Medication 50 MICROGRAM(S): at 05:50

## 2024-03-09 RX ADMIN — Medication 650 MILLIGRAM(S): at 02:35

## 2024-03-09 RX ADMIN — Medication 650 MILLIGRAM(S): at 08:33

## 2024-03-09 RX ADMIN — Medication 650 MILLIGRAM(S): at 09:30

## 2024-03-09 RX ADMIN — ONDANSETRON 4 MILLIGRAM(S): 8 TABLET, FILM COATED ORAL at 15:17

## 2024-03-09 RX ADMIN — ONDANSETRON 4 MILLIGRAM(S): 8 TABLET, FILM COATED ORAL at 09:55

## 2024-03-09 RX ADMIN — SERTRALINE 25 MILLIGRAM(S): 25 TABLET, FILM COATED ORAL at 11:03

## 2024-03-09 RX ADMIN — Medication 0.25 MILLIGRAM(S): at 19:04

## 2024-03-09 RX ADMIN — Medication 650 MILLIGRAM(S): at 15:17

## 2024-03-09 NOTE — H&P ADULT - ASSESSMENT
A/P:    1.  Syncope  -Follow clinically in telemetry unit  -Follow orthostatic  -Follow echocardiogram  -Follow cardiology consult    2.  Fall  -keep on Fall precaution  -follow PT eval    3.  SCD for DVT ppx    4.  Code status  -Full code

## 2024-03-09 NOTE — H&P ADULT - NSHPPHYSICALEXAM_GEN_ALL_CORE
T(C): 36.2 (03-08-24 @ 20:13), Max: 36.8 (03-08-24 @ 19:52)  HR: 63 (03-08-24 @ 20:13) (60 - 63)  BP: 122/76 (03-08-24 @ 20:13) (117/92 - 152/98)  RR: 18 (03-08-24 @ 20:13) (15 - 18)  SpO2: 99% (03-08-24 @ 20:13) (98% - 100%)    CONSTITUTIONAL: Well groomed, no apparent distress  EYES: PERRLA and symmetric, EOMI, No conjunctival or scleral injection, non-icteric  ENMT: Oral mucosa with moist membranes. no pharyngeal injection or exudates             NECK: Supple, symmetric and without tracheal deviation   RESP: No respiratory distress, no use of accessory muscles; CTA b/l, no WRR  CV: RRR, +S1S2, no MRG; no JVD; no peripheral edema  GI: Soft, NT, ND, no rebound, no guarding; no palpable masses;   LYMPH: No cervical LAD or tenderness;   MSK: Normal ROM without pain, normal muscle strength/tone  SKIN: No rashes or ulcers noted;   NEURO: CN II-XII intact; normal reflexes in upper and lower extremities, sensation intact in upper and lower extremities b/l to light touch   PSYCH: Appropriate insight/judgment; A+O x 3, mood and affect appropriate, recent/remote memory intact

## 2024-03-09 NOTE — H&P ADULT - HISTORY OF PRESENT ILLNESS
79 yo F BIBEMS with PMHx of medtronic loop recorder, palpitations, HTN, HLD, hypothyroid not on any OACs, with c/o left sided headache and neck pain since fall earlier today.  Patient was sitting at the table in the kitchen and then was on the floor, landed on  her left side. She had +LOC. States she "passes out sometimes." Does not recall calling EMS. Lives alone. No n/v. No chest pain or abd pain.  No fevers/ chills/ cough.  No urine symptoms. Patient was not sure how long she was on floor or when the fall happened, today or yesterday. Denies tobacco and EtOH.

## 2024-03-09 NOTE — PROCEDURE NOTE - ADDITIONAL PROCEDURE DETAILS
No Pauses, tachy, alena events detected    AF burden < 0.1% Last episode was Jan 26, 24-most events 2 min or less in duration ( full interrogation on chart)  No events coinciding with recent syncopal event

## 2024-03-10 LAB
ANION GAP SERPL CALC-SCNC: 10 MMOL/L — SIGNIFICANT CHANGE UP (ref 5–17)
BUN SERPL-MCNC: 25 MG/DL — HIGH (ref 7–23)
CALCIUM SERPL-MCNC: 9.4 MG/DL — SIGNIFICANT CHANGE UP (ref 8.5–10.1)
CHLORIDE SERPL-SCNC: 107 MMOL/L — SIGNIFICANT CHANGE UP (ref 96–108)
CO2 SERPL-SCNC: 25 MMOL/L — SIGNIFICANT CHANGE UP (ref 22–31)
CREAT SERPL-MCNC: 0.93 MG/DL — SIGNIFICANT CHANGE UP (ref 0.5–1.3)
EGFR: 64 ML/MIN/1.73M2 — SIGNIFICANT CHANGE UP
ERYTHROCYTE [SEDIMENTATION RATE] IN BLOOD: 5 MM/HR — SIGNIFICANT CHANGE UP (ref 0–20)
FOLATE SERPL-MCNC: 12.2 NG/ML — SIGNIFICANT CHANGE UP
GLUCOSE SERPL-MCNC: 113 MG/DL — HIGH (ref 70–99)
HCT VFR BLD CALC: 39.5 % — SIGNIFICANT CHANGE UP (ref 34.5–45)
HGB BLD-MCNC: 13.5 G/DL — SIGNIFICANT CHANGE UP (ref 11.5–15.5)
MCHC RBC-ENTMCNC: 31.9 PG — SIGNIFICANT CHANGE UP (ref 27–34)
MCHC RBC-ENTMCNC: 34.2 GM/DL — SIGNIFICANT CHANGE UP (ref 32–36)
MCV RBC AUTO: 93.4 FL — SIGNIFICANT CHANGE UP (ref 80–100)
PLATELET # BLD AUTO: 304 K/UL — SIGNIFICANT CHANGE UP (ref 150–400)
POTASSIUM SERPL-MCNC: 4.1 MMOL/L — SIGNIFICANT CHANGE UP (ref 3.5–5.3)
POTASSIUM SERPL-SCNC: 4.1 MMOL/L — SIGNIFICANT CHANGE UP (ref 3.5–5.3)
RBC # BLD: 4.23 M/UL — SIGNIFICANT CHANGE UP (ref 3.8–5.2)
RBC # FLD: 13.3 % — SIGNIFICANT CHANGE UP (ref 10.3–14.5)
SODIUM SERPL-SCNC: 142 MMOL/L — SIGNIFICANT CHANGE UP (ref 135–145)
WBC # BLD: 5.96 K/UL — SIGNIFICANT CHANGE UP (ref 3.8–10.5)
WBC # FLD AUTO: 5.96 K/UL — SIGNIFICANT CHANGE UP (ref 3.8–10.5)

## 2024-03-10 PROCEDURE — 70551 MRI BRAIN STEM W/O DYE: CPT | Mod: 26

## 2024-03-10 PROCEDURE — 99233 SBSQ HOSP IP/OBS HIGH 50: CPT

## 2024-03-10 PROCEDURE — 99223 1ST HOSP IP/OBS HIGH 75: CPT

## 2024-03-10 PROCEDURE — 93880 EXTRACRANIAL BILAT STUDY: CPT | Mod: 26

## 2024-03-10 RX ADMIN — Medication 650 MILLIGRAM(S): at 18:06

## 2024-03-10 RX ADMIN — Medication 0.25 MILLIGRAM(S): at 20:48

## 2024-03-10 RX ADMIN — DONEPEZIL HYDROCHLORIDE 10 MILLIGRAM(S): 10 TABLET, FILM COATED ORAL at 20:48

## 2024-03-10 RX ADMIN — ATORVASTATIN CALCIUM 10 MILLIGRAM(S): 80 TABLET, FILM COATED ORAL at 20:48

## 2024-03-10 RX ADMIN — SERTRALINE 25 MILLIGRAM(S): 25 TABLET, FILM COATED ORAL at 11:09

## 2024-03-10 RX ADMIN — Medication 0.25 MILLIGRAM(S): at 08:41

## 2024-03-10 RX ADMIN — Medication 50 MICROGRAM(S): at 06:12

## 2024-03-10 RX ADMIN — ONDANSETRON 4 MILLIGRAM(S): 8 TABLET, FILM COATED ORAL at 18:06

## 2024-03-10 NOTE — DIETITIAN INITIAL EVALUATION ADULT - PERTINENT MEDS FT
MEDICATIONS  (STANDING):  atorvastatin 10 milliGRAM(s) Oral at bedtime  donepezil 10 milliGRAM(s) Oral at bedtime  levothyroxine 50 MICROGram(s) Oral daily  sertraline 25 milliGRAM(s) Oral daily    MEDICATIONS  (PRN):  acetaminophen     Tablet .. 650 milliGRAM(s) Oral every 6 hours PRN Mild Pain (1 - 3)  ALPRAZolam 0.25 milliGRAM(s) Oral every 12 hours PRN anxiety  aluminum hydroxide/magnesium hydroxide/simethicone Suspension 30 milliLiter(s) Oral every 4 hours PRN Dyspepsia  ibuprofen  Tablet. 600 milliGRAM(s) Oral every 6 hours PRN Moderate Pain (4 - 6)  melatonin 3 milliGRAM(s) Oral at bedtime PRN Insomnia  ondansetron Injectable 4 milliGRAM(s) IV Push every 6 hours PRN Nausea and/or Vomiting

## 2024-03-10 NOTE — DIETITIAN INITIAL EVALUATION ADULT - NAME AND PHONE
Marnie Chavez RDN, CDN, [facility]      [phone]   [email or web address]

## 2024-03-10 NOTE — DIETITIAN INITIAL EVALUATION ADULT - PERTINENT LABORATORY DATA
03-08    143  |  113<H>  |  18  ----------------------------<  103<H>  3.6   |  24  |  0.80    Ca    8.9      08 Mar 2024 16:05    TPro  6.8  /  Alb  3.8  /  TBili  1.0  /  DBili  x   /  AST  17  /  ALT  17  /  AlkPhos  59  03-08

## 2024-03-10 NOTE — CONSULT NOTE ADULT - SUBJECTIVE AND OBJECTIVE BOX
Neurology Consult requested by:   Patient is a 78y old  Female who presents with a chief complaint of Syncope (10 Mar 2024 13:13)     HPI:  77 yo woman BIBEMS with PMHx of medtronic loop recorder, palpitations, HTN, HLD, hypothyroid not on any OACs, with c/o left sided headache and neck pain since fall earlier today.  Patient was sitting at the table in the kitchen and then was on the floor, landed on  her left side. She had brief LOC. States she "passes out sometimes." Does not recall calling EMS. Lives alone. No n/v. No chest pain or abd pain.  No fevers/ chills/ cough.  No urine symptoms. Patient was not sure how long she was on floor or when the fall happened, today or yesterday. Denies tobacco and EtOH.    has had similar episodes of LOC, usually triggered by stress. started happening as a young woman.      PAST MEDICAL & SURGICAL HISTORY:  HTN (hypertension)      Hypothyroid      H/O cardiac radiofrequency ablation        FAMILY HISTORY:  No pertinent family history in first degree relatives      Social Hx:  Nonsmoker, no drug or alcohol use  Medications and Allergies ReviewedMEDICATIONS  (STANDING):  atorvastatin 10 milliGRAM(s) Oral at bedtime  donepezil 10 milliGRAM(s) Oral at bedtime  levothyroxine 50 MICROGram(s) Oral daily  sertraline 25 milliGRAM(s) Oral daily     ROS: Pertinent positives in HPI, all other ROS were reviewed and are negative.      Examination:   Vital Signs Last 24 Hrs  T(C): 37.1 (10 Mar 2024 08:45), Max: 37.1 (10 Mar 2024 08:45)  T(F): 98.8 (10 Mar 2024 08:45), Max: 98.8 (10 Mar 2024 08:45)  HR: 63 (10 Mar 2024 08:45) (57 - 74)  BP: 111/72 (10 Mar 2024 08:45) (111/72 - 137/65)  BP(mean): 92 (09 Mar 2024 23:29) (92 - 92)  RR: 18 (10 Mar 2024 08:45) (18 - 18)  SpO2: 97% (10 Mar 2024 08:45) (97% - 97%)    Orthostatic VS    03-09-24 @ 05:34  Lying BP: Orthostatic BP (Lying Systolic): 122/Orthostatic BP (Lying Diastolic (mm Hg)): 74 HR: Orthostatic Pulse (Heart Rate (beats/min)): 69   Sitting BP: Orthostatic BP (Sitting Systolic): 126/Orthostatic BP (Sitting Diastolic (mm Hg)): 86 HR: Orthostatic Pulse (Heart Rate (beats/min)): 67  Standing BP: Orthostatic BP (Standing Systolic): 116/Orthostatic BP (Standing Diastolic (mm Hg)): 94 HR: Orthostatic Pulse (Heart Rate (beats/min)): 80      Parameters below as of 10 Mar 2024 08:45  Patient On (Oxygen Delivery Method): room air      General: Cooperative, NAD   NECK: supple, no masses  ENT: Normal hearing   Vascular : no carotid bruits,   Lungs: CTAB  Chest: RRR, no murmurs  Extremities: nontender, no edema  Musculoskeletal: no adventitious movements, no joint stiffness  Skin: no rash    Neurological Examination:  NIHSS:0  MS: AOx3. Appropriately interactive, normal affect. Speech fluent w/o paraphasic error, repetition, naming is intact   CN: VFFTC, PERLL, EOMI, V1-3 sensation intact, face symmetric, hearing intact, palate elevates symmetrically, tongue midline, SCM equal bilaterally  Motor: normal bulk and tone, no tremor, rigidity or bradykinesia.  5/5 all over   Sens: Intact to light touch.    Reflexes: 0-1/4 all over, downgoing toes b/l  Coord:  No dysmetria, RADHA intact   Gait: Cannot test    Labs: Reviewed  Complete Blood Count + Automated Diff (03.08.24 @ 16:05)   WBC Count: 5.88 K/uL  RBC Count: 4.14 M/uL  Hemoglobin: 13.2 g/dL  Hematocrit: 38.1 %  Mean Cell Volume: 92.0 fl  Mean Cell Hemoglobin: 31.9 pg  Mean Cell Hemoglobin Conc: 34.6 gm/dL  Red Cell Distrib Width: 13.2 %  Platelet Count - Automated: 283 K/uL      Comprehensive Metabolic Panel (03.08.24 @ 16:05)   Sodium: 143 mmol/L  Potassium: 3.6 mmol/L  Chloride: 113 mmol/L  Carbon Dioxide: 24 mmol/L  Anion Gap: 6 mmol/L  Blood Urea Nitrogen: 18 mg/dL  Creatinine: 0.80 mg/dL  Glucose: 103 mg/dL  Calcium: 8.9 mg/dL  Protein Total: 6.8 gm/dL  Albumin: 3.8 g/dL  Bilirubin Total: 1.0 mg/dL  Alkaline Phosphatase: 59 U/L  Aspartate Aminotransferase (AST/SGOT): 17 U/L  Alanine Aminotransferase (ALT/SGPT): 17 U/L  eGFR: 76:    < from: MR Head No Cont (03.10.24 @ 09:47) >  INTERPRETATION:  .    CLINICAL INFORMATION: Evaluate for cerebrovascular accident.    TECHNIQUE: Multiplanar multisequential MRI of the brain was acquired   without the administration of IV gadolinium.    COMPARISON: Prior CT study of the head from 3/8/2024.    FINDINGS: Multiple small rounded nonspecific foci of T2/FLAIR   hyperintensity are noted throughout the deep and periventricular white   matter of the cerebral hemispheres. There is no associated mass effect.   There is no evidence of acute ischemia on the diffusion-weighted images.    There is diffuse cerebral volume loss with prominence of the sulci,   fissures, and cisternal spaces which is normal for the patient's age.   Ventricular size and configuration is unremarkable. Flow-voids are noted   throughout the major intracranial vessels, on the T2 weighted images,   consistent with their patency. The sellar region and posterior fossa   appear unremarkable.    The paranasal sinuses and tympanomastoid cavities are clear. Calvarial   signal is within normal limits. There is evidence of bilateral cataract   removal.    IMPRESSION: No acute intracranial hemorrhage or evidence of acute   ischemia.    Multiple small rounded nonspecific abnormal white matter foci of T2/FLAIR   prolongation statistically favoring microvascular type changes.    --- End of Report ---    < end of copied text >                Imaging: Reviewed    A/P:    No IV tpa given because…  Total Critical Care Time spent:  
Patient is a 78y old  Female who presents with a chief complaint of Syncope       HPI:  77 yo F BIBEMS with PMHx of medtronic loop recorder, palpitations, HTN, HLD, hypothyroid not on any OACs, with c/o left sided headache and neck pain since fall earlier today.  Patient was sitting at the table in the kitchen and then was on the floor, landed on  her left side. She had +LOC. States she "passes out sometimes." Does not recall calling EMS. Lives alone. No n/v. No chest pain or abd pain.      3/9- Mrs Arce  was seen and examined by me this morning.    She seems to be comfortable in bed.    She still complains of slight headache.          PAST MEDICAL & SURGICAL HISTORY:  HTN (hypertension)      Hypothyroid      H/O cardiac radiofrequency ablation          MEDICATIONS  (STANDING):  atorvastatin 10 milliGRAM(s) Oral at bedtime  donepezil 10 milliGRAM(s) Oral at bedtime  levothyroxine 50 MICROGram(s) Oral daily  sertraline 25 milliGRAM(s) Oral daily    MEDICATIONS  (PRN):  acetaminophen     Tablet .. 650 milliGRAM(s) Oral every 6 hours PRN Mild Pain (1 - 3)  aluminum hydroxide/magnesium hydroxide/simethicone Suspension 30 milliLiter(s) Oral every 4 hours PRN Dyspepsia  melatonin 3 milliGRAM(s) Oral at bedtime PRN Insomnia  ondansetron Injectable 4 milliGRAM(s) IV Push every 6 hours PRN Nausea and/or Vomiting      FAMILY HISTORY:  No pertinent family history in first degree relatives        SOCIAL HISTORY:No recent smoking    REVIEW OF SYSTEMS:  CONSTITUTIONAL:    No fatigue, malaise, lethargy.  No fever or chills.  RESPIRATORY:  No cough.  No wheeze.  No hemoptysis.    CARDIOVASCULAR:  No chest pains.  No palpitations. No shortness of breath, No orthopnea or PND.  GASTROINTESTINAL:  No abdominal pain.  No nausea or vomiting.    GENITOURINARY:    No hematuria.    MUSCULOSKELETAL:  No musculoskeletal pain.  No joint swelling.  No arthritis.  NEUROLOGICAL:  No tingling or numbness or weakness. complains of headache  PSYCHIATRIC:  No confusion  SKIN:  No rashes.            Vital Signs Last 24 Hrs  T(C): 36.2 (08 Mar 2024 20:13), Max: 36.8 (08 Mar 2024 19:52)  T(F): 97.2 (08 Mar 2024 20:13), Max: 98.3 (08 Mar 2024 19:52)  HR: 63 (08 Mar 2024 20:13) (60 - 63)  BP: 122/76 (08 Mar 2024 20:13) (117/92 - 152/98)  BP(mean): --  RR: 18 (08 Mar 2024 20:13) (15 - 18)  SpO2: 99% (08 Mar 2024 20:13) (98% - 100%)    Parameters below as of 08 Mar 2024 20:13  Patient On (Oxygen Delivery Method): room air        PHYSICAL EXAM-    Constitutional:  no acute distress     Head: Head is normocephalic and atraumatic.      Neck:  No JVD.     Cardiovascular: Regular rate and rhythm without S3, S4. No murmurs or rubs are appreciated.      Respiratory: Breathsounds are normal. No rales. No wheezing.    Abdomen: Soft, nontender, nondistended with positive bowel sounds.      Extremity: No tenderness. No  pitting edema     Neurologic: The patient is alert and oriented.      Skin: No rash, no obvious lesions noted.      Psychiatric: The patient appears to be emotionally stable.      INTERPRETATION OF TELEMETRY: sinus rhythm    ECG: Sinus rythm ,  no ST T changes.   MS    I&O's Detail      LABS:                        13.2   5.88  )-----------( 283      ( 08 Mar 2024 16:05 )             38.1     03-08    143  |  113<H>  |  18  ----------------------------<  103<H>  3.6   |  24  |  0.80    Ca    8.9      08 Mar 2024 16:05    TPro  6.8  /  Alb  3.8  /  TBili  1.0  /  DBili  x   /  AST  17  /  ALT  17  /  AlkPhos  59  03-08    CARDIAC MARKERS ( 08 Mar 2024 16:05 )  x     / x     / 119 U/L / x     / x            Urinalysis Basic - ( 08 Mar 2024 22:08 )    Color: Yellow / Appearance: Clear / S.028 / pH: x  Gluc: x / Ketone: 15 mg/dL  / Bili: Negative / Urobili: 1.0 mg/dL   Blood: x / Protein: 30 mg/dL / Nitrite: Negative   Leuk Esterase: Negative / RBC: 2 /HPF / WBC 5 /HPF   Sq Epi: x / Non Sq Epi: 2 /HPF / Bacteria: Negative /HPF      I&O's Summary    BNP  RADIOLOGY & ADDITIONAL STUDIES:  < from: Xray Chest 1 View- PORTABLE-Urgent (24 @ 16:12) >  INTERPRETATION:  AP chest on 2024 at 3:53 PM. Patient had a fall.    Heart possibly enlarged. Left loop recorder again noted. COPD   hyperexpansion of the lungs again seen. No fracture. No change from   2023.    Pelvis.    COMPARISON: None available.    Hips a rather freeof degeneration and symmetric. No fracture.    IMPRESSION: No acute finding. COPD, possible heart enlargement, and loop   recorder again noted.    --- End of Report ---    < end of copied text >  < from: TTE Echo Complete w/o Contrast w/ Doppler (21 @ 10:43) >   The left ventricle is normal in size, wall thickness, wall motion and   contractility.   Estimated left ventricular ejection fraction is 65-70 %.   Fibrocalcific changes noted to the mitral valve leaflets with preserved   leaflet excursion.   Trace mitral regurgitation is present.   Fibrocalcific changes noted to the Aortic valve leaflets with preserved   leaflet excursion.   Trace aortic regurgitation is present.   The tricuspid valve leaflets are thin and pliable; valve motion is normal.   Moderate (2+) tricuspid valve regurgitation is present.   Mild pulmonic valvular regurgitation (1+) is present.     Signature     ----------------------------------------------------------------   Electronically signed by Juan Carlos Mascorro MD(Interpreting    < end of copied text >

## 2024-03-10 NOTE — CONSULT NOTE ADULT - ASSESSMENT
syncope   No events on telemetry.    Orthostatic blood pressure readings negative for her orthostatic hypotension.    Recommend loop recorder interrogation   She had prior episodes of syncope in the past.    Echocardiogram pending   She appears euvolemic on physical exam     Hyperlipidemia   Continue atorvastatin     Hypothyroidism  She is on levothyroxine     All the imaging studies and labs reviewed.      Other medical issues- Management per primary team.   Thank you for allowing me to participate in the care of this patient. Please feel free to contact me with any questions.     
78 yr old woman hx of syncopal episode, similar events in the past, not orthostatic, loop recorder negative for arrythmia during episode. ? seizures.  Suggest:  continue tele monitor  24 video EEG

## 2024-03-10 NOTE — DIETITIAN INITIAL EVALUATION ADULT - OTHER INFO
79 yo F BIBEMS with PMHx of medtronic loop recorder, palpitations, HTN, HLD, hypothyroid not on any OACs, with c/o left sided headache and neck pain since fall earlier today.  Patient was sitting at the table in the kitchen and then was on the floor, landed on  her left side. She had +LOC. States she "passes out sometimes." Does not recall calling EMS. Lives alone. No n/v. No chest pain or abd pain.  No fevers/ chills/ cough.  No urine symptoms. Patient was not sure how long she was on floor or when the fall happened, today or yesterday. Denies tobacco and EtOH. 79 yo F BIBEMS with PMHx of medtronic loop recorder, palpitations, HTN, HLD, hypothyroid not on any OACs, with c/o left sided headache and neck pain since fall earlier today.  Patient was sitting at the table in the kitchen and then was on the floor, landed on  her left side. She had +LOC. States she "passes out sometimes." Does not recall calling EMS. Lives alone. No n/v. No chest pain or abd pain.  No fevers/ chills/ cough.  No urine symptoms. Patient was not sure how long she was on floor or when the fall happened, today or yesterday. Denies tobacco and EtOH.    Admit dx is syncope and collapse  RD bedscale weight is 45 kg  99#  Previous wt  5/28/2021  was also 45 kg  99#  NFPE reveals  muscle wasting, fat wasting   PO intake estimated < 75% ENN > one month   Wt has been stable  PO intake at  is fair  No issues with N/V  No issues chew/swallow  suggest Confirm Goals of Care regarding nutrition support   Recommendations to follow in Plan/Intervention

## 2024-03-10 NOTE — DIETITIAN INITIAL EVALUATION ADULT - ADD RECOMMEND
Liberalize diet to Regular to optimize po/nutrient intake.   MVI w/ minerals daily to ensure 100% RDA met   Pt declines supplements  Record PO intake in EMR after each meal (nursing.)   Consider adding thiamine 100 mg daily 2/2 poor PO intake/ malnutrition  Monitor bowel movements, if no BM for >3 days, consider implementing bowel regimen.  Encourage PO intake  Monitor PO intake, tolerance, labs and weight.

## 2024-03-11 LAB
CHOLEST SERPL-MCNC: 227 MG/DL — HIGH
HDLC SERPL-MCNC: 67 MG/DL — SIGNIFICANT CHANGE UP
LIPID PNL WITH DIRECT LDL SERPL: 142 MG/DL — HIGH
NON HDL CHOLESTEROL: 160 MG/DL — HIGH
TRIGL SERPL-MCNC: 101 MG/DL — SIGNIFICANT CHANGE UP

## 2024-03-11 PROCEDURE — 99233 SBSQ HOSP IP/OBS HIGH 50: CPT

## 2024-03-11 PROCEDURE — 93306 TTE W/DOPPLER COMPLETE: CPT | Mod: 26

## 2024-03-11 PROCEDURE — 95718 EEG PHYS/QHP 2-12 HR W/VEEG: CPT

## 2024-03-11 RX ORDER — ATORVASTATIN CALCIUM 80 MG/1
20 TABLET, FILM COATED ORAL AT BEDTIME
Refills: 0 | Status: DISCONTINUED | OUTPATIENT
Start: 2024-03-11 | End: 2024-03-13

## 2024-03-11 RX ORDER — SUMATRIPTAN SUCCINATE 4 MG/.5ML
50 INJECTION, SOLUTION SUBCUTANEOUS ONCE
Refills: 0 | Status: COMPLETED | OUTPATIENT
Start: 2024-03-11 | End: 2024-03-11

## 2024-03-11 RX ADMIN — SUMATRIPTAN SUCCINATE 50 MILLIGRAM(S): 4 INJECTION, SOLUTION SUBCUTANEOUS at 14:28

## 2024-03-11 RX ADMIN — Medication 650 MILLIGRAM(S): at 13:05

## 2024-03-11 RX ADMIN — Medication 650 MILLIGRAM(S): at 11:53

## 2024-03-11 RX ADMIN — Medication 3 MILLIGRAM(S): at 22:55

## 2024-03-11 RX ADMIN — SUMATRIPTAN SUCCINATE 50 MILLIGRAM(S): 4 INJECTION, SOLUTION SUBCUTANEOUS at 16:30

## 2024-03-11 RX ADMIN — SERTRALINE 25 MILLIGRAM(S): 25 TABLET, FILM COATED ORAL at 09:36

## 2024-03-11 RX ADMIN — Medication 50 MICROGRAM(S): at 06:19

## 2024-03-11 RX ADMIN — ATORVASTATIN CALCIUM 20 MILLIGRAM(S): 80 TABLET, FILM COATED ORAL at 22:55

## 2024-03-11 RX ADMIN — Medication 600 MILLIGRAM(S): at 22:56

## 2024-03-11 RX ADMIN — DONEPEZIL HYDROCHLORIDE 10 MILLIGRAM(S): 10 TABLET, FILM COATED ORAL at 22:55

## 2024-03-11 NOTE — PROVIDER CONTACT NOTE (OTHER) - SITUATION
Pt started pulling off her leads approximatle 1830 very emotional by 1945 all her leads were off except for 2 or 3

## 2024-03-11 NOTE — PROVIDER CONTACT NOTE (OTHER) - BACKGROUND
medtronic loop recorder, palpitations, HTN, HLD, hypothyroid not on any OACs, with c/o left sided headache and neck pain since fall earlier

## 2024-03-11 NOTE — PHYSICAL THERAPY INITIAL EVALUATION ADULT - PERTINENT HX OF CURRENT PROBLEM, REHAB EVAL
77 yo woman BIBEMS with PMHx of medtronic loop recorder, palpitations, HTN, HLD, hypothyroid not on any OACs, with c/o left sided headache and neck pain since fall earlier today.  Patient was sitting at the table in the kitchen and then was on the floor, landed on  her left side. She had brief LOC. States she "passes out sometimes." Does not recall calling EMS. MRI head (-)

## 2024-03-12 ENCOUNTER — TRANSCRIPTION ENCOUNTER (OUTPATIENT)
Age: 79
End: 2024-03-12

## 2024-03-12 PROCEDURE — 99233 SBSQ HOSP IP/OBS HIGH 50: CPT

## 2024-03-12 PROCEDURE — 99232 SBSQ HOSP IP/OBS MODERATE 35: CPT

## 2024-03-12 RX ORDER — MECLIZINE HCL 12.5 MG
12.5 TABLET ORAL EVERY 8 HOURS
Refills: 0 | Status: DISCONTINUED | OUTPATIENT
Start: 2024-03-12 | End: 2024-03-13

## 2024-03-12 RX ADMIN — DONEPEZIL HYDROCHLORIDE 10 MILLIGRAM(S): 10 TABLET, FILM COATED ORAL at 21:57

## 2024-03-12 RX ADMIN — Medication 0.25 MILLIGRAM(S): at 21:58

## 2024-03-12 RX ADMIN — SERTRALINE 25 MILLIGRAM(S): 25 TABLET, FILM COATED ORAL at 09:18

## 2024-03-12 RX ADMIN — Medication 12.5 MILLIGRAM(S): at 21:58

## 2024-03-12 RX ADMIN — Medication 600 MILLIGRAM(S): at 21:58

## 2024-03-12 RX ADMIN — ATORVASTATIN CALCIUM 20 MILLIGRAM(S): 80 TABLET, FILM COATED ORAL at 21:56

## 2024-03-12 RX ADMIN — Medication 12.5 MILLIGRAM(S): at 17:54

## 2024-03-12 RX ADMIN — Medication 50 MICROGRAM(S): at 06:08

## 2024-03-12 NOTE — PROGRESS NOTE ADULT - NUTRITIONAL ASSESSMENT
This patient has been assessed with a concern for Malnutrition and has been determined to have a diagnosis/diagnoses of Severe protein-calorie malnutrition.    This patient is being managed with:   Diet DASH/TLC-  Sodium & Cholesterol Restricted  Entered: Mar  8 2024  5:29PM  

## 2024-03-12 NOTE — DISCHARGE NOTE NURSING/CASE MANAGEMENT/SOCIAL WORK - PATIENT PORTAL LINK FT
You can access the FollowMyHealth Patient Portal offered by [facility] by registering at the following website: [email or web address]. By joining [employer]’s FollowMyHealth portal, you will also be able to view your health information using other applications (apps) compatible with our system.

## 2024-03-12 NOTE — PROGRESS NOTE ADULT - SUBJECTIVE AND OBJECTIVE BOX
3/9: c/o headaches  ILR interrogation showed Parox A fib; burden less than 0.1%  no AC per cardio  MRI brain    3/10: feeling a little better today  MRI head neg for CVA      PHYSICAL EXAM:    Vital Signs Last 24 Hrs  T(C): 37.1 (10 Mar 2024 08:45), Max: 37.1 (10 Mar 2024 08:45)  T(F): 98.8 (10 Mar 2024 08:45), Max: 98.8 (10 Mar 2024 08:45)  HR: 63 (10 Mar 2024 08:45) (57 - 74)  BP: 111/72 (10 Mar 2024 08:45) (111/72 - 137/65)  BP(mean): 92 (09 Mar 2024 23:29) (92 - 92)  RR: 18 (10 Mar 2024 08:45) (18 - 18)  SpO2: 97% (10 Mar 2024 08:45) (97% - 97%)    Parameters below as of 10 Mar 2024 08:45  Patient On (Oxygen Delivery Method): room air        Constitutional: Weak and ill appearing  HEENT: Atraumatic, JEREMIAS,   Respiratory: Breath Sounds normal, no rhonchi/wheeze  Cardiovascular: N S1S2;   Gastrointestinal: Abdomen soft, non tender, Bowel Sounds present  Extremities: No edema, peripheral pulses present  Neurological: AAO x 3, no gross focal motor deficits  Skin: Non cellulitic, no rash, ulcers  Lymph Nodes: No lymphadenopathy noted  Back: No CVA tenderness   Musculoskeletal: non tender  Breasts: Deferred  Genitourinary: deferred  Rectal: Deferred    All Labs/EKG/Radiology/Meds reviewed by me                          13.2   5.88  )-----------( 283      ( 08 Mar 2024 16:05 )             38.1       CBC Full  -  ( 08 Mar 2024 16:05 )  WBC Count : 5.88 K/uL  RBC Count : 4.14 M/uL  Hemoglobin : 13.2 g/dL  Hematocrit : 38.1 %  Platelet Count - Automated : 283 K/uL  Mean Cell Volume : 92.0 fl  Mean Cell Hemoglobin : 31.9 pg  Mean Cell Hemoglobin Concentration : 34.6 gm/dL  Auto Neutrophil # : 3.66 K/uL  Auto Lymphocyte # : 1.56 K/uL  Auto Monocyte # : 0.57 K/uL  Auto Eosinophil # : 0.03 K/uL  Auto Basophil # : 0.05 K/uL  Auto Neutrophil % : 62.2 %  Auto Lymphocyte % : 26.5 %  Auto Monocyte % : 9.7 %  Auto Eosinophil % : 0.5 %  Auto Basophil % : 0.9 %          143  |  113<H>  |  18  ----------------------------<  103<H>  3.6   |  24  |  0.80    Ca    8.9      08 Mar 2024 16:05    TPro  6.8  /  Alb  3.8  /  TBili  1.0  /  DBili  x   /  AST  17  /  ALT  17  /  AlkPhos  59  03-08      LIVER FUNCTIONS - ( 08 Mar 2024 16:05 )  Alb: 3.8 g/dL / Pro: 6.8 gm/dL / ALK PHOS: 59 U/L / ALT: 17 U/L / AST: 17 U/L / GGT: x                 CARDIAC MARKERS ( 08 Mar 2024 16:05 )  x     / x     / 119 U/L / x     / x            Urinalysis Basic - ( 08 Mar 2024 22:08 )    Color: Yellow / Appearance: Clear / S.028 / pH: x  Gluc: x / Ketone: 15 mg/dL  / Bili: Negative / Urobili: 1.0 mg/dL   Blood: x / Protein: 30 mg/dL / Nitrite: Negative   Leuk Esterase: Negative / RBC: 2 /HPF / WBC 5 /HPF   Sq Epi: x / Non Sq Epi: 2 /HPF / Bacteria: Negative /HPF      < from: MR Head No Cont (03.10.24 @ 09:47) >  IMPRESSION: No acute intracranial hemorrhage or evidence of acute   ischemia.    Multiple small rounded nonspecific abnormal white matter foci of T2/FLAIR   prolongation statistically favoring microvascular type changes.    < end of copied text >  < from: Xray Chest 1 View- PORTABLE-Urgent (24 @ 16:12) >  IMPRESSION: No acute finding. COPD, possible heart enlargement, and loop   recorder again noted.    < end of copied text >        MEDICATIONS  (STANDING):  atorvastatin 10 milliGRAM(s) Oral at bedtime  donepezil 10 milliGRAM(s) Oral at bedtime  levothyroxine 50 MICROGram(s) Oral daily  sertraline 25 milliGRAM(s) Oral daily    MEDICATIONS  (PRN):  acetaminophen     Tablet .. 650 milliGRAM(s) Oral every 6 hours PRN Mild Pain (1 - 3)  aluminum hydroxide/magnesium hydroxide/simethicone Suspension 30 milliLiter(s) Oral every 4 hours PRN Dyspepsia  ibuprofen  Tablet. 600 milliGRAM(s) Oral every 6 hours PRN Moderate Pain (4 - 6)  melatonin 3 milliGRAM(s) Oral at bedtime PRN Insomnia  ondansetron Injectable 4 milliGRAM(s) IV Push every 6 hours PRN Nausea and/or Vomiting        
Patient is a 78y old  Female who presents with a chief complaint of Syncope (10 Mar 2024 13:48)      HPI:  79 yo F BIBEMS with PMHx of medtronic loop recorder, palpitations, HTN, HLD, hypothyroid not on any OACs, with c/o left sided headache and neck pain since fall earlier today.  Patient was sitting at the table in the kitchen and then was on the floor, landed on  her left side. She had +LOC. States she "passes out sometimes." Does not recall calling EMS. Lives alone. No n/v. No chest pain or abd pain.  No fevers/ chills/ cough.  No urine symptoms. Patient was not sure how long she was on floor or when the fall happened, today or yesterday. Denies tobacco and EtOH.  (09 Mar 2024 03:52)    Cardiology  3/9- Mrs Arce  was seen and examined by me this morning.    She seems to be comfortable in bed.    She still complains of slight headache.      3/10- Mrs Arce Was seen and examined by me this morning.    She still complains of mild headache but much better.    She complains of Neck pain.    3/11 alert, no complaints     PAST MEDICAL & SURGICAL HISTORY:  HTN (hypertension)      Hypothyroid      H/O cardiac radiofrequency ablation            MEDICATIONS  (STANDING):  atorvastatin 10 milliGRAM(s) Oral at bedtime  donepezil 10 milliGRAM(s) Oral at bedtime  levothyroxine 50 MICROGram(s) Oral daily  sertraline 25 milliGRAM(s) Oral daily    MEDICATIONS  (PRN):  acetaminophen     Tablet .. 650 milliGRAM(s) Oral every 6 hours PRN Mild Pain (1 - 3)  ALPRAZolam 0.25 milliGRAM(s) Oral every 12 hours PRN anxiety  aluminum hydroxide/magnesium hydroxide/simethicone Suspension 30 milliLiter(s) Oral every 4 hours PRN Dyspepsia  ibuprofen  Tablet. 600 milliGRAM(s) Oral every 6 hours PRN Moderate Pain (4 - 6)  melatonin 3 milliGRAM(s) Oral at bedtime PRN Insomnia  ondansetron Injectable 4 milliGRAM(s) IV Push every 6 hours PRN Nausea and/or Vomiting          Vital Signs Last 24 Hrs  T(C): 36.6 (11 Mar 2024 08:12), Max: 37.1 (10 Mar 2024 08:45)  T(F): 97.9 (11 Mar 2024 08:12), Max: 98.8 (10 Mar 2024 08:45)  HR: 65 (11 Mar 2024 08:12) (59 - 80)  BP: 118/77 (11 Mar 2024 08:12) (103/76 - 118/77)  BP(mean): 85 (11 Mar 2024 08:12) (78 - 85)  RR: 18 (11 Mar 2024 08:12) (18 - 18)  SpO2: 97% (11 Mar 2024 08:12) (95% - 99%)    Parameters below as of 11 Mar 2024 08:12  Patient On (Oxygen Delivery Method): room air        I&O's Summary      PHYSICAL EXAM  General Appearance: NAD  HEENT:   Neck:   Back:   Lungs: clr  Heart: [id number]  Abdomen:   Extremities: no edema  Skin:   Neurologic:       INTERPRETATION OF TELEMETRY:    ECG:        LABS:                          13.5   5.96  )-----------( 304      ( 10 Mar 2024 13:40 )             39.5     03-10    142  |  107  |  25<H>  ----------------------------<  113<H>  4.1   |  25  |  0.93    Ca    9.4      10 Mar 2024 13:40                Urinalysis Basic - ( 10 Mar 2024 13:40 )    Color: x / Appearance: x / SG: x / pH: x  Gluc: 113 mg/dL / Ketone: x  / Bili: x / Urobili: x   Blood: x / Protein: x / Nitrite: x   Leuk Esterase: x / RBC: x / WBC x   Sq Epi: x / Non Sq Epi: x / Bacteria: x            RADIOLOGY & ADDITIONAL STUDIES:    
3/9: c/o headaches  ILR interrogation showed Parox A fib; burden less than 0.1%  no AC per cardio  MRI brain      PHYSICAL EXAM:    Daily     Daily     Vital Signs Last 24 Hrs  T(C): 36.6 (09 Mar 2024 16:17), Max: 36.9 (09 Mar 2024 08:21)  T(F): 97.9 (09 Mar 2024 16:17), Max: 98.4 (09 Mar 2024 08:21)  HR: 74 (09 Mar 2024 16:17) (60 - 74)  BP: 137/65 (09 Mar 2024 16:17) (102/65 - 152/98)  BP(mean): --  RR: 18 (09 Mar 2024 16:17) (18 - 18)  SpO2: 97% (09 Mar 2024 16:17) (97% - 99%)    Constitutional: Weak and ill appearing  HEENT: Atraumatic, JEREMIAS,   Respiratory: Breath Sounds normal, no rhonchi/wheeze  Cardiovascular: N S1S2;   Gastrointestinal: Abdomen soft, non tender, Bowel Sounds present  Extremities: No edema, peripheral pulses present  Neurological: AAO x 3, no gross focal motor deficits  Skin: Non cellulitic, no rash, ulcers  Lymph Nodes: No lymphadenopathy noted  Back: No CVA tenderness   Musculoskeletal: non tender  Breasts: Deferred  Genitourinary: deferred  Rectal: Deferred    All Labs/EKG/Radiology/Meds reviewed by me                          13.2   5.88  )-----------( 283      ( 08 Mar 2024 16:05 )             38.1       CBC Full  -  ( 08 Mar 2024 16:05 )  WBC Count : 5.88 K/uL  RBC Count : 4.14 M/uL  Hemoglobin : 13.2 g/dL  Hematocrit : 38.1 %  Platelet Count - Automated : 283 K/uL  Mean Cell Volume : 92.0 fl  Mean Cell Hemoglobin : 31.9 pg  Mean Cell Hemoglobin Concentration : 34.6 gm/dL  Auto Neutrophil # : 3.66 K/uL  Auto Lymphocyte # : 1.56 K/uL  Auto Monocyte # : 0.57 K/uL  Auto Eosinophil # : 0.03 K/uL  Auto Basophil # : 0.05 K/uL  Auto Neutrophil % : 62.2 %  Auto Lymphocyte % : 26.5 %  Auto Monocyte % : 9.7 %  Auto Eosinophil % : 0.5 %  Auto Basophil % : 0.9 %          143  |  113<H>  |  18  ----------------------------<  103<H>  3.6   |  24  |  0.80    Ca    8.9      08 Mar 2024 16:05    TPro  6.8  /  Alb  3.8  /  TBili  1.0  /  DBili  x   /  AST  17  /  ALT  17  /  AlkPhos  59  03-08      LIVER FUNCTIONS - ( 08 Mar 2024 16:05 )  Alb: 3.8 g/dL / Pro: 6.8 gm/dL / ALK PHOS: 59 U/L / ALT: 17 U/L / AST: 17 U/L / GGT: x                 CARDIAC MARKERS ( 08 Mar 2024 16:05 )  x     / x     / 119 U/L / x     / x            Urinalysis Basic - ( 08 Mar 2024 22:08 )    Color: Yellow / Appearance: Clear / S.028 / pH: x  Gluc: x / Ketone: 15 mg/dL  / Bili: Negative / Urobili: 1.0 mg/dL   Blood: x / Protein: 30 mg/dL / Nitrite: Negative   Leuk Esterase: Negative / RBC: 2 /HPF / WBC 5 /HPF   Sq Epi: x / Non Sq Epi: 2 /HPF / Bacteria: Negative /HPF            MEDICATIONS  (STANDING):  atorvastatin 10 milliGRAM(s) Oral at bedtime  donepezil 10 milliGRAM(s) Oral at bedtime  levothyroxine 50 MICROGram(s) Oral daily  sertraline 25 milliGRAM(s) Oral daily    MEDICATIONS  (PRN):  acetaminophen     Tablet .. 650 milliGRAM(s) Oral every 6 hours PRN Mild Pain (1 - 3)  aluminum hydroxide/magnesium hydroxide/simethicone Suspension 30 milliLiter(s) Oral every 4 hours PRN Dyspepsia  ibuprofen  Tablet. 600 milliGRAM(s) Oral every 6 hours PRN Moderate Pain (4 - 6)  melatonin 3 milliGRAM(s) Oral at bedtime PRN Insomnia  ondansetron Injectable 4 milliGRAM(s) IV Push every 6 hours PRN Nausea and/or Vomiting        
3/9: c/o headaches  ILR interrogation showed Parox A fib; burden less than 0.1%  no AC per cardio  MRI brain    3/10: feeling a little better today  MRI head neg for CVA    3/11: c/o headache  says has hx of migraines  24 hr video EEG in progress  carotid doppler neg    PHYSICAL EXAM:    Vital Signs Last 24 Hrs  T(C): 36.6 (11 Mar 2024 08:12), Max: 36.9 (10 Mar 2024 15:59)  T(F): 97.9 (11 Mar 2024 08:12), Max: 98.5 (10 Mar 2024 15:59)  HR: 65 (11 Mar 2024 08:12) (59 - 80)  BP: 118/77 (11 Mar 2024 08:12) (103/76 - 118/77)  BP(mean): 85 (11 Mar 2024 08:12) (78 - 85)  RR: 18 (11 Mar 2024 08:12) (18 - 18)  SpO2: 97% (11 Mar 2024 08:12) (95% - 99%)    Parameters below as of 11 Mar 2024 08:12  Patient On (Oxygen Delivery Method): room air    Constitutional: Weak  appearing  HEENT: Atraumatic, JEREMIAS,   Respiratory: Breath Sounds normal, no rhonchi/wheeze  Cardiovascular: N S1S2;   Gastrointestinal: Abdomen soft, non tender, Bowel Sounds present  Extremities: No edema, peripheral pulses present  Neurological: AAO x 3, no gross focal motor deficits  Skin: Non cellulitic, no rash, ulcers  Lymph Nodes: No lymphadenopathy noted  Back: No CVA tenderness   Musculoskeletal: non tender  Breasts: Deferred  Genitourinary: deferred  Rectal: Deferred    All Labs/EKG/Radiology/Meds reviewed by me                          13.2   5.88  )-----------( 283      ( 08 Mar 2024 16:05 )             38.1       CBC Full  -  ( 08 Mar 2024 16:05 )  WBC Count : 5.88 K/uL  RBC Count : 4.14 M/uL  Hemoglobin : 13.2 g/dL  Hematocrit : 38.1 %  Platelet Count - Automated : 283 K/uL  Mean Cell Volume : 92.0 fl  Mean Cell Hemoglobin : 31.9 pg  Mean Cell Hemoglobin Concentration : 34.6 gm/dL  Auto Neutrophil # : 3.66 K/uL  Auto Lymphocyte # : 1.56 K/uL  Auto Monocyte # : 0.57 K/uL  Auto Eosinophil # : 0.03 K/uL  Auto Basophil # : 0.05 K/uL  Auto Neutrophil % : 62.2 %  Auto Lymphocyte % : 26.5 %  Auto Monocyte % : 9.7 %  Auto Eosinophil % : 0.5 %  Auto Basophil % : 0.9 %      08    143  |  113<H>  |  18  ----------------------------<  103<H>  3.6   |  24  |  0.80    Ca    8.9      08 Mar 2024 16:05    TPro  6.8  /  Alb  3.8  /  TBili  1.0  /  DBili  x   /  AST  17  /  ALT  17  /  AlkPhos  59  03-08      LIVER FUNCTIONS - ( 08 Mar 2024 16:05 )  Alb: 3.8 g/dL / Pro: 6.8 gm/dL / ALK PHOS: 59 U/L / ALT: 17 U/L / AST: 17 U/L / GGT: x                 CARDIAC MARKERS ( 08 Mar 2024 16:05 )  x     / x     / 119 U/L / x     / x            Urinalysis Basic - ( 08 Mar 2024 22:08 )    Color: Yellow / Appearance: Clear / S.028 / pH: x  Gluc: x / Ketone: 15 mg/dL  / Bili: Negative / Urobili: 1.0 mg/dL   Blood: x / Protein: 30 mg/dL / Nitrite: Negative   Leuk Esterase: Negative / RBC: 2 /HPF / WBC 5 /HPF   Sq Epi: x / Non Sq Epi: 2 /HPF / Bacteria: Negative /HPF      < from: MR Head No Cont (03.10.24 @ 09:47) >  IMPRESSION: No acute intracranial hemorrhage or evidence of acute   ischemia.    Multiple small rounded nonspecific abnormal white matter foci of T2/FLAIR   prolongation statistically favoring microvascular type changes.    < end of copied text >  < from: Xray Chest 1 View- PORTABLE-Urgent (24 @ 16:12) >  IMPRESSION: No acute finding. COPD, possible heart enlargement, and loop   recorder again noted.    < end of copied text >    MEDICATIONS  (STANDING):  atorvastatin 20 milliGRAM(s) Oral at bedtime  donepezil 10 milliGRAM(s) Oral at bedtime  levothyroxine 50 MICROGram(s) Oral daily  sertraline 25 milliGRAM(s) Oral daily  SUMAtriptan 50 milliGRAM(s) Oral once    MEDICATIONS  (PRN):  acetaminophen     Tablet .. 650 milliGRAM(s) Oral every 6 hours PRN Mild Pain (1 - 3)  ALPRAZolam 0.25 milliGRAM(s) Oral every 12 hours PRN anxiety  aluminum hydroxide/magnesium hydroxide/simethicone Suspension 30 milliLiter(s) Oral every 4 hours PRN Dyspepsia  ibuprofen  Tablet. 600 milliGRAM(s) Oral every 6 hours PRN Moderate Pain (4 - 6)  melatonin 3 milliGRAM(s) Oral at bedtime PRN Insomnia  ondansetron Injectable 4 milliGRAM(s) IV Push every 6 hours PRN Nausea and/or Vomiting    
Patient is a 78y old  Female who presents with a chief complaint of Syncope       HPI:  79 yo F BIBEMS with PMHx of medtronic loop recorder, palpitations, HTN, HLD, hypothyroid not on any OACs, with c/o left sided headache and neck pain since fall earlier today.  Patient was sitting at the table in the kitchen and then was on the floor, landed on  her left side. She had +LOC. States she "passes out sometimes." Does not recall calling EMS. Lives alone. No n/v. No chest pain or abd pain.      3/9- Mrs Arce  was seen and examined by me this morning.    She seems to be comfortable in bed.    She still complains of slight headache.      3/10- Mrs Arce Was seen and examined by me this morning.    She still complains of mild headache but much better.    She complains of Neck pain.          PAST MEDICAL & SURGICAL HISTORY:  HTN (hypertension)      Hypothyroid      H/O cardiac radiofrequency ablation          MEDICATIONS  (STANDING):  atorvastatin 10 milliGRAM(s) Oral at bedtime  donepezil 10 milliGRAM(s) Oral at bedtime  levothyroxine 50 MICROGram(s) Oral daily  sertraline 25 milliGRAM(s) Oral daily    MEDICATIONS  (PRN):  acetaminophen     Tablet .. 650 milliGRAM(s) Oral every 6 hours PRN Mild Pain (1 - 3)  aluminum hydroxide/magnesium hydroxide/simethicone Suspension 30 milliLiter(s) Oral every 4 hours PRN Dyspepsia  melatonin 3 milliGRAM(s) Oral at bedtime PRN Insomnia  ondansetron Injectable 4 milliGRAM(s) IV Push every 6 hours PRN Nausea and/or Vomiting      FAMILY HISTORY:  No pertinent family history in first degree relatives        SOCIAL HISTORY:No recent smoking    REVIEW OF SYSTEMS:  CONSTITUTIONAL:    No fatigue, malaise, lethargy.  No fever or chills.  RESPIRATORY:  No cough.  No wheeze.  No hemoptysis.    CARDIOVASCULAR:  No chest pains.  No palpitations. No shortness of breath, No orthopnea or PND.  GASTROINTESTINAL:  No abdominal pain.  No nausea or vomiting.    GENITOURINARY:    No hematuria.    MUSCULOSKELETAL:  No musculoskeletal pain.  No joint swelling.  No arthritis.  NEUROLOGICAL:  No tingling or numbness or weakness. complains of headache  PSYCHIATRIC:  No confusion  SKIN:  No rashes.          ICU Vital Signs Last 24 Hrs  T(C): 36.6 (09 Mar 2024 23:29), Max: 36.9 (09 Mar 2024 08:21)  T(F): 97.9 (09 Mar 2024 23:29), Max: 98.4 (09 Mar 2024 08:21)  HR: 57 (09 Mar 2024 23:29) (57 - 74)  BP: 123/82 (09 Mar 2024 23:29) (102/65 - 137/65)  BP(mean): 92 (09 Mar 2024 23:29) (92 - 92)  ABP: --  ABP(mean): --  RR: 18 (09 Mar 2024 16:17) (18 - 18)  SpO2: 97% (09 Mar 2024 23:29) (97% - 98%)    O2 Parameters below as of 09 Mar 2024 16:17  Patient On (Oxygen Delivery Method): room air                PHYSICAL EXAM-    Constitutional:  no acute distress     Head: Head is normocephalic and atraumatic.      Neck:  No JVD.     Cardiovascular: Regular rate and rhythm without S3, S4. No murmurs or rubs are appreciated.      Respiratory: Breathsounds are normal. No rales. No wheezing.    Abdomen: Soft, nontender, nondistended with positive bowel sounds.      Extremity: No tenderness. No  pitting edema     Neurologic: The patient is alert and oriented.      Skin: No rash, no obvious lesions noted.      Psychiatric: The patient appears to be emotionally stable.      INTERPRETATION OF TELEMETRY: sinus rhythm    ECG: Sinus rythm ,  no ST T changes.   MS    I&O's Detail      LABS:                                              13.2   5.88  )-----------( 283      ( 08 Mar 2024 16:05 )             38.1     03-08    143  |  113<H>  |  18  ----------------------------<  103<H>  3.6   |  24  |  0.80    Ca    8.9      08 Mar 2024 16:05    TPro  6.8  /  Alb  3.8  /  TBili  1.0  /  DBili  x   /  AST  17  /  ALT  17  /  AlkPhos  59  03-08    CARDIAC MARKERS ( 08 Mar 2024 16:05 )  x     / x     / 119 U/L / x     / x          LIVER FUNCTIONS - ( 08 Mar 2024 16:05 )  Alb: 3.8 g/dL / Pro: 6.8 gm/dL / ALK PHOS: 59 U/L / ALT: 17 U/L / AST: 17 U/L / GGT: x                             Urinalysis Basic - ( 08 Mar 2024 22:08 )    Color: Yellow / Appearance: Clear / S.028 / pH: x  Gluc: x / Ketone: 15 mg/dL  / Bili: Negative / Urobili: 1.0 mg/dL   Blood: x / Protein: 30 mg/dL / Nitrite: Negative   Leuk Esterase: Negative / RBC: 2 /HPF / WBC 5 /HPF   Sq Epi: x / Non Sq Epi: 2 /HPF / Bacteria: Negative /HPF      I&O's Summary    BNP  RADIOLOGY & ADDITIONAL STUDIES:  < from: Xray Chest 1 View- PORTABLE-Urgent (24 @ 16:12) >  INTERPRETATION:  AP chest on 2024 at 3:53 PM. Patient had a fall.    Heart possibly enlarged. Left loop recorder again noted. COPD   hyperexpansion of the lungs again seen. No fracture. No change from   2023.    Pelvis.    COMPARISON: None available.    Hips a rather freeof degeneration and symmetric. No fracture.    IMPRESSION: No acute finding. COPD, possible heart enlargement, and loop   recorder again noted.    --- End of Report ---    < end of copied text >  < from: TTE Echo Complete w/o Contrast w/ Doppler (21 @ 10:43) >   The left ventricle is normal in size, wall thickness, wall motion and   contractility.   Estimated left ventricular ejection fraction is 65-70 %.   Fibrocalcific changes noted to the mitral valve leaflets with preserved   leaflet excursion.   Trace mitral regurgitation is present.   Fibrocalcific changes noted to the Aortic valve leaflets with preserved   leaflet excursion.   Trace aortic regurgitation is present.   The tricuspid valve leaflets are thin and pliable; valve motion is normal.   Moderate (2+) tricuspid valve regurgitation is present.   Mild pulmonic valvular regurgitation (1+) is present.     Signature     ----------------------------------------------------------------   Electronically signed by Juan Carlos Mascorro MD(Interpreting    < end of copied text >  
3/9: c/o headaches  ILR interrogation showed Parox A fib; burden less than 0.1%  no AC per cardio  MRI brain    3/10: feeling a little better today  MRI head neg for CVA    3/11: c/o headache  says has hx of migraines  24 hr video EEG in progress  carotid doppler neg    3/12: not feeling well  orthostatics  no Seizures on  video EEG    PHYSICAL EXAM:    Vital Signs Last 24 Hrs  T(C): 36.6 (11 Mar 2024 08:12), Max: 36.9 (10 Mar 2024 15:59)  T(F): 97.9 (11 Mar 2024 08:12), Max: 98.5 (10 Mar 2024 15:59)  HR: 65 (11 Mar 2024 08:12) (59 - 80)  BP: 118/77 (11 Mar 2024 08:12) (103/76 - 118/77)  BP(mean): 85 (11 Mar 2024 08:12) (78 - 85)  RR: 18 (11 Mar 2024 08:12) (18 - 18)  SpO2: 97% (11 Mar 2024 08:12) (95% - 99%)    Parameters below as of 11 Mar 2024 08:12  Patient On (Oxygen Delivery Method): room air    Constitutional: Weak  appearing  HEENT: Atraumatic, JEREMIAS,   Respiratory: Breath Sounds normal, no rhonchi/wheeze  Cardiovascular: N S1S2;   Gastrointestinal: Abdomen soft, non tender, Bowel Sounds present  Extremities: No edema, peripheral pulses present  Neurological: AAO x 3, no gross focal motor deficits  Skin: Non cellulitic, no rash, ulcers  Lymph Nodes: No lymphadenopathy noted  Back: No CVA tenderness   Musculoskeletal: non tender  Breasts: Deferred  Genitourinary: deferred  Rectal: Deferred    All Labs/EKG/Radiology/Meds reviewed by me                          13.2   5.88  )-----------( 283      ( 08 Mar 2024 16:05 )             38.1       CBC Full  -  ( 08 Mar 2024 16:05 )  WBC Count : 5.88 K/uL  RBC Count : 4.14 M/uL  Hemoglobin : 13.2 g/dL  Hematocrit : 38.1 %  Platelet Count - Automated : 283 K/uL  Mean Cell Volume : 92.0 fl  Mean Cell Hemoglobin : 31.9 pg  Mean Cell Hemoglobin Concentration : 34.6 gm/dL  Auto Neutrophil # : 3.66 K/uL  Auto Lymphocyte # : 1.56 K/uL  Auto Monocyte # : 0.57 K/uL  Auto Eosinophil # : 0.03 K/uL  Auto Basophil # : 0.05 K/uL  Auto Neutrophil % : 62.2 %  Auto Lymphocyte % : 26.5 %  Auto Monocyte % : 9.7 %  Auto Eosinophil % : 0.5 %  Auto Basophil % : 0.9 %      08    143  |  113<H>  |  18  ----------------------------<  103<H>  3.6   |  24  |  0.80    Ca    8.9      08 Mar 2024 16:05    TPro  6.8  /  Alb  3.8  /  TBili  1.0  /  DBili  x   /  AST  17  /  ALT  17  /  AlkPhos  59  03-08      LIVER FUNCTIONS - ( 08 Mar 2024 16:05 )  Alb: 3.8 g/dL / Pro: 6.8 gm/dL / ALK PHOS: 59 U/L / ALT: 17 U/L / AST: 17 U/L / GGT: x                 CARDIAC MARKERS ( 08 Mar 2024 16:05 )  x     / x     / 119 U/L / x     / x            Urinalysis Basic - ( 08 Mar 2024 22:08 )    Color: Yellow / Appearance: Clear / S.028 / pH: x  Gluc: x / Ketone: 15 mg/dL  / Bili: Negative / Urobili: 1.0 mg/dL   Blood: x / Protein: 30 mg/dL / Nitrite: Negative   Leuk Esterase: Negative / RBC: 2 /HPF / WBC 5 /HPF   Sq Epi: x / Non Sq Epi: 2 /HPF / Bacteria: Negative /HPF      < from: MR Head No Cont (03.10.24 @ 09:47) >  IMPRESSION: No acute intracranial hemorrhage or evidence of acute   ischemia.    Multiple small rounded nonspecific abnormal white matter foci of T2/FLAIR   prolongation statistically favoring microvascular type changes.    < end of copied text >  < from: Xray Chest 1 View- PORTABLE-Urgent (24 @ 16:12) >  IMPRESSION: No acute finding. COPD, possible heart enlargement, and loop   recorder again noted.    < end of copied text >    MEDICATIONS  (STANDING):  atorvastatin 20 milliGRAM(s) Oral at bedtime  donepezil 10 milliGRAM(s) Oral at bedtime  levothyroxine 50 MICROGram(s) Oral daily  sertraline 25 milliGRAM(s) Oral daily  SUMAtriptan 50 milliGRAM(s) Oral once    MEDICATIONS  (PRN):  acetaminophen     Tablet .. 650 milliGRAM(s) Oral every 6 hours PRN Mild Pain (1 - 3)  ALPRAZolam 0.25 milliGRAM(s) Oral every 12 hours PRN anxiety  aluminum hydroxide/magnesium hydroxide/simethicone Suspension 30 milliLiter(s) Oral every 4 hours PRN Dyspepsia  ibuprofen  Tablet. 600 milliGRAM(s) Oral every 6 hours PRN Moderate Pain (4 - 6)  melatonin 3 milliGRAM(s) Oral at bedtime PRN Insomnia  ondansetron Injectable 4 milliGRAM(s) IV Push every 6 hours PRN Nausea and/or Vomiting    
Patient is a 78y old  Female who presents with a chief complaint of Syncope (11 Mar 2024 14:08)    79 yo F BIBEMS with PMHx of medtronic loop recorder, palpitations, HTN, HLD, hypothyroid not on any OACs, with c/o left sided headache and neck pain since fall earlier today.  Patient was sitting at the table in the kitchen and then was on the floor, landed on  her left side. She had +LOC. States she "passes out sometimes." Does not recall calling EMS. Lives alone. No n/v. No chest pain or abd pain.  No fevers/ chills/ cough.  No urine symptoms. Patient was not sure how long she was on floor or when the fall happened, today or yesterday. Denies tobacco and EtOH.  (09 Mar 2024 03:52)    Cardiology  3/9- Mrs Arce  was seen and examined by me this morning.    She seems to be comfortable in bed.    She still complains of slight headache.      3/10- Mrs Arce Was seen and examined by me this morning.    She still complains of mild headache but much better.    She complains of Neck pain.    3/11 alert, no complaints    3/12/24  No orthostatic blood pressure readings on the chart.   Echo normal.   No abnormal rhythms on telemetry  She has no complaints this am.      CARDIAC STUDIES:< from: TTE Echo Complete w/o Contrast w/ Doppler (03.11.24 @ 15:29) >  The aortic valve appears mildly calcified. Valve opening seems to be   normal.   Trace aortic regurgitation is present.   Normal appearing left atrium.   The left ventricle is normal in size, wall thickness, wall motion and   contractility as seen in limited views.   Estimated left ventricular ejection fraction is 55-60 %.   Normal diastolic function.   The IVC appears normal.   Dilated coronary sinus measuring 2.9 x 2.6 cm.   Atherosclerotic plaque noted in the thoracic aorta.   Normal appearing mitral valve structure.   Trace mitralregurgitation is present.   No evidence of pericardial effusion.   No evidence of pleural effusion.   Normal appearing pulmonic valve structure.   Mild pulmonic valvular regurgitation (1+) is present.   Normal appearing right atrium.   Normal appearing right ventricle structure and function.   Normal appearing tricuspid valve structure.   Mild to Moderate Tricuspid regurgitation is present.    < end of copied text >      Allergies    codeine (Unknown)  morphine (Unknown)    Intolerances        MEDICATIONS  (STANDING):  atorvastatin 20 milliGRAM(s) Oral at bedtime  donepezil 10 milliGRAM(s) Oral at bedtime  levothyroxine 50 MICROGram(s) Oral daily  sertraline 25 milliGRAM(s) Oral daily    MEDICATIONS  (PRN):  acetaminophen     Tablet .. 650 milliGRAM(s) Oral every 6 hours PRN Mild Pain (1 - 3)  ALPRAZolam 0.25 milliGRAM(s) Oral every 12 hours PRN anxiety  aluminum hydroxide/magnesium hydroxide/simethicone Suspension 30 milliLiter(s) Oral every 4 hours PRN Dyspepsia  ibuprofen  Tablet. 600 milliGRAM(s) Oral every 6 hours PRN Moderate Pain (4 - 6)  melatonin 3 milliGRAM(s) Oral at bedtime PRN Insomnia  ondansetron Injectable 4 milliGRAM(s) IV Push every 6 hours PRN Nausea and/or Vomiting    REVIEW OF SYSTEMS:    RESPIRATORY: No cough, wheezing, hemoptysis; No shortness of breath  CARDIOVASCULAR: No chest pain or palpitations  All other review of systems is negative unless indicated above      PHYSICAL EXAM:  Daily     Daily   Vital Signs Last 24 Hrs  T(C): 36.5 (12 Mar 2024 08:16), Max: 37 (11 Mar 2024 16:00)  T(F): 97.7 (12 Mar 2024 08:16), Max: 98.6 (11 Mar 2024 16:00)  HR: 74 (12 Mar 2024 08:16) (60 - 74)  BP: 121/78 (12 Mar 2024 08:16) (121/78 - 134/82)  BP(mean): 91 (12 Mar 2024 08:16) (91 - 91)  RR: 18 (12 Mar 2024 08:16) (18 - 18)  SpO2: 99% (12 Mar 2024 08:16) (98% - 99%)    Parameters below as of 12 Mar 2024 08:16  Patient On (Oxygen Delivery Method): room air        Constitutional: NAD, awake and alert, well-developed  HEENT: PERR, EOMI, Normal Hearing, MMM  Neck: Soft and supple, No LAD, No JVD  Respiratory: Breath sounds are clear bilaterally, No wheezing, rales or rhonchi  Cardiovascular: S1 and S2, regular rate and rhythm, no Murmurs, gallops or rubs  Gastrointestinal: Bowel Sounds present, soft, nontender, nondistended, no guarding, no rebound  Extremities: No peripheral edema  Vascular: 2+ peripheral pulses  Neurological: A/O x 3, no focal deficits  Musculoskeletal: 5/5 strength b/l upper and lower extremities  Skin: No rashes    LABS: All Labs Reviewed:                        13.5   5.96  )-----------( 304      ( 10 Mar 2024 13:40 )             39.5     03-10    142  |  107  |  25<H>  ----------------------------<  113<H>  4.1   |  25  |  0.93    Ca    9.4      10 Mar 2024 13:40          TELEMETRY/EKG: NSR    
HPI:  77 yo woman F BIBEMS with PMHx of medtronic loop recorder, palpitations, HTN, HLD, hypothyroid not on any OACs, ADM after episode of LOC. None since admitted to [facility].      MEDICATIONS  (STANDING):  atorvastatin 20 milliGRAM(s) Oral at bedtime  donepezil 10 milliGRAM(s) Oral at bedtime  levothyroxine 50 MICROGram(s) Oral daily  sertraline 25 milliGRAM(s) Oral daily    MEDICATIONS  (PRN):  acetaminophen     Tablet .. 650 milliGRAM(s) Oral every 6 hours PRN Mild Pain (1 - 3)  ALPRAZolam 0.25 milliGRAM(s) Oral every 12 hours PRN anxiety  aluminum hydroxide/magnesium hydroxide/simethicone Suspension 30 milliLiter(s) Oral every 4 hours PRN Dyspepsia  ibuprofen  Tablet. 600 milliGRAM(s) Oral every 6 hours PRN Moderate Pain (4 - 6)  melatonin 3 milliGRAM(s) Oral at bedtime PRN Insomnia  ondansetron Injectable 4 milliGRAM(s) IV Push every 6 hours PRN Nausea and/or Vomiting      Vital Signs Last 24 Hrs  T(C): 36.5 (12 Mar 2024 08:16), Max: 37 (11 Mar 2024 16:00)  T(F): 97.7 (12 Mar 2024 08:16), Max: 98.6 (11 Mar 2024 16:00)  HR: 74 (12 Mar 2024 08:16) (60 - 74)  BP: 121/78 (12 Mar 2024 08:16) (121/78 - 134/82)  BP(mean): 91 (12 Mar 2024 08:16) (91 - 91)  RR: 18 (12 Mar 2024 08:16) (18 - 18)  SpO2: 99% (12 Mar 2024 08:16) (98% - 99%)    Parameters below as of 12 Mar 2024 08:16  Patient On (Oxygen Delivery Method): room air        Neurological Exam:    HF: Patient is alert and oriented x 2. There is no aphasia or dysarthria. Follows  commands.   CN: Pupils are equal and reactive. Extra ocular muscles are intact. There is no facial droop or asymmetry. Tongue is midline. Sensation is intact in the face. Other CN II-XII are intact.   Motor: motor examination all muscles are 5/5 and there is no pronator drift.   Sensory: intact to touch.   DTR: 0-1/4 all 4 extremities. Babinski is negative bilateral.  Co-ord:  Finger to finger to nose is intact. Heel to shin is intact bilaterally.     < from: MR Head No Cont (03.10.24 @ 09:47) >  COMPARISON: Prior CT study of the head from 3/8/2024.    FINDINGS: Multiple small rounded nonspecific foci of T2/FLAIR   hyperintensity are noted throughout the deep and periventricular white   matter of the cerebral hemispheres. There is no associated mass effect.   There is no evidence of acute ischemia on the diffusion-weighted images.    There is diffuse cerebral volume loss with prominence of the sulci,   fissures, and cisternal spaces which is normal for the patient's age.   Ventricular size and configuration is unremarkable. Flow-voids are noted   throughout the major intracranial vessels, on the T2 weighted images,   consistent with their patency. The sellar region and posterior fossa   appear unremarkable.    The paranasal sinuses and tympanomastoid cavities are clear. Calvarial   signal is within normal limits. There is evidence of bilateral cataract   removal.    IMPRESSION: No acute intracranial hemorrhage or evidence of acute   ischemia.    Multiple small rounded nonspecific abnormal white matter foci of T2/FLAIR   prolongation statistically favoring microvascular type changes.    < end of copied text >    < from: US Duplex Carotid Arteries Complete, Bilateral (03.10.24 @ 15:43) >  Antegrade flow is noted within both vertebral arteries.    IMPRESSION: No significant hemodynamic stenosis of either carotid artery.    Measurement of carotid stenosis is based on velocity parameters that   correlate the residual internal carotid diameter with that of the more   distal vessel in accordance with a method such as the [place] American   Symptomatic Carotid Endarterectomy Trial (NASCET).    --- End of Report ---    < end of copied text >

## 2024-03-12 NOTE — PROGRESS NOTE ADULT - ASSESSMENT
77 yo F BIBEMS with PMHx of [employer] loop recorder, palpitations, HTN, HLD, hypothyroid not on any OACs, with c/o left sided headache and neck pain since fall earlier today.  Patient was sitting at the table in the kitchen and then was on the floor, landed on  her left side. She had +LOC. States she "passes out sometimes." Does not recall calling EMS. Lives alone. No n/v. No chest pain or abd pain.  No fevers/ chills/ cough.  No urine symptoms. Patient was not sure how long she was on floor or when the fall happened, today or yesterday. Denies tobacco and EtOH.     A/P:    1.  Syncope: recurrent  admit to tele  has episodes of PAF but they don't correspond to syncopal episodes  orthostatics neg  echo, normal  cardio consult appreciated      2.  Falls: recurrent  -keep on Fall precaution  -PT eval    3.  Parox A fib: recorded on ILR  no AC per cardio for recurrent falls  burden less than 0.1%    4. Headache: likely migraines; has hx of it  imitrex prn  MRI brain neg for cva  neuro consult r/o seizures; 24 hr video EEG   ESR normal, 5  EEG neg for seizures    5.   SCD for DVT ppx    6.  Code status  -Full code     POC discussed with pt, daughter, team     Dispo: d/c home 3/13 after arrangement of A has been made
79 yo F BIBEMS with PMHx of [employer] loop recorder, palpitations, HTN, HLD, hypothyroid not on any OACs, with c/o left sided headache and neck pain since fall earlier today.  Patient was sitting at the table in the kitchen and then was on the floor, landed on  her left side. She had +LOC. States she "passes out sometimes." Does not recall calling EMS. Lives alone. No n/v. No chest pain or abd pain.  No fevers/ chills/ cough.  No urine symptoms. Patient was not sure how long she was on floor or when the fall happened, today or yesterday. Denies tobacco and EtOH.     A/P:    1.  Syncope: recurrent  admit to tele  has episodes of PAF but they don't correspond to syncopal episodes  orthostatics neg  echo  cardio consult appreciated      2.  Falls: recurrent  -keep on Fall precaution  -PT eval    3.  Parox A fib: recorded on ILR  no AC per cardio for recurrent falls  burden less than 0.1%    4. Headache: likely migraines; has hx of it  imitrex prn  MRI brain neg for cva  neuro consult r/o seizures; 24 hr video EEG   ESR normal, 5    5.   SCD for DVT ppx    6.  Code status  -Full code     POC discussed with pt, daughter, team 
79 yo F BIBEMS with PMHx of medtronic loop recorder, palpitations, HTN, HLD, hypothyroid not on any OACs, with c/o left sided headache and neck pain since fall earlier today.  Patient was sitting at the table in the kitchen and then was on the floor, landed on  her left side. She had +LOC. States she "passes out sometimes." Does not recall calling EMS. Lives alone. No n/v. No chest pain or abd pain.  No fevers/ chills/ cough.  No urine symptoms. Patient was not sure how long she was on floor or when the fall happened, today or yesterday. Denies tobacco and EtOH.     A/P:    1.  Syncope: recurrent  -Follow clinically in telemetry unit  -Follow orthostatic  -Follow echocardiogram  -Follow cardiology consult    2.  Fall  -keep on Fall precaution  -follow PT eval    3.  Parox A fib: recorded on ILR  no AC per cardio    4. Headache: MRI brain to r/o CVAs    5.   SCD for DVT ppx    6.  Code status  -Full code     POC discussed with pt, team, Dr. Braxton, EP Luz Maria Rogel
78 yr old woman hx of dementia, ADM after syncopal episode, similar events in the past, not orthostatic, loop recorder negative for arrythmia during episode. EEG at bedside negative for seizure activity, though pt pulled leads early. No evidence for CVA, doubt TIA.   Suggest:  encourage fluids  advised to be careful when she stands, wait couple of minutes  No further neuro recommendations.
IMP:  1. syncope. no evidence significant arrythmia on ILR or tele. likely was orthostatic. patient can remember if she went to stand up when she fell.  2. dementia  3. HLD    Plan:  await echo  re check orthostatic bp   inc activity    
79 yo F BIBEMS with PMHx of medtronic loop recorder, palpitations, HTN, HLD, hypothyroid not on any OACs, with c/o left sided headache and neck pain since fall earlier today.  Patient was sitting at the table in the kitchen and then was on the floor, landed on  her left side. She had +LOC. States she "passes out sometimes." Does not recall calling EMS. Lives alone. No n/v. No chest pain or abd pain.  No fevers/ chills/ cough.  No urine symptoms. Patient was not sure how long she was on floor or when the fall happened, today or yesterday. Denies tobacco and EtOH.     A/P:    1.  Syncope: recurrent  admit to tele  has episodes of PAF but they dont correspond to syncopal episodes  orthostatics neg  echo  cardio consult appreciated      2.  Falls: recurrent  -keep on Fall precaution  -PT eval    3.  Parox A fib: recorded on ILR  no AC per cardio for recurrent falls  burden less than 0.1%    4. Headache: MRI brain neg for cva  neuro consult  ESR    5.   SCD for DVT ppx    6.  Code status  -Full code     POC discussed with pt, team, tried calling daughter. 
 syncope   No events on telemetry.   loop recorder interrogation revealed less than 0.1% atrial fibrillation.    all of the episodes are less than 2 minutes   will hold off full-dose anticoagulation for now especially with her history of falls.  Orthostatic blood pressure readings negative for her orthostatic hypotension.    She had prior episodes of syncope in the past.    Echocardiogram pending   She appears euvolemic on physical exam     Hyperlipidemia   Continue atorvastatin     Hypothyroidism  She is on levothyroxine     All the imaging studies and labs reviewed.        Headache   Management per primary team   Discussed with Dr. Palacios yesterday       Other medical issues- Management per primary team.   Thank you for allowing me to participate in the care of this patient. Please feel free to contact me with any questions.     
IMP:  1. syncope. no evidence significant arrythmia on ILR or tele. likely was orthostatic. patient can remember if she went to stand up when she fell.  2. dementia  3. HLD    Plan:  await echo  re check orthostatic bp   inc activity    3/12/24  echo is normal.   check orthostatic vitals   ambulation.

## 2024-03-12 NOTE — DISCHARGE NOTE NURSING/CASE MANAGEMENT/SOCIAL WORK - NSDCPEFALRISK_GEN_ALL_CORE
For information on Fall & Injury Prevention, visit: [email or web address] OR  [email or web address] OR  https://www.cdc.gov/steadi/patient.html

## 2024-03-13 ENCOUNTER — TRANSCRIPTION ENCOUNTER (OUTPATIENT)
Age: 79
End: 2024-03-13

## 2024-03-13 VITALS
HEART RATE: 59 BPM | SYSTOLIC BLOOD PRESSURE: 111 MMHG | RESPIRATION RATE: 18 BRPM | DIASTOLIC BLOOD PRESSURE: 69 MMHG | TEMPERATURE: 98 F | OXYGEN SATURATION: 98 %

## 2024-03-13 LAB — SARS-COV-2 RNA SPEC QL NAA+PROBE: SIGNIFICANT CHANGE UP

## 2024-03-13 PROCEDURE — 99239 HOSP IP/OBS DSCHRG MGMT >30: CPT

## 2024-03-13 RX ORDER — SODIUM CHLORIDE 9 MG/ML
1000 INJECTION INTRAMUSCULAR; INTRAVENOUS; SUBCUTANEOUS ONCE
Refills: 0 | Status: COMPLETED | OUTPATIENT
Start: 2024-03-13 | End: 2024-03-13

## 2024-03-13 RX ORDER — ALPRAZOLAM 0.25 MG
1 TABLET ORAL
Qty: 0 | Refills: 0 | DISCHARGE
Start: 2024-03-13

## 2024-03-13 RX ORDER — MECLIZINE HCL 12.5 MG
1 TABLET ORAL
Qty: 0 | Refills: 0 | DISCHARGE
Start: 2024-03-13

## 2024-03-13 RX ORDER — ASPIRIN/CALCIUM CARB/MAGNESIUM 324 MG
1 TABLET ORAL
Qty: 30 | Refills: 0
Start: 2024-03-13 | End: 2024-04-11

## 2024-03-13 RX ORDER — ACETAMINOPHEN 500 MG
2 TABLET ORAL
Qty: 0 | Refills: 0 | DISCHARGE
Start: 2024-03-13

## 2024-03-13 RX ORDER — IBUPROFEN 200 MG
1 TABLET ORAL
Qty: 0 | Refills: 0 | DISCHARGE
Start: 2024-03-13

## 2024-03-13 RX ADMIN — Medication 12.5 MILLIGRAM(S): at 06:01

## 2024-03-13 RX ADMIN — SERTRALINE 25 MILLIGRAM(S): 25 TABLET, FILM COATED ORAL at 10:19

## 2024-03-13 RX ADMIN — Medication 12.5 MILLIGRAM(S): at 16:02

## 2024-03-13 RX ADMIN — Medication 50 MICROGRAM(S): at 06:01

## 2024-03-13 RX ADMIN — SODIUM CHLORIDE 1000 MILLILITER(S): 9 INJECTION INTRAMUSCULAR; INTRAVENOUS; SUBCUTANEOUS at 12:27

## 2024-03-13 NOTE — DISCHARGE NOTE PROVIDER - HOSPITAL COURSE
PHYSICAL EXAM:    Daily     Daily     Vital Signs Last 24 Hrs  T(C): 36.4 (13 Mar 2024 08:33), Max: 36.7 (12 Mar 2024 15:11)  T(F): 97.6 (13 Mar 2024 08:33), Max: 98 (12 Mar 2024 15:11)  HR: 83 (12 Mar 2024 21:31) (82 - 83)  BP: 124/84 (12 Mar 2024 21:31) (124/84 - 124/84)  BP(mean): --  RR: 18 (12 Mar 2024 21:31) (18 - 18)  SpO2: 94% (12 Mar 2024 21:31) (94% - 99%)    Constitutional: Weak  appearing  HEENT: Atraumatic, JEREMIAS, Normal, No congestion  Respiratory: Breath Sounds normal, no rhonchi/wheeze  Cardiovascular: N S1S2;   Gastrointestinal: Abdomen soft, non tender, Bowel Sounds present  Extremities: No edema, peripheral pulses present  Neurological: AAO x 3, no gross focal motor deficits  Skin: Non cellulitic, no rash, ulcers  Lymph Nodes: No lymphadenopathy noted  Back: No CVA tenderness   Musculoskeletal: non tender  Breasts: Deferred  Genitourinary: deferred  Rectal: Deferred      79 yo F BIBEMS with PMHx of [employer] loop recorder, palpitations, HTN, HLD, hypothyroid not on any OACs, with c/o left sided headache and neck pain since fall earlier today.  Patient was sitting at the table in the kitchen and then was on the floor, landed on  her left side. She had +LOC. States she "passes out sometimes." Does not recall calling EMS. Lives alone. No n/v. No chest pain or abd pain.  No fevers/ chills/ cough.  No urine symptoms. Patient was not sure how long she was on floor or when the fall happened, today or yesterday. Denies tobacco and EtOH.     A/P:    1.  Syncope: recurrent  admit to tele  has episodes of PAF but they don't correspond to syncopal episodes  orthostatics neg  echo, normal  cardio consult appreciated      2.  Falls: recurrent  -keep on Fall precaution  -PT eval; [facility]    3.  Parox A fib: recorded on ILR  no AC per cardio for recurrent falls  burden less than 0.1%  asa 81 mg daily    4. Headache: likely migraines; has hx of it  imitrex prn  MRI brain neg for cva  neuro consult r/o seizures; 24 hr video EEG   ESR normal, 5  EEG neg for seizures  pt cleared for discharge    5.  Code status  -Full code     POC discussed with pt, daughter, team    d/c to [facility]    time spent 45 min

## 2024-03-13 NOTE — DISCHARGE NOTE PROVIDER - CARE PROVIDERS DIRECT ADDRESSES
[email or web address]

## 2024-03-13 NOTE — DISCHARGE NOTE PROVIDER - PROVIDER TOKENS
PROVIDER:[TOKEN:[[id number]]],PROVIDER:[TOKEN:[[id number]]],PROVIDER:[TOKEN:[[id number]]]

## 2024-03-13 NOTE — DISCHARGE NOTE PROVIDER - NSDCCPCAREPLAN_GEN_ALL_CORE_FT
PRINCIPAL DISCHARGE DIAGNOSIS  Diagnosis: Syncope  Assessment and Plan of Treatment: vasovagal  no seizures  no cardiac etiology  meclizine prn  f/u with cardiology in 1 week  f/u with your PCP in 1 week        SECONDARY DISCHARGE DIAGNOSES  Diagnosis: Paroxysmal atrial fibrillation  Assessment and Plan of Treatment: minimal episodes lasting less than 2 min  no anticiagulation sec to falls  asa 81 mg daily  f/u with cardiology in 1 week  f/u with your PCP in 1 week

## 2024-03-13 NOTE — DISCHARGE NOTE PROVIDER - DETAILS OF MALNUTRITION DIAGNOSIS/DIAGNOSES
This patient has been assessed with a concern for Malnutrition and was treated during this hospitalization for the following Nutrition diagnosis/diagnoses:     -  03/10/2024: Severe protein-calorie malnutrition

## 2024-03-13 NOTE — DISCHARGE NOTE PROVIDER - NSDCMRMEDTOKEN_GEN_ALL_CORE_FT
acetaminophen 325 mg oral tablet: 2 tab(s) orally every 6 hours As needed Mild Pain (1 - 3)  ALPRAZolam 0.25 mg oral tablet: 1 tab(s) orally every 12 hours As needed anxiety  aluminum hydroxide-magnesium hydroxide 200 mg-200 mg/5 mL oral suspension: 30 milliliter(s) orally every 4 hours As needed Dyspepsia  aspirin 81 mg oral delayed release tablet: 1 tab(s) orally once a day  atorvastatin 10 mg oral tablet: 2 tab(s) orally once a day *** on hold per daughter***  donepezil 10 mg oral tablet: 1 tab(s) orally once a day  levothyroxine 50 mcg (0.05 mg) oral tablet: 1 tab(s) orally once a day  meclizine 12.5 mg oral tablet: 1 tab(s) orally every 8 hours as needed for  dizziness  sertraline 25 mg oral tablet: 1 tab(s) orally once a day

## 2024-03-13 NOTE — DISCHARGE NOTE PROVIDER - CARE PROVIDER_API CALL
Jamie John  Neurology  [address] [place] [address], [address]  [place] [address]  Phone: [phone]  Fax: [phone]  Follow Up Time:     Marci Flynn  Family Medicine  [address] [place] [address]  Phone: [phone]  Fax: [phone]  Follow Up Time:     Laurel Barajas  Cardiovascular Disease  [address] [place] [id number]  Phone: [phone]  Fax: [phone]  Follow Up Time:

## 2024-03-14 ENCOUNTER — EMERGENCY (EMERGENCY)
Facility: HOSPITAL | Age: 79
LOS: 0 days | Discharge: HOME | End: 2024-03-14
Attending: STUDENT IN AN ORGANIZED HEALTH CARE EDUCATION/TRAINING PROGRAM
Payer: MEDICARE

## 2024-03-14 ENCOUNTER — NON-APPOINTMENT (OUTPATIENT)
Age: 79
End: 2024-03-14

## 2024-03-14 VITALS
TEMPERATURE: 98 F | HEART RATE: 64 BPM | SYSTOLIC BLOOD PRESSURE: 119 MMHG | RESPIRATION RATE: 18 BRPM | OXYGEN SATURATION: 98 % | DIASTOLIC BLOOD PRESSURE: 93 MMHG

## 2024-03-14 VITALS
TEMPERATURE: 98 F | RESPIRATION RATE: 24 BRPM | SYSTOLIC BLOOD PRESSURE: 139 MMHG | WEIGHT: 100.09 LBS | HEART RATE: 58 BPM | OXYGEN SATURATION: 100 % | HEIGHT: 61 IN | DIASTOLIC BLOOD PRESSURE: 85 MMHG

## 2024-03-14 DIAGNOSIS — Z98.890 OTHER SPECIFIED POSTPROCEDURAL STATES: Chronic | ICD-10-CM

## 2024-03-14 DIAGNOSIS — R06.02 SHORTNESS OF BREATH: ICD-10-CM

## 2024-03-14 DIAGNOSIS — R00.2 PALPITATIONS: ICD-10-CM

## 2024-03-14 DIAGNOSIS — E03.9 HYPOTHYROIDISM, UNSPECIFIED: ICD-10-CM

## 2024-03-14 DIAGNOSIS — J44.9 CHRONIC OBSTRUCTIVE PULMONARY DISEASE, UNSPECIFIED: ICD-10-CM

## 2024-03-14 DIAGNOSIS — I10 ESSENTIAL (PRIMARY) HYPERTENSION: ICD-10-CM

## 2024-03-14 DIAGNOSIS — R07.9 CHEST PAIN, UNSPECIFIED: ICD-10-CM

## 2024-03-14 DIAGNOSIS — Z88.5 ALLERGY STATUS TO NARCOTIC AGENT: ICD-10-CM

## 2024-03-14 LAB
ALBUMIN SERPL ELPH-MCNC: 3.4 G/DL — SIGNIFICANT CHANGE UP (ref 3.3–5)
ALP SERPL-CCNC: 57 U/L — SIGNIFICANT CHANGE UP (ref 40–120)
ALT FLD-CCNC: 11 U/L — LOW (ref 12–78)
ANION GAP SERPL CALC-SCNC: 8 MMOL/L — SIGNIFICANT CHANGE UP (ref 5–17)
AST SERPL-CCNC: 12 U/L — LOW (ref 15–37)
BASOPHILS # BLD AUTO: 0.03 K/UL — SIGNIFICANT CHANGE UP (ref 0–0.2)
BASOPHILS NFR BLD AUTO: 0.5 % — SIGNIFICANT CHANGE UP (ref 0–2)
BILIRUB SERPL-MCNC: 1 MG/DL — SIGNIFICANT CHANGE UP (ref 0.2–1.2)
BUN SERPL-MCNC: 15 MG/DL — SIGNIFICANT CHANGE UP (ref 7–23)
CALCIUM SERPL-MCNC: 8.5 MG/DL — SIGNIFICANT CHANGE UP (ref 8.5–10.1)
CHLORIDE SERPL-SCNC: 112 MMOL/L — HIGH (ref 96–108)
CO2 SERPL-SCNC: 21 MMOL/L — LOW (ref 22–31)
CREAT SERPL-MCNC: 0.72 MG/DL — SIGNIFICANT CHANGE UP (ref 0.5–1.3)
EGFR: 87 ML/MIN/1.73M2 — SIGNIFICANT CHANGE UP
EOSINOPHIL # BLD AUTO: 0.2 K/UL — SIGNIFICANT CHANGE UP (ref 0–0.5)
EOSINOPHIL NFR BLD AUTO: 3.2 % — SIGNIFICANT CHANGE UP (ref 0–6)
GLUCOSE SERPL-MCNC: 98 MG/DL — SIGNIFICANT CHANGE UP (ref 70–99)
HCT VFR BLD CALC: 36.7 % — SIGNIFICANT CHANGE UP (ref 34.5–45)
HGB BLD-MCNC: 12.8 G/DL — SIGNIFICANT CHANGE UP (ref 11.5–15.5)
IMM GRANULOCYTES NFR BLD AUTO: 0.3 % — SIGNIFICANT CHANGE UP (ref 0–0.9)
LYMPHOCYTES # BLD AUTO: 1.65 K/UL — SIGNIFICANT CHANGE UP (ref 1–3.3)
LYMPHOCYTES # BLD AUTO: 26.1 % — SIGNIFICANT CHANGE UP (ref 13–44)
MAGNESIUM SERPL-MCNC: 1.8 MG/DL — SIGNIFICANT CHANGE UP (ref 1.6–2.6)
MCHC RBC-ENTMCNC: 32.1 PG — SIGNIFICANT CHANGE UP (ref 27–34)
MCHC RBC-ENTMCNC: 34.9 GM/DL — SIGNIFICANT CHANGE UP (ref 32–36)
MCV RBC AUTO: 92 FL — SIGNIFICANT CHANGE UP (ref 80–100)
MONOCYTES # BLD AUTO: 0.64 K/UL — SIGNIFICANT CHANGE UP (ref 0–0.9)
MONOCYTES NFR BLD AUTO: 10.1 % — SIGNIFICANT CHANGE UP (ref 2–14)
NEUTROPHILS # BLD AUTO: 3.79 K/UL — SIGNIFICANT CHANGE UP (ref 1.8–7.4)
NEUTROPHILS NFR BLD AUTO: 59.8 % — SIGNIFICANT CHANGE UP (ref 43–77)
NT-PROBNP SERPL-SCNC: 103 PG/ML — SIGNIFICANT CHANGE UP (ref 0–450)
PLATELET # BLD AUTO: 295 K/UL — SIGNIFICANT CHANGE UP (ref 150–400)
POTASSIUM SERPL-MCNC: 3.7 MMOL/L — SIGNIFICANT CHANGE UP (ref 3.5–5.3)
POTASSIUM SERPL-SCNC: 3.7 MMOL/L — SIGNIFICANT CHANGE UP (ref 3.5–5.3)
PROT SERPL-MCNC: 6.3 GM/DL — SIGNIFICANT CHANGE UP (ref 6–8.3)
RBC # BLD: 3.99 M/UL — SIGNIFICANT CHANGE UP (ref 3.8–5.2)
RBC # FLD: 13 % — SIGNIFICANT CHANGE UP (ref 10.3–14.5)
SODIUM SERPL-SCNC: 141 MMOL/L — SIGNIFICANT CHANGE UP (ref 135–145)
TROPONIN I, HIGH SENSITIVITY RESULT: 3.21 NG/L — SIGNIFICANT CHANGE UP
WBC # BLD: 6.33 K/UL — SIGNIFICANT CHANGE UP (ref 3.8–10.5)
WBC # FLD AUTO: 6.33 K/UL — SIGNIFICANT CHANGE UP (ref 3.8–10.5)

## 2024-03-14 PROCEDURE — 80053 COMPREHEN METABOLIC PANEL: CPT

## 2024-03-14 PROCEDURE — 71045 X-RAY EXAM CHEST 1 VIEW: CPT | Mod: 26

## 2024-03-14 PROCEDURE — 83880 ASSAY OF NATRIURETIC PEPTIDE: CPT

## 2024-03-14 PROCEDURE — 84484 ASSAY OF TROPONIN QUANT: CPT

## 2024-03-14 PROCEDURE — 99285 EMERGENCY DEPT VISIT HI MDM: CPT

## 2024-03-14 PROCEDURE — 71045 X-RAY EXAM CHEST 1 VIEW: CPT

## 2024-03-14 PROCEDURE — 99285 EMERGENCY DEPT VISIT HI MDM: CPT | Mod: 25

## 2024-03-14 PROCEDURE — 85025 COMPLETE CBC W/AUTO DIFF WBC: CPT

## 2024-03-14 PROCEDURE — 93005 ELECTROCARDIOGRAM TRACING: CPT

## 2024-03-14 PROCEDURE — 36415 COLL VENOUS BLD VENIPUNCTURE: CPT

## 2024-03-14 PROCEDURE — 83735 ASSAY OF MAGNESIUM: CPT

## 2024-03-14 PROCEDURE — 93010 ELECTROCARDIOGRAM REPORT: CPT

## 2024-03-14 NOTE — ED ADULT NURSE REASSESSMENT NOTE - NS ED NURSE REASSESS COMMENT FT1
Pt awake resting in bed. Breathing normal and unlabored, denying any complaints at this time. waiting transport. Pt safety and comfort maintained.

## 2024-03-14 NOTE — ED ADULT NURSE NOTE - OBJECTIVE STATEMENT
Pt was send from [facility] for chest pain.on arrival pt has no complain pt is confused a/o name and place. pt said she had a syncopal episode.

## 2024-03-14 NOTE — ED ADULT NURSE NOTE - CHIEF COMPLAINT QUOTE
Patient presents to the ER from [facility] with complaints of chest pain and shortness of breath. Hx: loop recorder

## 2024-03-14 NOTE — ED PROVIDER NOTE - NSFOLLOWUPINSTRUCTIONS_ED_ALL_ED_FT
Seek immediate medical assistance for any new or worsening symptoms. If you have issues obtaining follow up, please call: [phone] or [phone]  to obtain a doctor or specialist who takes your insurance in your area.     Follow up with her cardiologist.       Take Tylenol as needed for pain.

## 2024-03-14 NOTE — ED PROVIDER NOTE - OBJECTIVE STATEMENT
77 y/o female with a PMHx of COPD, HTN, hypothyroid, [employer] loop recorder, syncope presents to the ED BIBA from [facility] c/o CP, palpitations, and SOB. Pt recently admitted for syncope and d/c yesterday. Hx limited as pt can not remember details.

## 2024-03-14 NOTE — ED PROVIDER NOTE - CLINICAL SUMMARY MEDICAL DECISION MAKING FREE TEXT BOX
P who is unable to provide accurate hx presents from nursing home with reported CP. Pt can not elaborate on CP. Pt was d/c from hospital yesterday after extensive cardiac workup including cardio consult, echo, tele monitoring, labs, orthostatics. Workup was reassuring and pt d/c. Plan for today: troponin, labs, chest XR, 12 lead, monitor, if all negative and pt pain free, will d/c.

## 2024-03-14 NOTE — ED ADULT TRIAGE NOTE - CHIEF COMPLAINT QUOTE
abdominal pain Patient presents to the ER from [facility] with complaints of chest pain and shortness of breath. Hx: loop recorder

## 2024-03-14 NOTE — ED PROVIDER NOTE - PATIENT PORTAL LINK FT
You can access the FollowMyHealth Patient Portal offered by [facility] by registering at the following website: [email or web address]. By joining [employer]’s FollowMyHealth portal, you will also be able to view your health information using other applications (apps) compatible with our system.

## 2024-03-15 ENCOUNTER — APPOINTMENT (OUTPATIENT)
Dept: ELECTROPHYSIOLOGY | Facility: CLINIC | Age: 79
End: 2024-03-15
Payer: MEDICARE

## 2024-03-15 ENCOUNTER — EMERGENCY (EMERGENCY)
Facility: HOSPITAL | Age: 79
LOS: 0 days | Discharge: HOME | End: 2024-03-16
Attending: EMERGENCY MEDICINE
Payer: MEDICARE

## 2024-03-15 VITALS
TEMPERATURE: 97 F | HEART RATE: 63 BPM | RESPIRATION RATE: 18 BRPM | DIASTOLIC BLOOD PRESSURE: 96 MMHG | SYSTOLIC BLOOD PRESSURE: 147 MMHG | HEIGHT: 61 IN | OXYGEN SATURATION: 99 %

## 2024-03-15 DIAGNOSIS — Z95.818 PRESENCE OF OTHER CARDIAC IMPLANTS AND GRAFTS: ICD-10-CM

## 2024-03-15 DIAGNOSIS — R55 SYNCOPE AND COLLAPSE: ICD-10-CM

## 2024-03-15 DIAGNOSIS — Z98.890 OTHER SPECIFIED POSTPROCEDURAL STATES: Chronic | ICD-10-CM

## 2024-03-15 DIAGNOSIS — E03.9 HYPOTHYROIDISM, UNSPECIFIED: ICD-10-CM

## 2024-03-15 DIAGNOSIS — R00.1 BRADYCARDIA, UNSPECIFIED: ICD-10-CM

## 2024-03-15 DIAGNOSIS — J44.9 CHRONIC OBSTRUCTIVE PULMONARY DISEASE, UNSPECIFIED: ICD-10-CM

## 2024-03-15 DIAGNOSIS — Z88.0 ALLERGY STATUS TO PENICILLIN: ICD-10-CM

## 2024-03-15 DIAGNOSIS — F03.90 UNSPECIFIED DEMENTIA WITHOUT BEHAVIORAL DISTURBANCE: ICD-10-CM

## 2024-03-15 DIAGNOSIS — Z88.5 ALLERGY STATUS TO NARCOTIC AGENT: ICD-10-CM

## 2024-03-15 DIAGNOSIS — I10 ESSENTIAL (PRIMARY) HYPERTENSION: ICD-10-CM

## 2024-03-15 PROCEDURE — 71045 X-RAY EXAM CHEST 1 VIEW: CPT | Mod: 26

## 2024-03-15 PROCEDURE — 99283 EMERGENCY DEPT VISIT LOW MDM: CPT | Mod: 25

## 2024-03-15 PROCEDURE — 93010 ELECTROCARDIOGRAM REPORT: CPT

## 2024-03-15 PROCEDURE — 93298 REM INTERROG DEV EVAL SCRMS: CPT

## 2024-03-15 PROCEDURE — 93005 ELECTROCARDIOGRAM TRACING: CPT

## 2024-03-15 PROCEDURE — 99284 EMERGENCY DEPT VISIT MOD MDM: CPT

## 2024-03-15 PROCEDURE — 71045 X-RAY EXAM CHEST 1 VIEW: CPT

## 2024-03-15 RX ORDER — SODIUM CHLORIDE 9 MG/ML
3 INJECTION INTRAMUSCULAR; INTRAVENOUS; SUBCUTANEOUS ONCE
Refills: 0 | Status: DISCONTINUED | OUTPATIENT
Start: 2024-03-15 | End: 2024-03-15

## 2024-03-15 NOTE — ED ADULT NURSE NOTE - NSFALLUNIVINTERV_ED_ALL_ED
Bed/Stretcher in lowest position, wheels locked, appropriate side rails in place/Call bell, personal items and telephone in reach/Instruct patient to call for assistance before getting out of bed/chair/stretcher/Non-slip footwear applied when patient is off stretcher/[place] to call system/Physically safe environment - no spills, clutter or unnecessary equipment/Purposeful proactive rounding/Room/bathroom lighting operational, light cord in reach

## 2024-03-15 NOTE — ED PROVIDER NOTE - NSFOLLOWUPINSTRUCTIONS_ED_ALL_ED_FT
Syncope    Syncope is when you temporarily lose consciousness, also called fainting or passing out. It is caused by a sudden decrease in blood flow to the brain. Even though most causes of syncope are not dangerous, syncope can possibly be a sign of a serious medical problem. Signs that you may be about to faint include feeling dizzy, lightheaded, nausea, visual changes, or cold/clammy skin. Do not drive, operate heavy machinery, or play sports until your health care provider says it is okay.    SEEK IMMEDIATE MEDICAL CARE IF YOU HAVE ANY OF THE FOLLOWING SYMPTOMS: severe headache, pain in your chest/abdomen/back, bleeding from your mouth or rectum, palpitations, shortness of breath, pain with breathing, seizure, confusion, or trouble walking.      Follow-up with your cardiologist within the next 1 to 3 days.

## 2024-03-15 NOTE — ED ADULT TRIAGE NOTE - CHIEF COMPLAINT QUOTE
pt presents to the ED from [facility] for syncope off the side of her bed. denies head strike. reports hx of syncope. denies SOB.  pt A&Ox3, well appearing, and ambulatory at baseline with no further complaints or discomforts reported at this time. sent for EKG

## 2024-03-15 NOTE — ED PROVIDER NOTE - PHYSICAL EXAMINATION
Gen: awake, alert, WD, WN, NAD  Head:  NC, AT  ENT:  PERRL, moist mucous membranes, OP-clear  Neck: supple, nontender  CV:  S1 S2, bradycardic rate normal rhythm, no M/G/R  Pulm:  CTAB, good air movement  Abd:  Soft, nontender, nondistended, +BS, no rebound/guarding  Back:  no CVAT  Ext:  warm, well perfused, moving all extremities spontaneously, no peripheral edema, distal pulses intact  Skin: intact  Neuro:  A&Ox3, no focal neuro deficit, speech clear

## 2024-03-15 NOTE — ED ADULT NURSE NOTE - OBJECTIVE STATEMENT
pt presents to ER c/o syncopal episode earlier tonight. sent in from [facility], reports falling out of bed. denies headstrike, denies LOC. denies any other medical complaints.

## 2024-03-15 NOTE — ED PROVIDER NOTE - PATIENT PORTAL LINK FT
You can access the FollowMyHealth Patient Portal offered by [facility] by registering at the following website: [email or web address]. By joining [employer]’s FollowMyHealth portal, you will also be able to view your health information using other applications (apps) compatible with our system.

## 2024-03-15 NOTE — ED PROVIDER NOTE - CLINICAL SUMMARY MEDICAL DECISION MAKING FREE TEXT BOX
Reviewed previous notes and testing from ED and recent admission, unchanged from today, likely no benefit from admission for w/u syncope. Patient has no complaints at this time. No evidence of bleeding, bruising, or any trauma. Plan EKG, BGM, CX. Likely discharge back to [facility].

## 2024-03-15 NOTE — ED PROVIDER NOTE - OBJECTIVE STATEMENT
78 year old female with PMHx of HTN, hypothyroid, COPD, dementia, loop recorder, frequent syncopal episode; presents to ED FROM [place] s/p syncope, no head-strike. Denies any pain. Seen in ED yesterday with unremarkable EKG and labs. Previous admission for syncope workup, discharged on 3/13. Loop recorder recently interrogated in patient's recent admission, no sign of arrhythmia.

## 2024-03-16 VITALS
RESPIRATION RATE: 16 BRPM | OXYGEN SATURATION: 98 % | TEMPERATURE: 98 F | SYSTOLIC BLOOD PRESSURE: 133 MMHG | DIASTOLIC BLOOD PRESSURE: 79 MMHG | HEART RATE: 71 BPM

## 2024-03-19 LAB — VIT B2 SERPL-MCNC: 240 UG/L — SIGNIFICANT CHANGE UP (ref 137–370)

## 2024-03-20 DIAGNOSIS — I10 ESSENTIAL (PRIMARY) HYPERTENSION: ICD-10-CM

## 2024-03-20 DIAGNOSIS — E03.9 HYPOTHYROIDISM, UNSPECIFIED: ICD-10-CM

## 2024-03-20 DIAGNOSIS — F03.90 UNSPECIFIED DEMENTIA, UNSPECIFIED SEVERITY, WITHOUT BEHAVIORAL DISTURBANCE, PSYCHOTIC DISTURBANCE, MOOD DISTURBANCE, AND ANXIETY: ICD-10-CM

## 2024-03-20 DIAGNOSIS — E43 UNSPECIFIED SEVERE PROTEIN-CALORIE MALNUTRITION: ICD-10-CM

## 2024-03-20 DIAGNOSIS — M54.2 CERVICALGIA: ICD-10-CM

## 2024-03-20 DIAGNOSIS — G43.909 MIGRAINE, UNSPECIFIED, NOT INTRACTABLE, WITHOUT STATUS MIGRAINOSUS: ICD-10-CM

## 2024-03-20 DIAGNOSIS — Z91.81 HISTORY OF FALLING: ICD-10-CM

## 2024-03-20 DIAGNOSIS — Z88.0 ALLERGY STATUS TO PENICILLIN: ICD-10-CM

## 2024-03-20 DIAGNOSIS — Z11.52 ENCOUNTER FOR SCREENING FOR COVID-19: ICD-10-CM

## 2024-03-20 DIAGNOSIS — Z79.890 HORMONE REPLACEMENT THERAPY: ICD-10-CM

## 2024-03-20 DIAGNOSIS — Z79.899 OTHER LONG TERM (CURRENT) DRUG THERAPY: ICD-10-CM

## 2024-03-20 DIAGNOSIS — Z88.5 ALLERGY STATUS TO NARCOTIC AGENT: ICD-10-CM

## 2024-03-20 DIAGNOSIS — E78.5 HYPERLIPIDEMIA, UNSPECIFIED: ICD-10-CM

## 2024-03-20 DIAGNOSIS — Z45.09 ENCOUNTER FOR ADJUSTMENT AND MANAGEMENT OF OTHER CARDIAC DEVICE: ICD-10-CM

## 2024-03-20 DIAGNOSIS — R29.6 REPEATED FALLS: ICD-10-CM

## 2024-03-20 DIAGNOSIS — I48.0 PAROXYSMAL ATRIAL FIBRILLATION: ICD-10-CM

## 2024-03-20 DIAGNOSIS — R55 SYNCOPE AND COLLAPSE: ICD-10-CM

## 2024-04-15 ENCOUNTER — APPOINTMENT (OUTPATIENT)
Dept: ELECTROPHYSIOLOGY | Facility: CLINIC | Age: 79
End: 2024-04-15

## 2024-05-15 ENCOUNTER — APPOINTMENT (OUTPATIENT)
Dept: ELECTROPHYSIOLOGY | Facility: CLINIC | Age: 79
End: 2024-05-15

## 2024-06-19 ENCOUNTER — APPOINTMENT (OUTPATIENT)
Dept: ELECTROPHYSIOLOGY | Facility: CLINIC | Age: 79
End: 2024-06-19
Payer: MEDICARE

## 2024-06-20 ENCOUNTER — NON-APPOINTMENT (OUTPATIENT)
Age: 79
End: 2024-06-20

## 2024-06-20 PROCEDURE — 93298 REM INTERROG DEV EVAL SCRMS: CPT

## 2024-07-06 ENCOUNTER — NON-APPOINTMENT (OUTPATIENT)
Age: 79
End: 2024-07-06

## 2024-07-09 ENCOUNTER — EMERGENCY (EMERGENCY)
Facility: HOSPITAL | Age: 79
LOS: 0 days | Discharge: HOME | End: 2024-07-10
Attending: STUDENT IN AN ORGANIZED HEALTH CARE EDUCATION/TRAINING PROGRAM
Payer: MEDICARE

## 2024-07-09 VITALS
OXYGEN SATURATION: 97 % | HEART RATE: 64 BPM | DIASTOLIC BLOOD PRESSURE: 99 MMHG | RESPIRATION RATE: 20 BRPM | TEMPERATURE: 98 F | WEIGHT: 98.11 LBS | SYSTOLIC BLOOD PRESSURE: 120 MMHG | HEIGHT: 60 IN

## 2024-07-09 DIAGNOSIS — Z88.0 ALLERGY STATUS TO PENICILLIN: ICD-10-CM

## 2024-07-09 DIAGNOSIS — R00.2 PALPITATIONS: ICD-10-CM

## 2024-07-09 DIAGNOSIS — Z98.890 OTHER SPECIFIED POSTPROCEDURAL STATES: Chronic | ICD-10-CM

## 2024-07-09 DIAGNOSIS — E03.9 HYPOTHYROIDISM, UNSPECIFIED: ICD-10-CM

## 2024-07-09 DIAGNOSIS — R06.02 SHORTNESS OF BREATH: ICD-10-CM

## 2024-07-09 DIAGNOSIS — Z88.5 ALLERGY STATUS TO NARCOTIC AGENT: ICD-10-CM

## 2024-07-09 DIAGNOSIS — Z86.79 PERSONAL HISTORY OF OTHER DISEASES OF THE CIRCULATORY SYSTEM: ICD-10-CM

## 2024-07-09 DIAGNOSIS — Z95.818 PRESENCE OF OTHER CARDIAC IMPLANTS AND GRAFTS: ICD-10-CM

## 2024-07-09 DIAGNOSIS — I10 ESSENTIAL (PRIMARY) HYPERTENSION: ICD-10-CM

## 2024-07-09 DIAGNOSIS — E78.5 HYPERLIPIDEMIA, UNSPECIFIED: ICD-10-CM

## 2024-07-09 DIAGNOSIS — R06.82 TACHYPNEA, NOT ELSEWHERE CLASSIFIED: ICD-10-CM

## 2024-07-09 LAB
ALBUMIN SERPL ELPH-MCNC: 3.3 G/DL — SIGNIFICANT CHANGE UP (ref 3.3–5)
ALP SERPL-CCNC: 70 U/L — SIGNIFICANT CHANGE UP (ref 40–120)
ALT FLD-CCNC: 23 U/L — SIGNIFICANT CHANGE UP (ref 12–78)
ANION GAP SERPL CALC-SCNC: 5 MMOL/L — SIGNIFICANT CHANGE UP (ref 5–17)
AST SERPL-CCNC: 15 U/L — SIGNIFICANT CHANGE UP (ref 15–37)
BASOPHILS # BLD AUTO: 0.06 K/UL — SIGNIFICANT CHANGE UP (ref 0–0.2)
BASOPHILS NFR BLD AUTO: 1.1 % — SIGNIFICANT CHANGE UP (ref 0–2)
BILIRUB SERPL-MCNC: 0.4 MG/DL — SIGNIFICANT CHANGE UP (ref 0.2–1.2)
BUN SERPL-MCNC: 24 MG/DL — HIGH (ref 7–23)
CALCIUM SERPL-MCNC: 9.1 MG/DL — SIGNIFICANT CHANGE UP (ref 8.5–10.1)
CHLORIDE SERPL-SCNC: 114 MMOL/L — HIGH (ref 96–108)
CO2 SERPL-SCNC: 24 MMOL/L — SIGNIFICANT CHANGE UP (ref 22–31)
CREAT SERPL-MCNC: 1.06 MG/DL — SIGNIFICANT CHANGE UP (ref 0.5–1.3)
D DIMER BLD IA.RAPID-MCNC: 259 NG/ML DDU — HIGH
EGFR: 54 ML/MIN/1.73M2 — LOW
EOSINOPHIL # BLD AUTO: 0.32 K/UL — SIGNIFICANT CHANGE UP (ref 0–0.5)
EOSINOPHIL NFR BLD AUTO: 5.9 % — SIGNIFICANT CHANGE UP (ref 0–6)
GLUCOSE SERPL-MCNC: 109 MG/DL — HIGH (ref 70–99)
HCT VFR BLD CALC: 36.5 % — SIGNIFICANT CHANGE UP (ref 34.5–45)
HGB BLD-MCNC: 12.1 G/DL — SIGNIFICANT CHANGE UP (ref 11.5–15.5)
IMM GRANULOCYTES NFR BLD AUTO: 0.2 % — SIGNIFICANT CHANGE UP (ref 0–0.9)
LYMPHOCYTES # BLD AUTO: 2.17 K/UL — SIGNIFICANT CHANGE UP (ref 1–3.3)
LYMPHOCYTES # BLD AUTO: 39.7 % — SIGNIFICANT CHANGE UP (ref 13–44)
MAGNESIUM SERPL-MCNC: 2 MG/DL — SIGNIFICANT CHANGE UP (ref 1.6–2.6)
MCHC RBC-ENTMCNC: 31.5 PG — SIGNIFICANT CHANGE UP (ref 27–34)
MCHC RBC-ENTMCNC: 33.2 GM/DL — SIGNIFICANT CHANGE UP (ref 32–36)
MCV RBC AUTO: 95.1 FL — SIGNIFICANT CHANGE UP (ref 80–100)
MONOCYTES # BLD AUTO: 0.56 K/UL — SIGNIFICANT CHANGE UP (ref 0–0.9)
MONOCYTES NFR BLD AUTO: 10.2 % — SIGNIFICANT CHANGE UP (ref 2–14)
NEUTROPHILS # BLD AUTO: 2.35 K/UL — SIGNIFICANT CHANGE UP (ref 1.8–7.4)
NEUTROPHILS NFR BLD AUTO: 42.9 % — LOW (ref 43–77)
NT-PROBNP SERPL-SCNC: 121 PG/ML — SIGNIFICANT CHANGE UP (ref 0–450)
PLATELET # BLD AUTO: 277 K/UL — SIGNIFICANT CHANGE UP (ref 150–400)
POTASSIUM SERPL-MCNC: 4 MMOL/L — SIGNIFICANT CHANGE UP (ref 3.5–5.3)
POTASSIUM SERPL-SCNC: 4 MMOL/L — SIGNIFICANT CHANGE UP (ref 3.5–5.3)
PROT SERPL-MCNC: 6.4 GM/DL — SIGNIFICANT CHANGE UP (ref 6–8.3)
RBC # BLD: 3.84 M/UL — SIGNIFICANT CHANGE UP (ref 3.8–5.2)
RBC # FLD: 12.9 % — SIGNIFICANT CHANGE UP (ref 10.3–14.5)
SODIUM SERPL-SCNC: 143 MMOL/L — SIGNIFICANT CHANGE UP (ref 135–145)
TROPONIN I, HIGH SENSITIVITY RESULT: 4.6 NG/L — SIGNIFICANT CHANGE UP
WBC # BLD: 5.47 K/UL — SIGNIFICANT CHANGE UP (ref 3.8–10.5)
WBC # FLD AUTO: 5.47 K/UL — SIGNIFICANT CHANGE UP (ref 3.8–10.5)

## 2024-07-09 PROCEDURE — 80053 COMPREHEN METABOLIC PANEL: CPT

## 2024-07-09 PROCEDURE — 85025 COMPLETE CBC W/AUTO DIFF WBC: CPT

## 2024-07-09 PROCEDURE — 93005 ELECTROCARDIOGRAM TRACING: CPT

## 2024-07-09 PROCEDURE — 93010 ELECTROCARDIOGRAM REPORT: CPT

## 2024-07-09 PROCEDURE — 99285 EMERGENCY DEPT VISIT HI MDM: CPT

## 2024-07-09 PROCEDURE — 71045 X-RAY EXAM CHEST 1 VIEW: CPT | Mod: 26

## 2024-07-09 PROCEDURE — 85379 FIBRIN DEGRADATION QUANT: CPT

## 2024-07-09 PROCEDURE — 71275 CT ANGIOGRAPHY CHEST: CPT | Mod: 26,MC

## 2024-07-09 PROCEDURE — 71275 CT ANGIOGRAPHY CHEST: CPT | Mod: MC

## 2024-07-09 PROCEDURE — 83735 ASSAY OF MAGNESIUM: CPT

## 2024-07-09 PROCEDURE — 94640 AIRWAY INHALATION TREATMENT: CPT

## 2024-07-09 PROCEDURE — 83880 ASSAY OF NATRIURETIC PEPTIDE: CPT

## 2024-07-09 PROCEDURE — 84484 ASSAY OF TROPONIN QUANT: CPT

## 2024-07-09 PROCEDURE — 71045 X-RAY EXAM CHEST 1 VIEW: CPT

## 2024-07-09 PROCEDURE — 36415 COLL VENOUS BLD VENIPUNCTURE: CPT

## 2024-07-09 PROCEDURE — 99285 EMERGENCY DEPT VISIT HI MDM: CPT | Mod: 25

## 2024-07-09 RX ORDER — DOXYCYCLINE 100 MG/1
1 CAPSULE ORAL
Refills: 0 | DISCHARGE
Start: 2024-07-09

## 2024-07-09 RX ORDER — METHYLPREDNISOLONE ACETATE 20 MG/ML
0 VIAL (ML) INJECTION
Refills: 0 | DISCHARGE
Start: 2024-07-09

## 2024-07-10 VITALS
TEMPERATURE: 98 F | SYSTOLIC BLOOD PRESSURE: 120 MMHG | RESPIRATION RATE: 17 BRPM | HEART RATE: 56 BPM | OXYGEN SATURATION: 95 % | DIASTOLIC BLOOD PRESSURE: 95 MMHG

## 2024-07-10 RX ORDER — ALBUTEROL 90 MCG
2 AEROSOL REFILL (GRAM) INHALATION ONCE
Refills: 0 | Status: COMPLETED | OUTPATIENT
Start: 2024-07-10 | End: 2024-07-10

## 2024-07-10 RX ADMIN — Medication 2 PUFF(S): at 01:29

## 2024-07-11 ENCOUNTER — INPATIENT (INPATIENT)
Facility: HOSPITAL | Age: 79
LOS: 5 days | Discharge: HOME HEALTH | DRG: 202 | End: 2024-07-17
Attending: INTERNAL MEDICINE | Admitting: FAMILY MEDICINE
Payer: MEDICARE

## 2024-07-11 VITALS
HEART RATE: 79 BPM | RESPIRATION RATE: 18 BRPM | OXYGEN SATURATION: 96 % | WEIGHT: 98.11 LBS | HEIGHT: 60 IN | TEMPERATURE: 99 F | SYSTOLIC BLOOD PRESSURE: 114 MMHG | DIASTOLIC BLOOD PRESSURE: 84 MMHG

## 2024-07-11 DIAGNOSIS — Z98.890 OTHER SPECIFIED POSTPROCEDURAL STATES: Chronic | ICD-10-CM

## 2024-07-11 DIAGNOSIS — R06.02 SHORTNESS OF BREATH: ICD-10-CM

## 2024-07-11 LAB
ALBUMIN SERPL ELPH-MCNC: 3.7 G/DL — SIGNIFICANT CHANGE UP (ref 3.3–5)
ALP SERPL-CCNC: 76 U/L — SIGNIFICANT CHANGE UP (ref 40–120)
ALT FLD-CCNC: 17 U/L — SIGNIFICANT CHANGE UP (ref 12–78)
ANION GAP SERPL CALC-SCNC: 8 MMOL/L — SIGNIFICANT CHANGE UP (ref 5–17)
AST SERPL-CCNC: 19 U/L — SIGNIFICANT CHANGE UP (ref 15–37)
BASE EXCESS BLDV CALC-SCNC: 0.6 MMOL/L — SIGNIFICANT CHANGE UP (ref -2–3)
BASOPHILS # BLD AUTO: 0.03 K/UL — SIGNIFICANT CHANGE UP (ref 0–0.2)
BASOPHILS NFR BLD AUTO: 0.5 % — SIGNIFICANT CHANGE UP (ref 0–2)
BILIRUB SERPL-MCNC: 0.8 MG/DL — SIGNIFICANT CHANGE UP (ref 0.2–1.2)
BUN SERPL-MCNC: 17 MG/DL — SIGNIFICANT CHANGE UP (ref 7–23)
CALCIUM SERPL-MCNC: 9.2 MG/DL — SIGNIFICANT CHANGE UP (ref 8.5–10.1)
CHLORIDE SERPL-SCNC: 107 MMOL/L — SIGNIFICANT CHANGE UP (ref 96–108)
CO2 SERPL-SCNC: 24 MMOL/L — SIGNIFICANT CHANGE UP (ref 22–31)
CREAT SERPL-MCNC: 0.83 MG/DL — SIGNIFICANT CHANGE UP (ref 0.5–1.3)
D DIMER BLD IA.RAPID-MCNC: 351 NG/ML DDU — HIGH
EGFR: 73 ML/MIN/1.73M2 — SIGNIFICANT CHANGE UP
EOSINOPHIL # BLD AUTO: 0.05 K/UL — SIGNIFICANT CHANGE UP (ref 0–0.5)
EOSINOPHIL NFR BLD AUTO: 0.8 % — SIGNIFICANT CHANGE UP (ref 0–6)
GAS PNL BLDV: SIGNIFICANT CHANGE UP
GLUCOSE SERPL-MCNC: 105 MG/DL — HIGH (ref 70–99)
HCO3 BLDV-SCNC: 24 MMOL/L — SIGNIFICANT CHANGE UP (ref 22–29)
HCT VFR BLD CALC: 38.7 % — SIGNIFICANT CHANGE UP (ref 34.5–45)
HGB BLD-MCNC: 13.2 G/DL — SIGNIFICANT CHANGE UP (ref 11.5–15.5)
HIV 1 & 2 AB SERPL IA.RAPID: SIGNIFICANT CHANGE UP
IMM GRANULOCYTES NFR BLD AUTO: 0.2 % — SIGNIFICANT CHANGE UP (ref 0–0.9)
LYMPHOCYTES # BLD AUTO: 1.27 K/UL — SIGNIFICANT CHANGE UP (ref 1–3.3)
LYMPHOCYTES # BLD AUTO: 21 % — SIGNIFICANT CHANGE UP (ref 13–44)
MAGNESIUM SERPL-MCNC: 2.1 MG/DL — SIGNIFICANT CHANGE UP (ref 1.6–2.6)
MCHC RBC-ENTMCNC: 32 PG — SIGNIFICANT CHANGE UP (ref 27–34)
MCHC RBC-ENTMCNC: 34.1 GM/DL — SIGNIFICANT CHANGE UP (ref 32–36)
MCV RBC AUTO: 93.9 FL — SIGNIFICANT CHANGE UP (ref 80–100)
MONOCYTES # BLD AUTO: 0.44 K/UL — SIGNIFICANT CHANGE UP (ref 0–0.9)
MONOCYTES NFR BLD AUTO: 7.3 % — SIGNIFICANT CHANGE UP (ref 2–14)
NEUTROPHILS # BLD AUTO: 4.26 K/UL — SIGNIFICANT CHANGE UP (ref 1.8–7.4)
NEUTROPHILS NFR BLD AUTO: 70.2 % — SIGNIFICANT CHANGE UP (ref 43–77)
NT-PROBNP SERPL-SCNC: 281 PG/ML — SIGNIFICANT CHANGE UP (ref 0–450)
PCO2 BLDV: 36 MMHG — LOW (ref 39–42)
PH BLDV: 7.44 — HIGH (ref 7.32–7.43)
PLATELET # BLD AUTO: 321 K/UL — SIGNIFICANT CHANGE UP (ref 150–400)
PO2 BLDV: 76 MMHG — HIGH (ref 25–45)
POTASSIUM SERPL-MCNC: 4.1 MMOL/L — SIGNIFICANT CHANGE UP (ref 3.5–5.3)
POTASSIUM SERPL-SCNC: 4.1 MMOL/L — SIGNIFICANT CHANGE UP (ref 3.5–5.3)
PROT SERPL-MCNC: 7.3 GM/DL — SIGNIFICANT CHANGE UP (ref 6–8.3)
RBC # BLD: 4.12 M/UL — SIGNIFICANT CHANGE UP (ref 3.8–5.2)
RBC # FLD: 12.7 % — SIGNIFICANT CHANGE UP (ref 10.3–14.5)
SAO2 % BLDV: 97 % — HIGH (ref 67–88)
SODIUM SERPL-SCNC: 139 MMOL/L — SIGNIFICANT CHANGE UP (ref 135–145)
TROPONIN I, HIGH SENSITIVITY RESULT: 3.36 NG/L — SIGNIFICANT CHANGE UP
WBC # BLD: 6.06 K/UL — SIGNIFICANT CHANGE UP (ref 3.8–10.5)
WBC # FLD AUTO: 6.06 K/UL — SIGNIFICANT CHANGE UP (ref 3.8–10.5)

## 2024-07-11 PROCEDURE — 84100 ASSAY OF PHOSPHORUS: CPT

## 2024-07-11 PROCEDURE — 99285 EMERGENCY DEPT VISIT HI MDM: CPT | Mod: FS

## 2024-07-11 PROCEDURE — 36415 COLL VENOUS BLD VENIPUNCTURE: CPT

## 2024-07-11 PROCEDURE — 85027 COMPLETE CBC AUTOMATED: CPT

## 2024-07-11 PROCEDURE — 84484 ASSAY OF TROPONIN QUANT: CPT

## 2024-07-11 PROCEDURE — 84443 ASSAY THYROID STIM HORMONE: CPT

## 2024-07-11 PROCEDURE — 99223 1ST HOSP IP/OBS HIGH 75: CPT

## 2024-07-11 PROCEDURE — 93010 ELECTROCARDIOGRAM REPORT: CPT

## 2024-07-11 PROCEDURE — 80048 BASIC METABOLIC PNL TOTAL CA: CPT

## 2024-07-11 PROCEDURE — 71045 X-RAY EXAM CHEST 1 VIEW: CPT | Mod: 26

## 2024-07-11 PROCEDURE — 94640 AIRWAY INHALATION TREATMENT: CPT

## 2024-07-11 RX ORDER — ALPRAZOLAM 2 MG/1
0.25 TABLET ORAL EVERY 12 HOURS
Refills: 0 | Status: DISCONTINUED | OUTPATIENT
Start: 2024-07-11 | End: 2024-07-12

## 2024-07-11 RX ORDER — SERTRALINE HYDROCHLORIDE 100 MG/1
25 TABLET, FILM COATED ORAL DAILY
Refills: 0 | Status: DISCONTINUED | OUTPATIENT
Start: 2024-07-11 | End: 2024-07-17

## 2024-07-11 RX ORDER — ENOXAPARIN SODIUM 100 MG/ML
40 INJECTION SUBCUTANEOUS EVERY 24 HOURS
Refills: 0 | Status: DISCONTINUED | OUTPATIENT
Start: 2024-07-11 | End: 2024-07-17

## 2024-07-11 RX ORDER — ALBUTEROL 90 MCG
2 AEROSOL REFILL (GRAM) INHALATION EVERY 6 HOURS
Refills: 0 | Status: DISCONTINUED | OUTPATIENT
Start: 2024-07-11 | End: 2024-07-17

## 2024-07-11 RX ORDER — ACETAMINOPHEN 325 MG
650 TABLET ORAL EVERY 6 HOURS
Refills: 0 | Status: DISCONTINUED | OUTPATIENT
Start: 2024-07-11 | End: 2024-07-17

## 2024-07-11 RX ORDER — LEVOTHYROXINE SODIUM 25 MCG
50 TABLET ORAL DAILY
Refills: 0 | Status: DISCONTINUED | OUTPATIENT
Start: 2024-07-11 | End: 2024-07-17

## 2024-07-11 RX ORDER — ONDANSETRON HYDROCHLORIDE 2 MG/ML
4 INJECTION INTRAMUSCULAR; INTRAVENOUS EVERY 8 HOURS
Refills: 0 | Status: DISCONTINUED | OUTPATIENT
Start: 2024-07-11 | End: 2024-07-17

## 2024-07-11 RX ORDER — MAGNESIUM, ALUMINUM HYDROXIDE 400-400
30 TABLET,CHEWABLE ORAL EVERY 4 HOURS
Refills: 0 | Status: DISCONTINUED | OUTPATIENT
Start: 2024-07-11 | End: 2024-07-17

## 2024-07-11 RX ORDER — ATORVASTATIN CALCIUM 20 MG/1
20 TABLET, FILM COATED ORAL AT BEDTIME
Refills: 0 | Status: DISCONTINUED | OUTPATIENT
Start: 2024-07-11 | End: 2024-07-17

## 2024-07-11 RX ADMIN — ATORVASTATIN CALCIUM 20 MILLIGRAM(S): 20 TABLET, FILM COATED ORAL at 23:27

## 2024-07-11 RX ADMIN — Medication 3 MILLIGRAM(S): at 23:37

## 2024-07-11 RX ADMIN — ENOXAPARIN SODIUM 40 MILLIGRAM(S): 100 INJECTION SUBCUTANEOUS at 23:27

## 2024-07-11 NOTE — H&P ADULT - NSHPPHYSICALEXAM_GEN_ALL_CORE
PHYSICAL EXAM:  GENERAL: NAD, lying in bed comfortably  HEAD:  Atraumatic, Normocephalic  EYES: EOMI, PERRLA, conjunctiva and sclera clear  ENT: Moist mucous membranes  NECK: Supple, No JVD  CHEST/LUNG: Clear to auscultation bilaterally; Occasional crepts present.  Unlabored respirations  HEART: Regular rate and rhythm; No murmurs, rubs, or gallops  ABDOMEN: Bowel sounds present; Soft, Nontender, Nondistended. No hepatomegaly  EXTREMITIES:  2+ Peripheral Pulses, brisk capillary refill. No clubbing, cyanosis, or edema  NERVOUS SYSTEM:  Alert & Oriented X3, speech clear. No deficits   MSK: FROM all 4 extremities, full and equal strength  SKIN: No rashes or lesions

## 2024-07-11 NOTE — H&P ADULT - NSICDXPASTSURGICALHX_GEN_ALL_CORE_FT
PAST SURGICAL HISTORY:  H/O cardiac radiofrequency ablation      PAST SURGICAL HISTORY:  H/O cardiac radiofrequency ablation     Implantable loop recorder present

## 2024-07-11 NOTE — ED STATDOCS - CLINICAL SUMMARY MEDICAL DECISION MAKING FREE TEXT BOX
78-year-old female history of hypothyroidism presents the emergency department for shortness of breath.  Patient states that symptoms present for the last few days associated some right-sided chest discomfort no fevers no cough no leg swelling shortness of breath is worse laying flat.  No recent travel or surgeries.  Exam with diminished breath sounds at bases worse on the right no lower extremity swelling normal pulses will workup to rule out ACS CHF PE pneumonia pneumothorax check labs x-ray symptom control reassess. 78-year-old female history of hypothyroidism presents the emergency department for shortness of breath.  Patient states that symptoms present for the last few days associated some right-sided chest discomfort no fevers no cough no leg swelling shortness of breath is worse laying flat.  No recent travel or surgeries.  Exam with diminished breath sounds at bases worse on the right no lower extremity swelling normal pulses will workup to rule out ACS CHF PE pneumonia pneumothorax check labs x-ray symptom control reassess.    Patient is a 72 hour bounce back with worsening SOB and STEVEN. Spoke with hospitalist dr. Smith, will admit patient. ~Chadwick Reilly PA-C

## 2024-07-11 NOTE — H&P ADULT - NSICDXPASTMEDICALHX_GEN_ALL_CORE_FT
PAST MEDICAL HISTORY:  HTN (hypertension)     Hypothyroid      PAST MEDICAL HISTORY:  Anxiety and depression     HTN (hypertension)     Hypothyroid     Implantable loop recorder present

## 2024-07-11 NOTE — ED STATDOCS - PROGRESS NOTE DETAILS
Patient is a 72 hour bounce back with worsening SOB and STEVEN. Spoke with hospitalist dr. Smith, will admit patient. ~Chadwick Reilly PA-C PA: Patient is a 78-year-old female with PMHx of hypothyroidism, HTN, arrhythmia s/p ablation 5 years ago at [place], who presents to ED c/o STEVEN and SOB x 3 days. Patient was seen here 2 days ago for similar S&S, had a NEG CTA chest, dc'd home in stable condition. Patient returns to ER today c/o worsening SOB and STEVEN. Also c/o mild right sided CP.   ~Chadwick Reilly PA-C

## 2024-07-11 NOTE — H&P ADULT - NSICDXFAMILYHX_GEN_ALL_CORE_FT
FAMILY HISTORY:  No pertinent family history in first degree relatives     FAMILY HISTORY:  Father  Still living? Unknown  FHx: Parkinson's disease, Age at diagnosis: Age Unknown

## 2024-07-11 NOTE — H&P ADULT - NSHPLABSRESULTS_GEN_ALL_CORE
LABS:                        13.2   6.06  )-----------( 321      ( 11 Jul 2024 14:54 )             38.7     07-11    139  |  107  |  17  ----------------------------<  105<H>  4.1   |  24  |  0.83    Ca    9.2      11 Jul 2024 14:54  Mg     2.1     07-11    TPro  7.3  /  Alb  3.7  /  TBili  0.8  /  DBili  x   /  AST  19  /  ALT  17  /  AlkPhos  76  07-11        < from: CT Angio Chest PE Protocol w/ IV Cont (07.09.24 @ 23:31) >    IMPRESSION: No pulmonary embolism.    Noairspace consolidation.    --- End of Report ---    < end of copied text >

## 2024-07-11 NOTE — ED STATDOCS - PHYSICAL EXAMINATION
Constitutional: NAD AAOx3  Eyes: PERRLA EOMI  Head: Normocephalic atraumatic  Mouth: MMM  Cardiac: regular rate, normal peripheral pulses, no LE swelling  Resp: diminished breath sounds at bases worse on the R  GI: Abd s/nt/nd  Neuro: grossly normal and intact  Skin: No visible rashes attending: Constitutional: NAD AAOx3  Eyes: PERRLA EOMI  Head: Normocephalic atraumatic  Mouth: MMM  Cardiac: regular rate, normal peripheral pulses, no LE swelling  Resp: diminished breath sounds at bases worse on the R  GI: Abd s/nt/nd  Neuro: grossly normal and intact  Skin: No visible rashes

## 2024-07-11 NOTE — ED STATDOCS - OBJECTIVE STATEMENT
78-year-old female history of hypothyroidism presents the emergency department for shortness of breath.  Patient states that symptoms present for the last few days associated some right-sided chest discomfort no fevers no cough no leg swelling shortness of breath is worse laying flat.  No recent travel or surgeries.  Exam with diminished breath sounds at bases worse on the right no lower extremity swelling normal pulses will workup to rule out ACS CHF PE pneumonia pneumothorax check labs x-ray symptom control reassess.

## 2024-07-11 NOTE — ED STATDOCS - ATTENDING APP SHARED VISIT CONTRIBUTION OF CARE
I, Ezra Fairchild MD, personally saw the patient with ACP.  I have personally performed a face to face diagnostic evaluation on this patient.   The initial assessment was performed by myself and then the patient was handed off to the ACP. The patient was followed and re-evaluated by the ACP. All labs, imaging and procedures were evaluated and performed by the ACP and I was available for consultation if any questions in the patients care came up.   I personally made/approved the management plan and take responsibility for the patient management.

## 2024-07-11 NOTE — H&P ADULT - ASSESSMENT
The patient is a 78y year old Female with significant PMH of Hypothyroidism, dyslipidemia, Loop recorder in place (patient has no much information about its placement), Anxiety and depression, and others presented in ED with c/o shortness of breath for last few days.  Sometimes, she also has right sided chest discomfort. Otherwise, she denies fever, chills, palpitation, cough, leg swelling or leg claudication, N/V, abdominal pain, diarrhea or dysuria. She has not seen her PCP for over a year.  She endorses that she has not been taking her any medications for last couple weeks. She denies any long distance travel.  She also denies h/o COPD or asthma.  Denies smoking, alcohol or substance abuse. She does not know more about her loop recorder when and where was it placed. She was seen in [facility] just 2 days ago for the same on 07/09, and her D-dimer was mildly elevated but CT angio chest was negative for any acute PE or consolidation but it showed subsegmental atelectasis, scarring and calcified granulomas.      # Dyspnea on exertion, unclear etiology.  -Possible COPD, ILD, Pulmonary fibrosis or ACS.  She is not hypoxic and saturating well on RA.  -CT angio negative for PE and consolidation, but showed subsegmental atelectasis, scarring and calcified granulomas.  -Troponin and BNP negative.   -Admit to medical floor.  -Albuterol inhaler prn.  -May need PFT as outpatient.  -Pulmonary consult placed.    # Dyslipidemia with h/o Loop recorder in place   -Continue her Atorvastatin.    # Hypothyroidism.  -On Levothyroxine.  -Will get TSH level.    # Anxiety and depression.  -On Sertraline and Alprazolam.    # DVT prophylaxis: Lovenox sq daily.

## 2024-07-11 NOTE — H&P ADULT - HISTORY OF PRESENT ILLNESS
Chief Complaint: difficulty breathing.    · Chief Complaint: The patient is a 78y year old Female complaining of difficulty breathing.  · HPI Objective Statement: 78-year-old female history of hypothyroidism presents the emergency department for shortness of breath.  Patient states that symptoms present for the last few days associated some right-sided chest discomfort no fevers no cough no leg swelling shortness of breath is worse laying flat.  No recent travel or surgeries.  Exam with diminished breath sounds at bases worse on the right no lower extremity swelling normal pulses will workup to rule out ACS CHF PE pneumonia pneumothorax check labs x-ray symptom control reassess.   Chief Complaint: difficulty breathing.    The patient is a 78y year old Female with significant PMH of Hypothyroidism, dyslipidemia, Loop recorder in place (patient has no much information about its placement), Anxiety and depression, and others presented in ED with c/o shortness of breath for last few days.  Sometimes, she also has right sided chest discomfort. Otherwise, she denies fever, chills, palpitation, cough, leg swelling or leg claudication, N/V, abdominal pain, diarrhea or dysuria. She has not seen her PCP for over a year.  She endorses that she has not been taking her any medications for last couple weeks. She denies any long distance travel.  She also denies h/o COPD or asthma.  Denies smoking, alcohol or substance abuse. She does not know more about her loop recorder when and where was it placed. She was seen in [facility] just 2 days ago for the same on , and her D-dimer was mildly elevated but CT angio chest was negative for any acute PE or consolidation but it showed subsegmental atelectasis, scarring and calcified granulomas.  Today her D-dimer is 351 which was 259 just 2 days ago.   Other sig labs:  CBC and CMP wnl. Troponin x1, Pro-BNP, Mg and HIV-1/2 Ab negative.  CXR negative for any acute infiltrates.  EK bpm in NSR and no acute ST-T changes.

## 2024-07-12 DIAGNOSIS — Z95.818 PRESENCE OF OTHER CARDIAC IMPLANTS AND GRAFTS: Chronic | ICD-10-CM

## 2024-07-12 LAB
ADD ON TEST-SPECIMEN IN LAB: SIGNIFICANT CHANGE UP
ANION GAP SERPL CALC-SCNC: 5 MMOL/L — SIGNIFICANT CHANGE UP (ref 5–17)
BUN SERPL-MCNC: 20 MG/DL — SIGNIFICANT CHANGE UP (ref 7–23)
CALCIUM SERPL-MCNC: 8.6 MG/DL — SIGNIFICANT CHANGE UP (ref 8.5–10.1)
CHLORIDE SERPL-SCNC: 111 MMOL/L — HIGH (ref 96–108)
CO2 SERPL-SCNC: 27 MMOL/L — SIGNIFICANT CHANGE UP (ref 22–31)
CREAT SERPL-MCNC: 1.14 MG/DL — SIGNIFICANT CHANGE UP (ref 0.5–1.3)
EGFR: 50 ML/MIN/1.73M2 — LOW
GLUCOSE SERPL-MCNC: 118 MG/DL — HIGH (ref 70–99)
HCT VFR BLD CALC: 36.2 % — SIGNIFICANT CHANGE UP (ref 34.5–45)
HCV AB S/CO SERPL IA: 0.05 S/CO — SIGNIFICANT CHANGE UP (ref 0–0.99)
HCV AB SERPL-IMP: SIGNIFICANT CHANGE UP
HGB BLD-MCNC: 12 G/DL — SIGNIFICANT CHANGE UP (ref 11.5–15.5)
MCHC RBC-ENTMCNC: 31.4 PG — SIGNIFICANT CHANGE UP (ref 27–34)
MCHC RBC-ENTMCNC: 33.1 GM/DL — SIGNIFICANT CHANGE UP (ref 32–36)
MCV RBC AUTO: 94.8 FL — SIGNIFICANT CHANGE UP (ref 80–100)
PHOSPHATE SERPL-MCNC: 2.7 MG/DL — SIGNIFICANT CHANGE UP (ref 2.5–4.5)
PLATELET # BLD AUTO: 287 K/UL — SIGNIFICANT CHANGE UP (ref 150–400)
POTASSIUM SERPL-MCNC: 4.3 MMOL/L — SIGNIFICANT CHANGE UP (ref 3.5–5.3)
POTASSIUM SERPL-SCNC: 4.3 MMOL/L — SIGNIFICANT CHANGE UP (ref 3.5–5.3)
RBC # BLD: 3.82 M/UL — SIGNIFICANT CHANGE UP (ref 3.8–5.2)
RBC # FLD: 13 % — SIGNIFICANT CHANGE UP (ref 10.3–14.5)
SODIUM SERPL-SCNC: 143 MMOL/L — SIGNIFICANT CHANGE UP (ref 135–145)
TROPONIN I, HIGH SENSITIVITY RESULT: 4.92 NG/L — SIGNIFICANT CHANGE UP
TSH SERPL-MCNC: 1.41 UU/ML — SIGNIFICANT CHANGE UP (ref 0.34–4.82)
WBC # BLD: 6.48 K/UL — SIGNIFICANT CHANGE UP (ref 3.8–10.5)
WBC # FLD AUTO: 6.48 K/UL — SIGNIFICANT CHANGE UP (ref 3.8–10.5)

## 2024-07-12 PROCEDURE — 93285 PRGRMG DEV EVAL SCRMS IP: CPT | Mod: 26

## 2024-07-12 PROCEDURE — 99233 SBSQ HOSP IP/OBS HIGH 50: CPT

## 2024-07-12 RX ORDER — ATORVASTATIN CALCIUM 20 MG/1
1 TABLET, FILM COATED ORAL
Refills: 0 | DISCHARGE

## 2024-07-12 RX ORDER — PREDNISONE 10 MG/1
40 TABLET ORAL DAILY
Refills: 0 | Status: DISCONTINUED | OUTPATIENT
Start: 2024-07-12 | End: 2024-07-15

## 2024-07-12 RX ORDER — GUAIFENESIN/DEXTROMETHORPHAN 100-10MG/5
10 LIQUID (ML) ORAL
Refills: 0 | Status: DISCONTINUED | OUTPATIENT
Start: 2024-07-12 | End: 2024-07-17

## 2024-07-12 RX ORDER — PANTOPRAZOLE SODIUM 40 MG/10ML
40 INJECTION, POWDER, FOR SOLUTION INTRAVENOUS
Refills: 0 | Status: DISCONTINUED | OUTPATIENT
Start: 2024-07-12 | End: 2024-07-17

## 2024-07-12 RX ORDER — ALPRAZOLAM 2 MG/1
0.25 TABLET ORAL
Refills: 0 | Status: DISCONTINUED | OUTPATIENT
Start: 2024-07-12 | End: 2024-07-13

## 2024-07-12 RX ORDER — MEMANTINE HYDROCHLORIDE 7 MG/1
1 CAPSULE, EXTENDED RELEASE ORAL
Refills: 0 | DISCHARGE

## 2024-07-12 RX ADMIN — Medication 650 MILLIGRAM(S): at 21:03

## 2024-07-12 RX ADMIN — PREDNISONE 40 MILLIGRAM(S): 10 TABLET ORAL at 13:02

## 2024-07-12 RX ADMIN — ATORVASTATIN CALCIUM 20 MILLIGRAM(S): 20 TABLET, FILM COATED ORAL at 21:03

## 2024-07-12 RX ADMIN — SERTRALINE HYDROCHLORIDE 25 MILLIGRAM(S): 100 TABLET, FILM COATED ORAL at 09:42

## 2024-07-12 RX ADMIN — ALPRAZOLAM 0.25 MILLIGRAM(S): 2 TABLET ORAL at 11:37

## 2024-07-12 RX ADMIN — ENOXAPARIN SODIUM 40 MILLIGRAM(S): 100 INJECTION SUBCUTANEOUS at 22:01

## 2024-07-12 RX ADMIN — PANTOPRAZOLE SODIUM 40 MILLIGRAM(S): 40 INJECTION, POWDER, FOR SOLUTION INTRAVENOUS at 13:02

## 2024-07-12 RX ADMIN — Medication 50 MICROGRAM(S): at 09:42

## 2024-07-12 RX ADMIN — Medication 10 MILLILITER(S): at 15:03

## 2024-07-12 RX ADMIN — Medication 3 MILLIGRAM(S): at 21:03

## 2024-07-12 RX ADMIN — ALPRAZOLAM 0.25 MILLIGRAM(S): 2 TABLET ORAL at 21:03

## 2024-07-12 NOTE — CONSULT NOTE ADULT - ASSESSMENT
The patient is a 78y year old Female with significant PMH of Hypothyroidism, dyslipidemia, Loop recorder in place (patient has not much information about its placement), Anxiety and depression,  presented in ED with c/o shortness of breath for last few days.  Sometimes, she also has right sided chest discomfort. Otherwise, she denies fever, chills, palpitation, cough, leg swelling or leg claudication, N/V, abdominal pain, diarrhea or dysuria. She has not seen her PCP for over a year.  She endorses that she has not been taking her any medications for last couple weeks. She denies any long distance travel.  She also denies h/o COPD or asthma.  Denies smoking, alcohol or substance abuse. She does not know more about her loop recorder when and where was it placed. She was seen in [facility] just 2 days ago for the same on 07/09, and her D-dimer was mildly elevated but CT angio chest was negative for any acute PE or consolidation but it showed subsegmental atelectasis, scarring and calcified granulomas.      PROBLEMS:    Dyspnea on exertion-likely ASTHMATICBRONCHITIS with anxiety component-She is not hypoxic and saturating 100% on RA.  Subsegmental atelectasis, scarring and calcified granulomas  Dyslipidemia with h/o Loop recorder in place  Hypothyroidism  Anxiety and depression.    PLAN:    Albuterol inhaler prn.  May need PFT as outpatient.  Trial of po prednisone  Robitussin  Xanax bid  EP consult for ILR interrogation  Anxiety and depression-On Sertraline and Alprazolam.  DVT prophylaxis: Lovenox sq daily

## 2024-07-12 NOTE — CONSULT NOTE ADULT - SUBJECTIVE AND OBJECTIVE BOX
HPI:  Chief Complaint: difficulty breathing.    The patient is a 78y year old Female with significant PMH of Hypothyroidism, dyslipidemia, Loop recorder in place (patient has no much information about its placement), Anxiety and depression, and others presented in ED with c/o shortness of breath for last few days.  Sometimes, she also has right sided chest discomfort. Otherwise, she denies fever, chills, palpitation, cough, leg swelling or leg claudication, N/V, abdominal pain, diarrhea or dysuria. She has not seen her PCP for over a year.  She endorses that she has not been taking her any medications for last couple weeks. She denies any long distance travel.  She also denies h/o COPD or asthma.  Denies smoking, alcohol or substance abuse. She does not know more about her loop recorder when and where was it placed. She was seen in [facility] just 2 days ago for the same on , and her D-dimer was mildly elevated but CT angio chest was negative for any acute PE or consolidation but it showed subsegmental atelectasis, scarring and calcified granulomas.  Today her D-dimer is 351 which was 259 just 2 days ago.   Other sig labs:  CBC and CMP wnl. Troponin x1, Pro-BNP, Mg and HIV-1/2 Ab negative.  CXR negative for any acute infiltrates.  EK bpm in NSR and no acute ST-T changes. (2024 19:47)      PAST MEDICAL & SURGICAL HISTORY:  HTN (hypertension)      Hypothyroid      Anxiety and depression      Implantable loop recorder present      H/O cardiac radiofrequency ablation      Implantable loop recorder present          Home Medications:  acetaminophen 325 mg oral tablet: 2 tab(s) orally every 6 hours As needed Mild Pain (1 - 3) (13 Mar 2024 14:10)  ALPRAZolam 0.25 mg oral tablet: 1 tab(s) orally every 12 hours As needed anxiety (13 Mar 2024 14:10)  aluminum hydroxide-magnesium hydroxide 200 mg-200 mg/5 mL oral suspension: 30 milliliter(s) orally every 4 hours As needed Dyspepsia (13 Mar 2024 14:10)  atorvastatin 10 mg oral tablet: 2 tab(s) orally once a day *** on hold per daughter*** (08 Mar 2024 18:36)  donepezil 10 mg oral tablet: 1 tab(s) orally once a day (08 Mar 2024 18:37)  levothyroxine 50 mcg (0.05 mg) oral tablet: 1 tab(s) orally once a day (08 Mar 2024 18:38)  meclizine 12.5 mg oral tablet: 1 tab(s) orally every 8 hours as needed for  dizziness (13 Mar 2024 14:10)  sertraline 25 mg oral tablet: 1 tab(s) orally once a day (08 Mar 2024 18:38)      MEDICATIONS  (STANDING):  atorvastatin 20 milliGRAM(s) Oral at bedtime  enoxaparin Injectable 40 milliGRAM(s) SubCutaneous every 24 hours  levothyroxine 50 MICROGram(s) Oral daily  sertraline 25 milliGRAM(s) Oral daily    MEDICATIONS  (PRN):  acetaminophen     Tablet .. 650 milliGRAM(s) Oral every 6 hours PRN Temp greater or equal to 38C (100.4F), Mild Pain (1 - 3)  albuterol    90 MICROgram(s) HFA Inhaler 2 Puff(s) Inhalation every 6 hours PRN Shortness of Breath and/or Wheezing  ALPRAZolam 0.25 milliGRAM(s) Oral every 12 hours PRN anxiety  aluminum hydroxide/magnesium hydroxide/simethicone Suspension 30 milliLiter(s) Oral every 4 hours PRN Dyspepsia  melatonin 3 milliGRAM(s) Oral at bedtime PRN Insomnia  ondansetron Injectable 4 milliGRAM(s) IV Push every 8 hours PRN Nausea and/or Vomiting      Allergies    codeine (Unknown)  penicillins (Unknown)  morphine (Unknown)    Intolerances        SOCIAL HISTORY: Denies tobacco, etoh abuse or illicit drug use    FAMILY HISTORY:  FHx: Parkinson's disease (Father)        Vital Signs Last 24 Hrs  T(C): 36.8 (2024 09:47), Max: 37.1 (2024 13:50)  T(F): 98.2 (2024 09:47), Max: 98.8 (2024 08:35)  HR: 70 (2024 09:47) (61 - 79)  BP: 106/75 (2024 09:47) (104/64 - 133/76)  BP(mean): 92 (2024 17:51) (85 - 92)  RR: 18 (2024 09:47) (14 - 18)  SpO2: 95% (2024 09:47) (95% - 99%)    Parameters below as of 2024 09:47  Patient On (Oxygen Delivery Method): room air            REVIEW OF SYSTEMS:    CONSTITUTIONAL:  As per HPI.  HEENT:  Eyes:  No diplopia or blurred vision. ENT:  No earache, sore throat or runny nose.  CARDIOVASCULAR:  No pressure, squeezing, tightness, heaviness or aching about the chest, neck, axilla or epigastrium.  RESPIRATORY:  No cough, shortness of breath, PND or orthopnea.  GASTROINTESTINAL:  No nausea, vomiting or diarrhea.  GENITOURINARY:  No dysuria, frequency or urgency.  MUSCULOSKELETAL:  As per HPI.  SKIN:  No change in skin, hair or nails.  NEUROLOGIC:  No paresthesias, fasciculations, seizures or weakness.  PSYCHIATRIC:  No disorder of thought or mood.  ENDOCRINE:  No heat or cold intolerance, polyuria or polydipsia.  HEMATOLOGICAL:  No easy bruising or bleedings:  .     PHYSICAL EXAMINATION:    GENERAL APPEARANCE:  Pt. is not currently dyspneic, in no distress. Pt. is alert, oriented, and pleasant.  HEENT:  Pupils are normal and react normally. No icterus. Mucous membranes well colored.  NECK:  Supple. No lymphadenopathy. Jugular venous pressure not elevated. Carotids equal.   HEART:   The cardiac impulse has a normal quality. Regular. Normal S1 and S2. There are no murmurs, rubs or gallops noted  CHEST:  Chest is clear to auscultation. Normal respiratory effort.  ABDOMEN:  Soft and nontender.   EXTREMITIES:  There is no cyanosis, clubbing or edema.   SKIN:  No rash or significant lesions are noted.    LABS:                        12.0   6.48  )-----------( 287      ( 2024 07:06 )             36.2     07-12    143  |  111<H>  |  20  ----------------------------<  118<H>  4.3   |  27  |  1.14    Ca    8.6      2024 07:06  Mg     2.1     07-11    TPro  7.3  /  Alb  3.7  /  TBili  0.8  /  DBili  x   /  AST  19  /  ALT  17  /  AlkPhos  76  07-11    LIVER FUNCTIONS - ( 2024 14:54 )  Alb: 3.7 g/dL / Pro: 7.3 gm/dL / ALK PHOS: 76 U/L / ALT: 17 U/L / AST: 19 U/L / GGT: x                 Urinalysis Basic - ( 2024 07:06 )    Color: x / Appearance: x / SG: x / pH: x  Gluc: 118 mg/dL / Ketone: x  / Bili: x / Urobili: x   Blood: x / Protein: x / Nitrite: x   Leuk Esterase: x / RBC: x / WBC x   Sq Epi: x / Non Sq Epi: x / Bacteria: x            RADIOLOGY & ADDITIONAL STUDIES:        HPI:    78y year old Female with significant PMH of Hypothyroidism, dyslipidemia, Loop recorder in place (patient has no much information about its placement), Anxiety and depression, and others presented in ED with c/o shortness of breath for last few days.  Sometimes, she also has right sided chest discomfort. Otherwise, she denies fever, chills, palpitation, cough, leg swelling or leg claudication, N/V, abdominal pain, diarrhea or dysuria. She has not seen her PCP for over a year.  She endorses that she has not been taking her any medications for last couple weeks. She denies any long distance travel.  She also denies h/o COPD or asthma.  Denies smoking, alcohol or substance abuse. She does not know more about her loop recorder when and where was it placed. She was seen in ED just 2 days ago for the same on , and her D-dimer was mildly elevated but CT angio chest was negative for any acute PE or consolidation but it showed subsegmental atelectasis, scarring and calcified granulomas.  Today her D-dimer is 351 which was 259 just 2 days ago. Other sig labs:  CBC and CMP wnl. Troponin x1, Pro-BNP, Mg and HIV-1/2 Ab negative. CXR negative for any acute infiltrates. EK bpm in NSR and no acute ST-T changes. pat seen for pulmonary eval, with congested cough with episode of sob with RA sat 100%, no previous h/o lung disease.      PAST MEDICAL & SURGICAL HISTORY:  HTN (hypertension)      Hypothyroid      Anxiety and depression      Implantable loop recorder present      H/O cardiac radiofrequency ablation      Implantable loop recorder present          Home Medications:  acetaminophen 325 mg oral tablet: 2 tab(s) orally every 6 hours As needed Mild Pain (1 - 3) (13 Mar 2024 14:10)  ALPRAZolam 0.25 mg oral tablet: 1 tab(s) orally every 12 hours As needed anxiety (13 Mar 2024 14:10)  aluminum hydroxide-magnesium hydroxide 200 mg-200 mg/5 mL oral suspension: 30 milliliter(s) orally every 4 hours As needed Dyspepsia (13 Mar 2024 14:10)  atorvastatin 10 mg oral tablet: 2 tab(s) orally once a day *** on hold per daughter*** (08 Mar 2024 18:36)  donepezil 10 mg oral tablet: 1 tab(s) orally once a day (08 Mar 2024 18:37)  levothyroxine 50 mcg (0.05 mg) oral tablet: 1 tab(s) orally once a day (08 Mar 2024 18:38)  meclizine 12.5 mg oral tablet: 1 tab(s) orally every 8 hours as needed for  dizziness (13 Mar 2024 14:10)  sertraline 25 mg oral tablet: 1 tab(s) orally once a day (08 Mar 2024 18:38)      MEDICATIONS  (STANDING):  atorvastatin 20 milliGRAM(s) Oral at bedtime  enoxaparin Injectable 40 milliGRAM(s) SubCutaneous every 24 hours  levothyroxine 50 MICROGram(s) Oral daily  sertraline 25 milliGRAM(s) Oral daily    MEDICATIONS  (PRN):  acetaminophen     Tablet .. 650 milliGRAM(s) Oral every 6 hours PRN Temp greater or equal to 38C (100.4F), Mild Pain (1 - 3)  albuterol    90 MICROgram(s) HFA Inhaler 2 Puff(s) Inhalation every 6 hours PRN Shortness of Breath and/or Wheezing  ALPRAZolam 0.25 milliGRAM(s) Oral every 12 hours PRN anxiety  aluminum hydroxide/magnesium hydroxide/simethicone Suspension 30 milliLiter(s) Oral every 4 hours PRN Dyspepsia  melatonin 3 milliGRAM(s) Oral at bedtime PRN Insomnia  ondansetron Injectable 4 milliGRAM(s) IV Push every 8 hours PRN Nausea and/or Vomiting      Allergies    codeine (Unknown)  penicillins (Unknown)  morphine (Unknown)    Intolerances        SOCIAL HISTORY: Denies tobacco, etoh abuse or illicit drug use    FAMILY HISTORY:  FHx: Parkinson's disease (Father)        Vital Signs Last 24 Hrs  T(C): 36.8 (2024 09:47), Max: 37.1 (2024 13:50)  T(F): 98.2 (2024 09:47), Max: 98.8 (2024 08:35)  HR: 70 (2024 09:47) (61 - 79)  BP: 106/75 (2024 09:47) (104/64 - 133/76)  BP(mean): 92 (2024 17:51) (85 - 92)  RR: 18 (2024 09:47) (14 - 18)  SpO2: 95% (2024 09:47) (95% - 99%)    Parameters below as of 2024 09:47  Patient On (Oxygen Delivery Method): room air            REVIEW OF SYSTEMS:    CONSTITUTIONAL:  As per HPI.  HEENT:  Eyes:  No diplopia or blurred vision. ENT:  No earache, sore throat or runny nose.  CARDIOVASCULAR:  No pressure, squeezing, tightness, heaviness or aching about the chest, neck, axilla or epigastrium.  RESPIRATORY:  No cough, shortness of breath, PND or orthopnea.  GASTROINTESTINAL:  No nausea, vomiting or diarrhea.  GENITOURINARY:  No dysuria, frequency or urgency.  MUSCULOSKELETAL:  As per HPI.  SKIN:  No change in skin, hair or nails.  NEUROLOGIC:  No paresthesias, fasciculations, seizures or weakness.  PSYCHIATRIC:  No disorder of thought or mood.  ENDOCRINE:  No heat or cold intolerance, polyuria or polydipsia.  HEMATOLOGICAL:  No easy bruising or bleedings:  .     PHYSICAL EXAMINATION:    GENERAL APPEARANCE:  Pt. is not currently dyspneic, in no distress. Pt. is alert, oriented, and pleasant.  HEENT:  Pupils are normal and react normally. No icterus. Mucous membranes well colored.  NECK:  Supple. No lymphadenopathy. Jugular venous pressure not elevated. Carotids equal.   HEART:   The cardiac impulse has a normal quality. Regular. Normal S1 and S2. There are no murmurs, rubs or gallops noted  CHEST:  Chest rhonchi to auscultation. Normal respiratory effort.  ABDOMEN:  Soft and nontender.   EXTREMITIES:  There is no cyanosis, clubbing or edema.   SKIN:  No rash or significant lesions are noted.    LABS:                        12.0   6.48  )-----------( 287      ( 2024 07:06 )             36.2     07-12    143  |  111<H>  |  20  ----------------------------<  118<H>  4.3   |  27  |  1.14    Ca    8.6      2024 07:06  Mg     2.1     07-11    TPro  7.3  /  Alb  3.7  /  TBili  0.8  /  DBili  x   /  AST  19  /  ALT  17  /  AlkPhos  76  07-11    LIVER FUNCTIONS - ( 2024 14:54 )  Alb: 3.7 g/dL / Pro: 7.3 gm/dL / ALK PHOS: 76 U/L / ALT: 17 U/L / AST: 19 U/L / GGT: x                 Urinalysis Basic - ( 2024 07:06 )    Color: x / Appearance: x / SG: x / pH: x  Gluc: 118 mg/dL / Ketone: x  / Bili: x / Urobili: x   Blood: x / Protein: x / Nitrite: x   Leuk Esterase: x / RBC: x / WBC x   Sq Epi: x / Non Sq Epi: x / Bacteria: x            RADIOLOGY & ADDITIONAL STUDIES:     CT Angio Chest PE Protocol w/ IV Cont (24 @ 23:31) >  IMPRESSION: No pulmonary embolism.    Noairspace consolidation.

## 2024-07-12 NOTE — PHARMACOTHERAPY INTERVENTION NOTE - COMMENTS
Medication reconciliation completed.  Patient was unable to provide medication information, spoke to daughter/HCP Nona ([phone]) and they provided current medication list; confirmed with Dr. [facility].

## 2024-07-12 NOTE — PATIENT PROFILE ADULT - FUNCTIONAL ASSESSMENT - BASIC MOBILITY 6.
4-calculated by average/Not able to assess (calculate score using [facility] averaging method)

## 2024-07-12 NOTE — PATIENT PROFILE ADULT - FALL HARM RISK - HARM RISK INTERVENTIONS

## 2024-07-13 PROCEDURE — 99232 SBSQ HOSP IP/OBS MODERATE 35: CPT

## 2024-07-13 RX ORDER — ALPRAZOLAM 2 MG/1
0.25 TABLET ORAL ONCE
Refills: 0 | Status: DISCONTINUED | OUTPATIENT
Start: 2024-07-13 | End: 2024-07-13

## 2024-07-13 RX ORDER — ALPRAZOLAM 2 MG/1
0.5 TABLET ORAL
Refills: 0 | Status: DISCONTINUED | OUTPATIENT
Start: 2024-07-13 | End: 2024-07-15

## 2024-07-13 RX ADMIN — ALPRAZOLAM 0.25 MILLIGRAM(S): 2 TABLET ORAL at 13:27

## 2024-07-13 RX ADMIN — SERTRALINE HYDROCHLORIDE 25 MILLIGRAM(S): 100 TABLET, FILM COATED ORAL at 09:22

## 2024-07-13 RX ADMIN — Medication 3 MILLIGRAM(S): at 21:02

## 2024-07-13 RX ADMIN — PREDNISONE 40 MILLIGRAM(S): 10 TABLET ORAL at 09:22

## 2024-07-13 RX ADMIN — Medication 650 MILLIGRAM(S): at 15:59

## 2024-07-13 RX ADMIN — Medication 650 MILLIGRAM(S): at 16:50

## 2024-07-13 RX ADMIN — ALPRAZOLAM 0.25 MILLIGRAM(S): 2 TABLET ORAL at 09:22

## 2024-07-13 RX ADMIN — ALPRAZOLAM 0.5 MILLIGRAM(S): 2 TABLET ORAL at 21:03

## 2024-07-13 RX ADMIN — ENOXAPARIN SODIUM 40 MILLIGRAM(S): 100 INJECTION SUBCUTANEOUS at 22:02

## 2024-07-13 RX ADMIN — PANTOPRAZOLE SODIUM 40 MILLIGRAM(S): 40 INJECTION, POWDER, FOR SOLUTION INTRAVENOUS at 06:02

## 2024-07-13 RX ADMIN — ATORVASTATIN CALCIUM 20 MILLIGRAM(S): 20 TABLET, FILM COATED ORAL at 21:02

## 2024-07-13 RX ADMIN — Medication 50 MICROGRAM(S): at 09:22

## 2024-07-13 NOTE — DIETITIAN INITIAL EVALUATION ADULT - PERTINENT MEDS FT
MEDICATIONS  (STANDING):  ALPRAZolam 0.25 milliGRAM(s) Oral two times a day  atorvastatin 20 milliGRAM(s) Oral at bedtime  enoxaparin Injectable 40 milliGRAM(s) SubCutaneous every 24 hours  levothyroxine 50 MICROGram(s) Oral daily  pantoprazole    Tablet 40 milliGRAM(s) Oral before breakfast  predniSONE   Tablet 40 milliGRAM(s) Oral daily  sertraline 25 milliGRAM(s) Oral daily    MEDICATIONS  (PRN):  acetaminophen     Tablet .. 650 milliGRAM(s) Oral every 6 hours PRN Temp greater or equal to 38C (100.4F), Mild Pain (1 - 3)  albuterol    90 MICROgram(s) HFA Inhaler 2 Puff(s) Inhalation every 6 hours PRN Shortness of Breath and/or Wheezing  aluminum hydroxide/magnesium hydroxide/simethicone Suspension 30 milliLiter(s) Oral every 4 hours PRN Dyspepsia  guaifenesin/dextromethorphan Oral Liquid 10 milliLiter(s) Oral four times a day PRN Cough  melatonin 3 milliGRAM(s) Oral at bedtime PRN Insomnia  ondansetron Injectable 4 milliGRAM(s) IV Push every 8 hours PRN Nausea and/or Vomiting

## 2024-07-13 NOTE — DIETITIAN INITIAL EVALUATION ADULT - ADD RECOMMEND
1) Liberalize diet to regular to maximize caloric and nutrient intake. Encourage protein-rich foods, maximize food preferences  2) Will add Ensure + HP shake BID to optimize nutritional needs (provides 350 kcal, 20g protein/ shake)  3) Monitor bowel movements, if no BM for >3 days, consider implementing bowel regimen.  4) Consider adding thiamine 100 mg daily 2/2 poor PO intake/ malnutrition   5) Obtain weekly wt to track/trend changes  6) Consider to obtain vitamin D 25OH level to assess nutriture  7) Confirm goals of care regarding nutrition support  RD will continue to monitor PO intake, labs, hydration, and wt prn.

## 2024-07-13 NOTE — DIETITIAN INITIAL EVALUATION ADULT - PERTINENT LABORATORY DATA
07-12    143  |  111<H>  |  20  ----------------------------<  118<H>  4.3   |  27  |  1.14    Ca    8.6      12 Jul 2024 07:06  Phos  2.7     07-12  Mg     2.1     07-11    TPro  7.3  /  Alb  3.7  /  TBili  0.8  /  DBili  x   /  AST  19  /  ALT  17  /  AlkPhos  76  07-11

## 2024-07-13 NOTE — DIETITIAN INITIAL EVALUATION ADULT - OTHER INFO
79 y/o Female with significant PMH of Hypothyroidism, dyslipidemia, Loop recorder in place (patient has no much information about its placement), Anxiety and depression, and others presented in ED with c/o shortness of breath for last few days.  She endorses that she has not been taking her any medications for last couple weeks.    Known to RD service, met criteria for PCM on previous admissions. Denies changes to appetite since admission; still meeting ~50-75% ENN. C/o SOB/ pain this morning upon RD visit. S/p ILR interrogation this morning. UBW stated at 100# x 6 months ago however endorses wt loss since, stating current wt at ~90#. Wt hx reviewed: 99# on 3/10/24 (taken by RD); 99# on 5/28/21 (taken by RD). Bed scale wt of 102# taken by RD on 7/13/24; no pertinent wt changes observed at this time. NFPE reveals moderate to severe muscle/fat wasting - appeared thin, frail. Receptive to trial Ensure + HP shake BID to optimize nutritional needs (provides 350 kcal, 20g protein/ shake). Liberalize diet to regular to maximize caloric and nutrient intake. See other recs below.

## 2024-07-13 NOTE — DIETITIAN INITIAL EVALUATION ADULT - ORAL INTAKE PTA/DIET HISTORY
Lives at home by herself. Denies difficulties w/ cooking and grocery shopping. States she does not do a lot of cooking d/t living alone. Endorses poor to fair appetite PTA. Typically consumes 2 meals per day (late breakfast, dinner); states appetite is not as good "as it used to be." Denies ONS use at home. Also denies difficulties w/ cooking, grocery shopping, and NKFA.

## 2024-07-14 DIAGNOSIS — R41.89 OTHER SYMPTOMS AND SIGNS INVOLVING COGNITIVE FUNCTIONS AND AWARENESS: ICD-10-CM

## 2024-07-14 PROCEDURE — 99222 1ST HOSP IP/OBS MODERATE 55: CPT

## 2024-07-14 PROCEDURE — 99232 SBSQ HOSP IP/OBS MODERATE 35: CPT

## 2024-07-14 RX ADMIN — ALPRAZOLAM 0.5 MILLIGRAM(S): 2 TABLET ORAL at 21:05

## 2024-07-14 RX ADMIN — PREDNISONE 40 MILLIGRAM(S): 10 TABLET ORAL at 09:12

## 2024-07-14 RX ADMIN — ATORVASTATIN CALCIUM 20 MILLIGRAM(S): 20 TABLET, FILM COATED ORAL at 21:05

## 2024-07-14 RX ADMIN — SERTRALINE HYDROCHLORIDE 25 MILLIGRAM(S): 100 TABLET, FILM COATED ORAL at 09:12

## 2024-07-14 RX ADMIN — PANTOPRAZOLE SODIUM 40 MILLIGRAM(S): 40 INJECTION, POWDER, FOR SOLUTION INTRAVENOUS at 06:00

## 2024-07-14 RX ADMIN — ENOXAPARIN SODIUM 40 MILLIGRAM(S): 100 INJECTION SUBCUTANEOUS at 22:09

## 2024-07-14 RX ADMIN — Medication 3 MILLIGRAM(S): at 21:05

## 2024-07-14 RX ADMIN — Medication 50 MICROGRAM(S): at 09:12

## 2024-07-14 RX ADMIN — ALPRAZOLAM 0.5 MILLIGRAM(S): 2 TABLET ORAL at 09:12

## 2024-07-14 RX ADMIN — Medication 2 PUFF(S): at 18:05

## 2024-07-14 NOTE — BH CONSULTATION LIAISON ASSESSMENT NOTE - HPI (INCLUDE ILLNESS QUALITY, SEVERITY, DURATION, TIMING, CONTEXT, MODIFYING FACTORS, ASSOCIATED SIGNS AND SYMPTOMS)
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  Sometimes, she also has right sided chest discomfort. She endorses that she has not been taking her any medications for last couple weeks. She does not know more about her loop recorder when and where was it placed. She was seen in  ED just 2 days ago for the same on 07/09, and her D-dimer was mildly elevated but CT angio chest was negative for any acute PE or consolidation but it showed subsegmental atelectasis, scarring and calcified granulomas.  Psychiatry was consulted to assess for management of anxiety.  Patient seen and evaluated awake and alert oriented to person, place, disoriented to the time states it's April 2024 and states she is currently 79 years old (but she is 78 years old).  Patient unable to state why she is in the hospital and for how long, states she "feels like I'm getting worse here", but unable to describe what is most troubling to her.  She reports she does feel anxious, states she believes she was started on medications while she was here in the hospital for anxiety but can not recall.  She reports she lives along in [place], is  for the last 3 years, has a daughter who she is close to in [place].  She also relays she has a son in [place] but is estranged from her son and hasn't spoke to him is years; states he refuses to have a relation ship with her or her daughter which upsets her.  She also states her son has children (her grandchildren) whom she hasn't had any contact with as well.  She reports ability to function at home, still cooking, keeping up with her house; states she no longer drives and takes cabs to run errands.  She denies worsening mood, depression but does endorse anxiety.  She denies use of any illicit substance or alcohol.  She denies SI/HI, AVH or thoughts of paranoia.  She reports eating and sleeping well.  She asked about getting medications for her memory; she reports she has seen neurology in the past but can't recall what they have told her about her condition.  Advised that she can follow up with neurology as an outpt and if with dementia that there are medications that can be discussed with her neurologist.

## 2024-07-14 NOTE — BH CONSULTATION LIAISON ASSESSMENT NOTE - CURRENT MEDICATION
MEDICATIONS  (STANDING):  ALPRAZolam 0.5 milliGRAM(s) Oral two times a day  atorvastatin 20 milliGRAM(s) Oral at bedtime  enoxaparin Injectable 40 milliGRAM(s) SubCutaneous every 24 hours  levothyroxine 50 MICROGram(s) Oral daily  pantoprazole    Tablet 40 milliGRAM(s) Oral before breakfast  predniSONE   Tablet 40 milliGRAM(s) Oral daily  sertraline 25 milliGRAM(s) Oral daily    MEDICATIONS  (PRN):  acetaminophen     Tablet .. 650 milliGRAM(s) Oral every 6 hours PRN Temp greater or equal to 38C (100.4F), Mild Pain (1 - 3)  albuterol    90 MICROgram(s) HFA Inhaler 2 Puff(s) Inhalation every 6 hours PRN Shortness of Breath and/or Wheezing  aluminum hydroxide/magnesium hydroxide/simethicone Suspension 30 milliLiter(s) Oral every 4 hours PRN Dyspepsia  guaifenesin/dextromethorphan Oral Liquid 10 milliLiter(s) Oral four times a day PRN Cough  melatonin 3 milliGRAM(s) Oral at bedtime PRN Insomnia  ondansetron Injectable 4 milliGRAM(s) IV Push every 8 hours PRN Nausea and/or Vomiting  
Home

## 2024-07-14 NOTE — BH CONSULTATION LIAISON ASSESSMENT NOTE - SUMMARY
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  Sometimes, she also has right sided chest discomfort. She endorses that she has not been taking her any medications for last couple weeks. She does not know more about her loop recorder when and where was it placed. She was seen in  ED just 2 days ago for the same on 07/09, and her D-dimer was mildly elevated but CT angio chest was negative for any acute PE or consolidation but it showed subsegmental atelectasis, scarring and calcified granulomas.  Psychiatry was consulted to assess for management of anxiety.  On evaluation, patient seen and evaluated awake and alert oriented to person and place; she is slightly confused, poor historian at times.  She denies SI/HI, AVH or thoughts of paranoia.  She denies worsening mood or depressive sx but reports anxiety.  She denies use of illicit substance or alcohol, she reports eating and sleeping well.  At this time, would consider continuing Zoloft 25mg daily as this was just recently started.  If no cardiac contraindications, can use Seroquel 12.5mg prn q6 for anxiety, her qtc was less then 500ms.  Would try to avoid using benzos which can worsen confusion, risk for falls in the elderly.

## 2024-07-14 NOTE — BH CONSULTATION LIAISON ASSESSMENT NOTE - DESCRIPTION
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

## 2024-07-14 NOTE — BH CONSULTATION LIAISON ASSESSMENT NOTE - NSBHCONSULTRECOMMENDOTHER_PSY_A_CORE FT
-c/w Zoloft 25mg daily  -if no cardiac contraindications, can use Seroquel 12.5mg po prn q6 for anxiety; would avoid use of benzos which increases risk for confusion, falls, respiratory depression in the elderly  -Check B12, folate, RPR, UA, U-tox  - Minimize use of benzos, opioids, anticholinergics, or other deliriogenic agents when possible.  Maintain sleep wake cycle.  Provide frequent reorientation and redirection.  Family member at bedside if possible. Assess for need for glasses and hearing aid (if applicable).  -Follow up outpt neurology to r/o dementia

## 2024-07-14 NOTE — BH CONSULTATION LIAISON ASSESSMENT NOTE - NSBHATTESTAPPBILLTIME_PSY_A_CORE
I attest my time as NICA is greater than 50% of the total combined time spent on qualifying patient care activities. I have reviewed and verified the documentation.

## 2024-07-14 NOTE — BH CONSULTATION LIAISON ASSESSMENT NOTE - NSICDXPASTSURGICALHX_GEN_ALL_CORE_FT
PAST SURGICAL HISTORY:  H/O cardiac radiofrequency ablation     Implantable loop recorder present

## 2024-07-14 NOTE — BH CONSULTATION LIAISON ASSESSMENT NOTE - NSBHCHARTREVIEWINVESTIGATE_PSY_A_CORE FT
< from: 12 Lead ECG (07.11.24 @ 13:54) >      Ventricular Rate 71 BPM    Atrial Rate 71 BPM    P-R Interval 148 ms    QRS Duration 78 ms    Q-T Interval 422 ms    QTC Calculation(Bazett) 458 ms    P Axis 58 degrees    R Axis 36 degrees    T Axis 45 degrees    Diagnosis Line Normal sinus rhythm  Normal ECG  When compared with ECG of 09-JUL-2024 20:46,  No significant change was found  Confirmed by Palla MD, Hi ([id number]) on 7/11/2024 11:54:40 PM    < end of copied text >

## 2024-07-14 NOTE — BH CONSULTATION LIAISON ASSESSMENT NOTE - NSICDXPASTMEDICALHX_GEN_ALL_CORE_FT
PAST MEDICAL HISTORY:  Anxiety and depression     HTN (hypertension)     Hypothyroid     Implantable loop recorder present

## 2024-07-14 NOTE — BH CONSULTATION LIAISON ASSESSMENT NOTE - RISK ASSESSMENT
Risk factors: acute/chronic medical issues, acute/chronic cognitive impairments, underlying mood sx and anxiety    Protective factors: no current SIIP/HIIP, no h/o SA/SIB, no h/o psych admissions, no access to weapons, no active substance abuse, with adult children, domiciled, social supports, help-seeking behaviors    Overall, pt is a low risk of harm to self/others and does not require psychiatric admission for safety and stabilization.

## 2024-07-14 NOTE — BH CONSULTATION LIAISON ASSESSMENT NOTE - NSBHCHARTREVIEWVS_PSY_A_CORE FT
Vital Signs Last 24 Hrs  T(C): 36.7 (14 Jul 2024 08:36), Max: 36.8 (13 Jul 2024 15:44)  T(F): 98.1 (14 Jul 2024 08:36), Max: 98.2 (13 Jul 2024 15:44)  HR: 64 (14 Jul 2024 08:36) (64 - 80)  BP: 112/87 (14 Jul 2024 08:36) (95/65 - 112/87)  BP(mean): --  RR: 18 (14 Jul 2024 08:36) (18 - 18)  SpO2: 97% (14 Jul 2024 08:36) (95% - 97%)    Parameters below as of 14 Jul 2024 08:36  Patient On (Oxygen Delivery Method): room air

## 2024-07-15 PROCEDURE — 99232 SBSQ HOSP IP/OBS MODERATE 35: CPT

## 2024-07-15 RX ADMIN — ATORVASTATIN CALCIUM 20 MILLIGRAM(S): 20 TABLET, FILM COATED ORAL at 21:09

## 2024-07-15 RX ADMIN — PANTOPRAZOLE SODIUM 40 MILLIGRAM(S): 40 INJECTION, POWDER, FOR SOLUTION INTRAVENOUS at 06:44

## 2024-07-15 RX ADMIN — ALPRAZOLAM 0.5 MILLIGRAM(S): 2 TABLET ORAL at 08:31

## 2024-07-15 RX ADMIN — PREDNISONE 40 MILLIGRAM(S): 10 TABLET ORAL at 11:08

## 2024-07-15 RX ADMIN — Medication 3 MILLIGRAM(S): at 21:10

## 2024-07-15 RX ADMIN — ENOXAPARIN SODIUM 40 MILLIGRAM(S): 100 INJECTION SUBCUTANEOUS at 23:12

## 2024-07-15 RX ADMIN — SERTRALINE HYDROCHLORIDE 25 MILLIGRAM(S): 100 TABLET, FILM COATED ORAL at 11:08

## 2024-07-15 RX ADMIN — Medication 12.5 MILLIGRAM(S): at 14:32

## 2024-07-15 NOTE — PROGRESS NOTE ADULT - NUTRITIONAL ASSESSMENT
This patient has been assessed with a concern for Malnutrition and has been determined to have a diagnosis/diagnoses of Severe protein-calorie malnutrition.    This patient is being managed with:   Diet DASH/TLC-  Sodium & Cholesterol Restricted  Entered: Jul 11 2024  4:40PM  

## 2024-07-16 ENCOUNTER — TRANSCRIPTION ENCOUNTER (OUTPATIENT)
Age: 79
End: 2024-07-16

## 2024-07-16 PROCEDURE — 99239 HOSP IP/OBS DSCHRG MGMT >30: CPT

## 2024-07-16 RX ORDER — ALBUTEROL 90 MCG
2 AEROSOL REFILL (GRAM) INHALATION
Qty: 17 | Refills: 0
Start: 2024-07-16 | End: 2024-08-14

## 2024-07-16 RX ORDER — GUAIFENESIN/DEXTROMETHORPHAN 100-10MG/5
10 LIQUID (ML) ORAL
Qty: 200 | Refills: 0
Start: 2024-07-16 | End: 2024-07-20

## 2024-07-16 RX ADMIN — PANTOPRAZOLE SODIUM 40 MILLIGRAM(S): 40 INJECTION, POWDER, FOR SOLUTION INTRAVENOUS at 06:02

## 2024-07-16 RX ADMIN — Medication 12.5 MILLIGRAM(S): at 21:09

## 2024-07-16 RX ADMIN — SERTRALINE HYDROCHLORIDE 25 MILLIGRAM(S): 100 TABLET, FILM COATED ORAL at 10:46

## 2024-07-16 RX ADMIN — ENOXAPARIN SODIUM 40 MILLIGRAM(S): 100 INJECTION SUBCUTANEOUS at 22:46

## 2024-07-16 RX ADMIN — ATORVASTATIN CALCIUM 20 MILLIGRAM(S): 20 TABLET, FILM COATED ORAL at 21:05

## 2024-07-16 RX ADMIN — Medication 12.5 MILLIGRAM(S): at 14:38

## 2024-07-16 RX ADMIN — Medication 3 MILLIGRAM(S): at 21:09

## 2024-07-16 RX ADMIN — Medication 50 MICROGRAM(S): at 05:09

## 2024-07-16 NOTE — DISCHARGE NOTE PROVIDER - NSDCCPCAREPLAN_GEN_ALL_CORE_FT
PRINCIPAL DISCHARGE DIAGNOSIS  Diagnosis: Anxiety  Assessment and Plan of Treatment: seroquel started per Psych   f/u with your PCP in 1-3 days        SECONDARY DISCHARGE DIAGNOSES  Diagnosis: Dyspnea on exertion  Assessment and Plan of Treatment: albuterol as needed  f/u with Dr. Segura in 1 week

## 2024-07-16 NOTE — DISCHARGE NOTE PROVIDER - DETAILS OF MALNUTRITION DIAGNOSIS/DIAGNOSES
This patient has been assessed with a concern for Malnutrition and was treated during this hospitalization for the following Nutrition diagnosis/diagnoses:     -  07/13/2024: Severe protein-calorie malnutrition

## 2024-07-16 NOTE — DISCHARGE NOTE PROVIDER - PROVIDER TOKENS
FREE:[LAST:[PCP],PHONE:[(   )    -],FAX:[(   )    -],FOLLOWUP:[1-3 days]],PROVIDER:[TOKEN:[[id number]],FOLLOWUP:[1 week]]

## 2024-07-16 NOTE — DISCHARGE NOTE PROVIDER - HOSPITAL COURSE
PHYSICAL EXAM:    Daily     Daily     Vital Signs Last 24 Hrs  T(C): 36.7 (16 Jul 2024 07:57), Max: 36.8 (15 Jul 2024 23:16)  T(F): 98.1 (16 Jul 2024 07:57), Max: 98.2 (15 Jul 2024 23:16)  HR: 64 (16 Jul 2024 07:57) (64 - 83)  BP: 146/58 (16 Jul 2024 07:57) (94/66 - 146/58)  BP(mean): 89 (15 Jul 2024 17:01) (76 - 89)  RR: 18 (16 Jul 2024 07:57) (17 - 18)  SpO2: 96% (16 Jul 2024 07:57) (94% - 98%)    Constitutional: Well  appearing  HEENT: Atraumatic, JEREMIAS, Normal, No congestion  Respiratory: Breath Sounds normal, no rhonchi/wheeze  Cardiovascular: N S1S2;   Gastrointestinal: Abdomen soft, non tender, Bowel Sounds present  Extremities: No edema, peripheral pulses present  Neurological: AAO x 3, no gross focal motor deficits  Skin: Non cellulitic, no rash, ulcers  Lymph Nodes: No lymphadenopathy noted  Back: No CVA tenderness   Musculoskeletal: non tender  Breasts: Deferred  Genitourinary: deferred  Rectal: Deferred      The patient is a 78y year old Female with significant PMH of Hypothyroidism, dyslipidemia, Loop recorder in place (patient has not much information about its placement), Anxiety and depression,  presented in ED with c/o shortness of breath for last few days.  Sometimes, she also has right sided chest discomfort. Otherwise, she denies fever, chills, palpitation, cough, leg swelling or leg claudication, N/V, abdominal pain, diarrhea or dysuria. She has not seen her PCP for over a year.  She endorses that she has not been taking her any medications for last couple weeks. She denies any long distance travel.  She also denies h/o COPD or asthma.  Denies smoking, alcohol or substance abuse. She does not know more about her loop recorder when and where was it placed. She was seen in [facility] just 2 days ago for the same on 07/09, and her D-dimer was mildly elevated but CT angio chest was negative for any acute PE or consolidation but it showed subsegmental atelectasis, scarring and calcified granulomas.      Pt admitted with :    # Dyspnea on exertion: likely Bronchitis with anxiety component  She is not hypoxic and saturating 100% on RA.  -CT angio negative for PE and consolidation, but showed subsegmental atelectasis, scarring and calcified granulomas.  -Troponin and BNP negative.   -Admit to medical floor.  -Albuterol inhaler prn.  -May need PFT as outpatient.  -Pulmonary consult appreciated   robitussin   xanax d/gretchen  check phos; normal  c/o increasing anxiety; Psych consult requested  d/c prednisone     # Dyslipidemia with h/o Loop recorder in place   -Continue her Atorvastatin.  EP consult for ILR interrogation    # Hypothyroidism.  -On Levothyroxine.  -normal TSH level.    # Anxiety and depression.  -On Sertraline and Alprazolam.  alprazolam increased to 0.5 mg bid and now stopped  Seroquel added per Psych consult    d/c home    time spent 40 min   PHYSICAL EXAM:    Daily     Daily     Vital Signs Last 24 Hrs  Vital Signs Last 24 Hrs  T(C): 36.8 (17 Jul 2024 07:32), Max: 36.8 (16 Jul 2024 21:14)  T(F): 98.2 (17 Jul 2024 07:32), Max: 98.2 (16 Jul 2024 21:14)  HR: 62 (17 Jul 2024 07:32) (62 - 79)  BP: 125/85 (17 Jul 2024 07:32) (99/75 - 125/85)  BP(mean): --  RR: 18 (17 Jul 2024 07:32) (18 - 18)  SpO2: 97% (17 Jul 2024 07:32) (97% - 100%)    Parameters below as of 17 Jul 2024 07:32  Patient On (Oxygen Delivery Method): room air        Constitutional: Well  appearing  HEENT: Atraumatic, JEREMIAS, Normal, No congestion  Respiratory: Breath Sounds normal, no rhonchi/wheeze  Cardiovascular: N S1S2;   Gastrointestinal: Abdomen soft, non tender, Bowel Sounds present  Extremities: No edema, peripheral pulses present  Neurological: AAO x 3, no gross focal motor deficits  Skin: Non cellulitic, no rash, ulcers  Lymph Nodes: No lymphadenopathy noted  Back: No CVA tenderness   Musculoskeletal: non tender  Breasts: Deferred  Genitourinary: deferred  Rectal: Deferred      The patient is a 78y year old Female with significant PMH of Hypothyroidism, dyslipidemia, Loop recorder in place (patient has not much information about its placement), Anxiety and depression,  presented in ED with c/o shortness of breath for last few days.  Sometimes, she also has right sided chest discomfort. Otherwise, she denies fever, chills, palpitation, cough, leg swelling or leg claudication, N/V, abdominal pain, diarrhea or dysuria. She has not seen her PCP for over a year.  She endorses that she has not been taking her any medications for last couple weeks. She denies any long distance travel.  She also denies h/o COPD or asthma.  Denies smoking, alcohol or substance abuse. She does not know more about her loop recorder when and where was it placed. She was seen in [facility] just 2 days ago for the same on 07/09, and her D-dimer was mildly elevated but CT angio chest was negative for any acute PE or consolidation but it showed subsegmental atelectasis, scarring and calcified granulomas.      Pt admitted with :    # Dyspnea on exertion: likely Bronchitis with anxiety component  She is not hypoxic and saturating 100% on RA.  -CT angio negative for PE and consolidation, but showed subsegmental atelectasis, scarring and calcified granulomas.  -Troponin and BNP negative.   -Admit to medical floor.  -Albuterol inhaler prn.  -May need PFT as outpatient.  -Pulmonary consult appreciated   robitussin   xanax d/gretchen  check phos; normal  c/o increasing anxiety; Psych consult requested  d/c prednisone     # Dyslipidemia with h/o Loop recorder in place   -Continue her Atorvastatin.  EP consult for ILR interrogation    # Hypothyroidism.  -On Levothyroxine.  -normal TSH level.    # Anxiety and depression.  -On Sertraline and Alprazolam.  alprazolam increased to 0.5 mg bid and now stopped  Seroquel added per Psych consult    d/c home    time spent 40 min

## 2024-07-16 NOTE — DISCHARGE NOTE PROVIDER - NSDCMRMEDTOKEN_GEN_ALL_CORE_FT
albuterol 90 mcg/inh inhalation aerosol: 2 puff(s) inhaled every 6 hours as needed for Shortness of Breath and/or Wheezing  aspirin 81 mg oral delayed release tablet: 1 tab(s) orally once a day  atorvastatin 20 mg oral tablet: 1 tab(s) orally once a day  Cerefolin oral tablet: 1 tab(s) orally once a day  donepezil 10 mg oral tablet: 1 tab(s) orally once a day  guaiFENesin-dextromethorphan 100 mg-10 mg/5 mL oral liquid: 10 milliliter(s) orally 4 times a day as needed for Cough  levothyroxine 50 mcg (0.05 mg) oral tablet: 1 tab(s) orally once a day  memantine 10 mg oral tablet: 1 tab(s) orally 2 times a day  Seroquel 25 mg oral tablet: 0.5 tab(s) orally every 6 hours  sertraline 25 mg oral tablet: 1 tab(s) orally once a day

## 2024-07-16 NOTE — DISCHARGE NOTE PROVIDER - CARE PROVIDER_API CALL
PCP,   Phone: (   )    -  Fax: (   )    -  Follow Up Time: 1-3 days    Donato Segura  Pulmonary Disease  [address] [place] [address]  Phone: [phone]  Fax: [phone]  Follow Up Time: 1 week

## 2024-07-16 NOTE — DISCHARGE NOTE PROVIDER - NSDCFUSCHEDAPPT_GEN_ALL_CORE_FT
Stef Juan  [facility]  INTMED [address]  Scheduled Appointment: 07/19/2024    [facility]  ELECTROPH [address]  Scheduled Appointment: 07/25/2024

## 2024-07-17 ENCOUNTER — TRANSCRIPTION ENCOUNTER (OUTPATIENT)
Age: 79
End: 2024-07-17

## 2024-07-17 VITALS
HEART RATE: 62 BPM | TEMPERATURE: 98 F | DIASTOLIC BLOOD PRESSURE: 85 MMHG | OXYGEN SATURATION: 97 % | RESPIRATION RATE: 18 BRPM | SYSTOLIC BLOOD PRESSURE: 125 MMHG

## 2024-07-17 PROCEDURE — 99239 HOSP IP/OBS DSCHRG MGMT >30: CPT

## 2024-07-17 RX ADMIN — Medication 50 MICROGRAM(S): at 05:25

## 2024-07-17 RX ADMIN — PANTOPRAZOLE SODIUM 40 MILLIGRAM(S): 40 INJECTION, POWDER, FOR SOLUTION INTRAVENOUS at 06:02

## 2024-07-17 RX ADMIN — SERTRALINE HYDROCHLORIDE 25 MILLIGRAM(S): 100 TABLET, FILM COATED ORAL at 10:40

## 2024-07-17 NOTE — DISCHARGE NOTE NURSING/CASE MANAGEMENT/SOCIAL WORK - PATIENT PORTAL LINK FT
You can access the FollowMyHealth Patient Portal offered by [facility] by registering at the following website: [email or web address]. By joining [facility]’s FollowMyHealth portal, you will also be able to view your health information using other applications (apps) compatible with our system.

## 2024-07-17 NOTE — PROGRESS NOTE ADULT - SUBJECTIVE AND OBJECTIVE BOX
7/12: pt c/o sob. cough, difficulty breathing  pulse ox 100% on room air  pt appears anxious  asking for ILR to be checked  does not know her doctors    7/13: pt c/o anxiety  xanax increased to 0.5 mg bid    PHYSICAL EXAM:    Daily Height in cm: 152.4 (11 Jul 2024 13:50)    Daily     Vital Signs Last 24 Hrs  T(C): 36.7 (13 Jul 2024 08:03), Max: 36.9 (12 Jul 2024 15:42)  T(F): 98.1 (13 Jul 2024 08:03), Max: 98.4 (12 Jul 2024 15:42)  HR: 57 (13 Jul 2024 08:03) (57 - 69)  BP: 128/64 (13 Jul 2024 08:03) (101/65 - 128/64)  BP(mean): --  RR: 18 (13 Jul 2024 08:03) (18 - 18)  SpO2: 98% (13 Jul 2024 08:03) (93% - 98%)    Parameters below as of 13 Jul 2024 08:03  Patient On (Oxygen Delivery Method): room air        Constitutional: Weak  appearing  HEENT: Atraumatic, JEREMIAS,   Respiratory: Breath Sounds normal, no rhonchi/wheeze  Cardiovascular: N S1S2;   Gastrointestinal: Abdomen soft, non tender, Bowel Sounds present  Extremities: No edema, peripheral pulses present  Neurological: AAO x 3, no gross focal motor deficits  Skin: Non cellulitic, no rash, ulcers  Lymph Nodes: No lymphadenopathy noted  Back: No CVA tenderness   Musculoskeletal: non tender  Breasts: Deferred  Genitourinary: deferred  Rectal: Deferred    All Labs/EKG/Radiology/Meds reviewed by me                          12.0   6.48  )-----------( 287      ( 12 Jul 2024 07:06 )             36.2       CBC Full  -  ( 12 Jul 2024 07:06 )  WBC Count : 6.48 K/uL  RBC Count : 3.82 M/uL  Hemoglobin : 12.0 g/dL  Hematocrit : 36.2 %  Platelet Count - Automated : 287 K/uL  Mean Cell Volume : 94.8 fl  Mean Cell Hemoglobin : 31.4 pg  Mean Cell Hemoglobin Concentration : 33.1 gm/dL  Auto Neutrophil # : x  Auto Lymphocyte # : x  Auto Monocyte # : x  Auto Eosinophil # : x  Auto Basophil # : x  Auto Neutrophil % : x  Auto Lymphocyte % : x  Auto Monocyte % : x  Auto Eosinophil % : x  Auto Basophil % : x      07-12    143  |  111<H>  |  20  ----------------------------<  118<H>  4.3   |  27  |  1.14    Ca    8.6      12 Jul 2024 07:06  Mg     2.1     07-11    TPro  7.3  /  Alb  3.7  /  TBili  0.8  /  DBili  x   /  AST  19  /  ALT  17  /  AlkPhos  76  07-11      LIVER FUNCTIONS - ( 11 Jul 2024 14:54 )  Alb: 3.7 g/dL / Pro: 7.3 gm/dL / ALK PHOS: 76 U/L / ALT: 17 U/L / AST: 19 U/L / GGT: x                       Urinalysis Basic - ( 12 Jul 2024 07:06 )    Color: x / Appearance: x / SG: x / pH: x  Gluc: 118 mg/dL / Ketone: x  / Bili: x / Urobili: x   Blood: x / Protein: x / Nitrite: x   Leuk Esterase: x / RBC: x / WBC x   Sq Epi: x / Non Sq Epi: x / Bacteria: x      < from: CT Angio Chest PE Protocol w/ IV Cont (07.09.24 @ 23:31) >  IMPRESSION: No pulmonary embolism.    Noairspace consolidation.    < end of copied text >  < from: Xray Chest 1 View- PORTABLE-Urgent (07.09.24 @ 21:26) >  IMPRESSION:   No radiographic evidence of active chest disease..    < end of copied text >        MEDICATIONS  (STANDING):  ALPRAZolam 0.5 milliGRAM(s) Oral two times a day  atorvastatin 20 milliGRAM(s) Oral at bedtime  enoxaparin Injectable 40 milliGRAM(s) SubCutaneous every 24 hours  levothyroxine 50 MICROGram(s) Oral daily  pantoprazole    Tablet 40 milliGRAM(s) Oral before breakfast  predniSONE   Tablet 40 milliGRAM(s) Oral daily  sertraline 25 milliGRAM(s) Oral daily    MEDICATIONS  (PRN):  acetaminophen     Tablet .. 650 milliGRAM(s) Oral every 6 hours PRN Temp greater or equal to 38C (100.4F), Mild Pain (1 - 3)  albuterol    90 MICROgram(s) HFA Inhaler 2 Puff(s) Inhalation every 6 hours PRN Shortness of Breath and/or Wheezing  aluminum hydroxide/magnesium hydroxide/simethicone Suspension 30 milliLiter(s) Oral every 4 hours PRN Dyspepsia  guaifenesin/dextromethorphan Oral Liquid 10 milliLiter(s) Oral four times a day PRN Cough  melatonin 3 milliGRAM(s) Oral at bedtime PRN Insomnia  ondansetron Injectable 4 milliGRAM(s) IV Push every 8 hours PRN Nausea and/or Vomiting      
Subjective:    pat better, denies any complaint, sitting in chair, no respiratory distress.    Home Medications:  atorvastatin 20 mg oral tablet: 1 tab(s) orally once a day (12 Jul 2024 12:03)  Cerefolin oral tablet: 1 tab(s) orally once a day (12 Jul 2024 12:03)  donepezil 10 mg oral tablet: 1 tab(s) orally once a day (12 Jul 2024 11:58)  levothyroxine 50 mcg (0.05 mg) oral tablet: 1 tab(s) orally once a day (12 Jul 2024 11:58)  memantine 10 mg oral tablet: 1 tab(s) orally 2 times a day (12 Jul 2024 12:04)  sertraline 25 mg oral tablet: 1 tab(s) orally once a day (12 Jul 2024 11:58)    MEDICATIONS  (STANDING):  atorvastatin 20 milliGRAM(s) Oral at bedtime  enoxaparin Injectable 40 milliGRAM(s) SubCutaneous every 24 hours  levothyroxine 50 MICROGram(s) Oral daily  pantoprazole    Tablet 40 milliGRAM(s) Oral before breakfast  sertraline 25 milliGRAM(s) Oral daily    MEDICATIONS  (PRN):  acetaminophen     Tablet .. 650 milliGRAM(s) Oral every 6 hours PRN Temp greater or equal to 38C (100.4F), Mild Pain (1 - 3)  albuterol    90 MICROgram(s) HFA Inhaler 2 Puff(s) Inhalation every 6 hours PRN Shortness of Breath and/or Wheezing  aluminum hydroxide/magnesium hydroxide/simethicone Suspension 30 milliLiter(s) Oral every 4 hours PRN Dyspepsia  guaifenesin/dextromethorphan Oral Liquid 10 milliLiter(s) Oral four times a day PRN Cough  melatonin 3 milliGRAM(s) Oral at bedtime PRN Insomnia  ondansetron Injectable 4 milliGRAM(s) IV Push every 8 hours PRN Nausea and/or Vomiting  QUEtiapine 12.5 milliGRAM(s) Oral every 6 hours PRN anxiety      Allergies    codeine (Unknown)  penicillins (Unknown)  morphine (Unknown)    Intolerances        Vital Signs Last 24 Hrs  T(C): 36.7 (16 Jul 2024 16:13), Max: 36.8 (15 Jul 2024 23:16)  T(F): 98.1 (16 Jul 2024 16:13), Max: 98.2 (15 Jul 2024 23:16)  HR: 79 (16 Jul 2024 17:31) (64 - 79)  BP: 120/72 (16 Jul 2024 17:31) (99/75 - 146/58)  BP(mean): --  RR: 18 (16 Jul 2024 16:13) (18 - 18)  SpO2: 100% (16 Jul 2024 16:13) (96% - 100%)    Parameters below as of 16 Jul 2024 16:13  Patient On (Oxygen Delivery Method): room air          PHYSICAL EXAMINATION:    NECK:  Supple. No lymphadenopathy. Jugular venous pressure not elevated. Carotids equal.   HEART:   The cardiac impulse has a normal quality. Reg., Nl S1 and S2.  There are no murmurs, rubs or gallops noted  CHEST:  Chest is clear to auscultation. Normal respiratory effort.  ABDOMEN:  Soft and nontender.   EXTREMITIES:  There is no edema.       LABS:                    
Subjective:    pat better, sitting in bed, anxious, no respiratory complaint.    Home Medications:  ALPRAZolam 0.25 mg oral tablet: 1 tab(s) orally 2 times a day (12 Jul 2024 11:57)  atorvastatin 20 mg oral tablet: 1 tab(s) orally once a day (12 Jul 2024 12:03)  Cerefolin oral tablet: 1 tab(s) orally once a day (12 Jul 2024 12:03)  donepezil 10 mg oral tablet: 1 tab(s) orally once a day (12 Jul 2024 11:58)  doxycycline hyclate 100 mg oral capsule: 1 cap(s) orally 2 times a day for 7 days ***NOT COMPLETE*** (12 Jul 2024 11:56)  levothyroxine 50 mcg (0.05 mg) oral tablet: 1 tab(s) orally once a day (12 Jul 2024 11:58)  Medrol Dosepak 4 mg oral tablet: Take as directed, Medrol Dosepak Instructions:  Take 6 tablets by mouth on day 1, then 5 tablets on day 2, then 4 tablets on day 3, then 3 tablets on day 4, then 2 tablets on day 5, then 1 tablet on day 6 ***NOT COMPLETE*** (12 Jul 2024 11:56)  memantine 10 mg oral tablet: 1 tab(s) orally 2 times a day (12 Jul 2024 12:04)  sertraline 25 mg oral tablet: 1 tab(s) orally once a day (12 Jul 2024 11:58)    MEDICATIONS  (STANDING):  atorvastatin 20 milliGRAM(s) Oral at bedtime  enoxaparin Injectable 40 milliGRAM(s) SubCutaneous every 24 hours  levothyroxine 50 MICROGram(s) Oral daily  pantoprazole    Tablet 40 milliGRAM(s) Oral before breakfast  sertraline 25 milliGRAM(s) Oral daily    MEDICATIONS  (PRN):  acetaminophen     Tablet .. 650 milliGRAM(s) Oral every 6 hours PRN Temp greater or equal to 38C (100.4F), Mild Pain (1 - 3)  albuterol    90 MICROgram(s) HFA Inhaler 2 Puff(s) Inhalation every 6 hours PRN Shortness of Breath and/or Wheezing  aluminum hydroxide/magnesium hydroxide/simethicone Suspension 30 milliLiter(s) Oral every 4 hours PRN Dyspepsia  guaifenesin/dextromethorphan Oral Liquid 10 milliLiter(s) Oral four times a day PRN Cough  melatonin 3 milliGRAM(s) Oral at bedtime PRN Insomnia  ondansetron Injectable 4 milliGRAM(s) IV Push every 8 hours PRN Nausea and/or Vomiting  QUEtiapine 12.5 milliGRAM(s) Oral every 6 hours PRN anxiety      Allergies    codeine (Unknown)  penicillins (Unknown)  morphine (Unknown)    Intolerances        Vital Signs Last 24 Hrs  T(C): 36.7 (15 Jul 2024 15:44), Max: 36.9 (15 Jul 2024 08:18)  T(F): 98.1 (15 Jul 2024 15:44), Max: 98.4 (15 Jul 2024 08:18)  HR: 79 (15 Jul 2024 15:44) (60 - 79)  BP: 94/66 (15 Jul 2024 15:44) (94/66 - 127/77)  BP(mean): 76 (15 Jul 2024 15:44) (76 - 76)  RR: 17 (15 Jul 2024 15:44) (17 - 18)  SpO2: 94% (15 Jul 2024 15:44) (94% - 100%)    Parameters below as of 15 Jul 2024 15:44  Patient On (Oxygen Delivery Method): room air          PHYSICAL EXAMINATION:    NECK:  Supple. No lymphadenopathy. Jugular venous pressure not elevated. Carotids equal.   HEART:   The cardiac impulse has a normal quality. Reg., Nl S1 and S2.  There are no murmurs, rubs or gallops noted  CHEST:  Chest crackles to auscultation. Normal respiratory effort.  ABDOMEN:  Soft and nontender.   EXTREMITIES:  There is no edema.       LABS:                    
Subjective:    pat better, sitting in chair, no new complaint.    Home Medications:  atorvastatin 20 mg oral tablet: 1 tab(s) orally once a day (12 Jul 2024 12:03)  Cerefolin oral tablet: 1 tab(s) orally once a day (12 Jul 2024 12:03)  donepezil 10 mg oral tablet: 1 tab(s) orally once a day (12 Jul 2024 11:58)  levothyroxine 50 mcg (0.05 mg) oral tablet: 1 tab(s) orally once a day (12 Jul 2024 11:58)  memantine 10 mg oral tablet: 1 tab(s) orally 2 times a day (12 Jul 2024 12:04)  sertraline 25 mg oral tablet: 1 tab(s) orally once a day (12 Jul 2024 11:58)    MEDICATIONS  (STANDING):  atorvastatin 20 milliGRAM(s) Oral at bedtime  enoxaparin Injectable 40 milliGRAM(s) SubCutaneous every 24 hours  levothyroxine 50 MICROGram(s) Oral daily  pantoprazole    Tablet 40 milliGRAM(s) Oral before breakfast  sertraline 25 milliGRAM(s) Oral daily    MEDICATIONS  (PRN):  acetaminophen     Tablet .. 650 milliGRAM(s) Oral every 6 hours PRN Temp greater or equal to 38C (100.4F), Mild Pain (1 - 3)  albuterol    90 MICROgram(s) HFA Inhaler 2 Puff(s) Inhalation every 6 hours PRN Shortness of Breath and/or Wheezing  aluminum hydroxide/magnesium hydroxide/simethicone Suspension 30 milliLiter(s) Oral every 4 hours PRN Dyspepsia  guaifenesin/dextromethorphan Oral Liquid 10 milliLiter(s) Oral four times a day PRN Cough  melatonin 3 milliGRAM(s) Oral at bedtime PRN Insomnia  ondansetron Injectable 4 milliGRAM(s) IV Push every 8 hours PRN Nausea and/or Vomiting  QUEtiapine 12.5 milliGRAM(s) Oral every 6 hours PRN anxiety      Allergies    codeine (Unknown)  penicillins (Unknown)  morphine (Unknown)    Intolerances        Vital Signs Last 24 Hrs  T(C): 36.8 (17 Jul 2024 07:32), Max: 36.8 (16 Jul 2024 21:14)  T(F): 98.2 (17 Jul 2024 07:32), Max: 98.2 (16 Jul 2024 21:14)  HR: 62 (17 Jul 2024 07:32) (62 - 79)  BP: 125/85 (17 Jul 2024 07:32) (101/68 - 125/85)  BP(mean): --  RR: 18 (17 Jul 2024 07:32) (18 - 18)  SpO2: 97% (17 Jul 2024 07:32) (97% - 98%)    Parameters below as of 17 Jul 2024 07:32  Patient On (Oxygen Delivery Method): room air          PHYSICAL EXAMINATION:    NECK:  Supple. No lymphadenopathy. Jugular venous pressure not elevated. Carotids equal.   HEART:   The cardiac impulse has a normal quality. Reg., Nl S1 and S2.  There are no murmurs, rubs or gallops noted  CHEST:  Chest rhonchi to auscultation. Normal respiratory effort.  ABDOMEN:  Soft and nontender.   EXTREMITIES:  There is no edema.       LABS:                    
7/12: pt c/o sob. cough, difficulty breathing  pulse ox 100% on room air  pt appears anxious  asking for ILR to be checked  does not know her doctors      PHYSICAL EXAM:    Daily Height in cm: 152.4 (11 Jul 2024 13:50)    Daily     Vital Signs Last 24 Hrs  T(C): 36.8 (12 Jul 2024 09:47), Max: 37.1 (11 Jul 2024 13:50)  T(F): 98.2 (12 Jul 2024 09:47), Max: 98.8 (12 Jul 2024 08:35)  HR: 70 (12 Jul 2024 09:47) (61 - 79)  BP: 106/75 (12 Jul 2024 09:47) (104/64 - 133/76)  BP(mean): 92 (11 Jul 2024 17:51) (85 - 92)  RR: 18 (12 Jul 2024 09:47) (14 - 18)  SpO2: 95% (12 Jul 2024 09:47) (95% - 99%)    Constitutional: Weak and ill appearing  HEENT: Atraumatic, JEREMIAS,   Respiratory: Breath Sounds normal, no rhonchi/wheeze  Cardiovascular: N S1S2;   Gastrointestinal: Abdomen soft, non tender, Bowel Sounds present  Extremities: No edema, peripheral pulses present  Neurological: AAO x 3, no gross focal motor deficits  Skin: Non cellulitic, no rash, ulcers  Lymph Nodes: No lymphadenopathy noted  Back: No CVA tenderness   Musculoskeletal: non tender  Breasts: Deferred  Genitourinary: deferred  Rectal: Deferred    All Labs/EKG/Radiology/Meds reviewed by me                          12.0   6.48  )-----------( 287      ( 12 Jul 2024 07:06 )             36.2       CBC Full  -  ( 12 Jul 2024 07:06 )  WBC Count : 6.48 K/uL  RBC Count : 3.82 M/uL  Hemoglobin : 12.0 g/dL  Hematocrit : 36.2 %  Platelet Count - Automated : 287 K/uL  Mean Cell Volume : 94.8 fl  Mean Cell Hemoglobin : 31.4 pg  Mean Cell Hemoglobin Concentration : 33.1 gm/dL  Auto Neutrophil # : x  Auto Lymphocyte # : x  Auto Monocyte # : x  Auto Eosinophil # : x  Auto Basophil # : x  Auto Neutrophil % : x  Auto Lymphocyte % : x  Auto Monocyte % : x  Auto Eosinophil % : x  Auto Basophil % : x      07-12    143  |  111<H>  |  20  ----------------------------<  118<H>  4.3   |  27  |  1.14    Ca    8.6      12 Jul 2024 07:06  Mg     2.1     07-11    TPro  7.3  /  Alb  3.7  /  TBili  0.8  /  DBili  x   /  AST  19  /  ALT  17  /  AlkPhos  76  07-11      LIVER FUNCTIONS - ( 11 Jul 2024 14:54 )  Alb: 3.7 g/dL / Pro: 7.3 gm/dL / ALK PHOS: 76 U/L / ALT: 17 U/L / AST: 19 U/L / GGT: x                       Urinalysis Basic - ( 12 Jul 2024 07:06 )    Color: x / Appearance: x / SG: x / pH: x  Gluc: 118 mg/dL / Ketone: x  / Bili: x / Urobili: x   Blood: x / Protein: x / Nitrite: x   Leuk Esterase: x / RBC: x / WBC x   Sq Epi: x / Non Sq Epi: x / Bacteria: x      < from: CT Angio Chest PE Protocol w/ IV Cont (07.09.24 @ 23:31) >  IMPRESSION: No pulmonary embolism.    Noairspace consolidation.    < end of copied text >  < from: Xray Chest 1 View- PORTABLE-Urgent (07.09.24 @ 21:26) >  IMPRESSION:   No radiographic evidence of active chest disease..    < end of copied text >        MEDICATIONS  (STANDING):  ALPRAZolam 0.25 milliGRAM(s) Oral two times a day  atorvastatin 20 milliGRAM(s) Oral at bedtime  enoxaparin Injectable 40 milliGRAM(s) SubCutaneous every 24 hours  levothyroxine 50 MICROGram(s) Oral daily  pantoprazole    Tablet 40 milliGRAM(s) Oral before breakfast  predniSONE   Tablet 40 milliGRAM(s) Oral daily  sertraline 25 milliGRAM(s) Oral daily    MEDICATIONS  (PRN):  acetaminophen     Tablet .. 650 milliGRAM(s) Oral every 6 hours PRN Temp greater or equal to 38C (100.4F), Mild Pain (1 - 3)  albuterol    90 MICROgram(s) HFA Inhaler 2 Puff(s) Inhalation every 6 hours PRN Shortness of Breath and/or Wheezing  aluminum hydroxide/magnesium hydroxide/simethicone Suspension 30 milliLiter(s) Oral every 4 hours PRN Dyspepsia  guaifenesin/dextromethorphan Oral Liquid 10 milliLiter(s) Oral four times a day PRN Cough  melatonin 3 milliGRAM(s) Oral at bedtime PRN Insomnia  ondansetron Injectable 4 milliGRAM(s) IV Push every 8 hours PRN Nausea and/or Vomiting      
Subjective:    pat better, sitting in chair, no new complaints.    Home Medications:  ALPRAZolam 0.25 mg oral tablet: 1 tab(s) orally 2 times a day (12 Jul 2024 11:57)  atorvastatin 20 mg oral tablet: 1 tab(s) orally once a day (12 Jul 2024 12:03)  Cerefolin oral tablet: 1 tab(s) orally once a day (12 Jul 2024 12:03)  donepezil 10 mg oral tablet: 1 tab(s) orally once a day (12 Jul 2024 11:58)  doxycycline hyclate 100 mg oral capsule: 1 cap(s) orally 2 times a day for 7 days ***NOT COMPLETE*** (12 Jul 2024 11:56)  levothyroxine 50 mcg (0.05 mg) oral tablet: 1 tab(s) orally once a day (12 Jul 2024 11:58)  Medrol Dosepak 4 mg oral tablet: Take as directed, Medrol Dosepak Instructions:  Take 6 tablets by mouth on day 1, then 5 tablets on day 2, then 4 tablets on day 3, then 3 tablets on day 4, then 2 tablets on day 5, then 1 tablet on day 6 ***NOT COMPLETE*** (12 Jul 2024 11:56)  memantine 10 mg oral tablet: 1 tab(s) orally 2 times a day (12 Jul 2024 12:04)  sertraline 25 mg oral tablet: 1 tab(s) orally once a day (12 Jul 2024 11:58)    MEDICATIONS  (STANDING):  ALPRAZolam 0.5 milliGRAM(s) Oral two times a day  atorvastatin 20 milliGRAM(s) Oral at bedtime  enoxaparin Injectable 40 milliGRAM(s) SubCutaneous every 24 hours  levothyroxine 50 MICROGram(s) Oral daily  pantoprazole    Tablet 40 milliGRAM(s) Oral before breakfast  predniSONE   Tablet 40 milliGRAM(s) Oral daily  sertraline 25 milliGRAM(s) Oral daily    MEDICATIONS  (PRN):  acetaminophen     Tablet .. 650 milliGRAM(s) Oral every 6 hours PRN Temp greater or equal to 38C (100.4F), Mild Pain (1 - 3)  albuterol    90 MICROgram(s) HFA Inhaler 2 Puff(s) Inhalation every 6 hours PRN Shortness of Breath and/or Wheezing  aluminum hydroxide/magnesium hydroxide/simethicone Suspension 30 milliLiter(s) Oral every 4 hours PRN Dyspepsia  guaifenesin/dextromethorphan Oral Liquid 10 milliLiter(s) Oral four times a day PRN Cough  melatonin 3 milliGRAM(s) Oral at bedtime PRN Insomnia  ondansetron Injectable 4 milliGRAM(s) IV Push every 8 hours PRN Nausea and/or Vomiting      Allergies    codeine (Unknown)  penicillins (Unknown)  morphine (Unknown)    Intolerances        Vital Signs Last 24 Hrs  T(C): 36.7 (14 Jul 2024 08:36), Max: 36.8 (13 Jul 2024 15:44)  T(F): 98.1 (14 Jul 2024 08:36), Max: 98.2 (13 Jul 2024 15:44)  HR: 64 (14 Jul 2024 08:36) (64 - 80)  BP: 112/87 (14 Jul 2024 08:36) (95/65 - 112/87)  BP(mean): --  RR: 18 (14 Jul 2024 08:36) (18 - 18)  SpO2: 97% (14 Jul 2024 08:36) (95% - 97%)    Parameters below as of 14 Jul 2024 08:36  Patient On (Oxygen Delivery Method): room air          PHYSICAL EXAMINATION:    NECK:  Supple. No lymphadenopathy. Jugular venous pressure not elevated. Carotids equal.   HEART:   The cardiac impulse has a normal quality. Reg., Nl S1 and S2.  There are no murmurs, rubs or gallops noted  CHEST:  Chest rhonchi to auscultation. Normal respiratory effort.  ABDOMEN:  Soft and nontender.   EXTREMITIES:  There is no edema.       LABS:                    
7/12: pt c/o sob. cough, difficulty breathing  pulse ox 100% on room air  pt appears anxious  asking for ILR to be checked  does not know her doctors    7/13: pt c/o anxiety  xanax increased to 0.5 mg bid    7/14: c/o increasing anxiety; Psych consult requested      PHYSICAL EXAM:    Daily Height in cm: 152.4 (11 Jul 2024 13:50)    Daily     Vital Signs Last 24 Hrs  T(C): 36.7 (14 Jul 2024 08:36), Max: 36.8 (13 Jul 2024 15:44)  T(F): 98.1 (14 Jul 2024 08:36), Max: 98.2 (13 Jul 2024 15:44)  HR: 64 (14 Jul 2024 08:36) (64 - 80)  BP: 112/87 (14 Jul 2024 08:36) (95/65 - 112/87)  BP(mean): --  RR: 18 (14 Jul 2024 08:36) (18 - 18)  SpO2: 97% (14 Jul 2024 08:36) (95% - 97%)    Parameters below as of 14 Jul 2024 08:36  Patient On (Oxygen Delivery Method): room air      Constitutional: Weak  appearing  HEENT: Atraumatic, JEREMIAS,   Respiratory: Breath Sounds normal, no rhonchi/wheeze  Cardiovascular: N S1S2;   Gastrointestinal: Abdomen soft, non tender, Bowel Sounds present  Extremities: No edema, peripheral pulses present  Neurological: AAO x 3, no gross focal motor deficits  Skin: Non cellulitic, no rash, ulcers  Lymph Nodes: No lymphadenopathy noted  Back: No CVA tenderness   Musculoskeletal: non tender  Breasts: Deferred  Genitourinary: deferred  Rectal: Deferred    All Labs/EKG/Radiology/Meds reviewed by me                          12.0   6.48  )-----------( 287      ( 12 Jul 2024 07:06 )             36.2       CBC Full  -  ( 12 Jul 2024 07:06 )  WBC Count : 6.48 K/uL  RBC Count : 3.82 M/uL  Hemoglobin : 12.0 g/dL  Hematocrit : 36.2 %  Platelet Count - Automated : 287 K/uL  Mean Cell Volume : 94.8 fl  Mean Cell Hemoglobin : 31.4 pg  Mean Cell Hemoglobin Concentration : 33.1 gm/dL  Auto Neutrophil # : x  Auto Lymphocyte # : x  Auto Monocyte # : x  Auto Eosinophil # : x  Auto Basophil # : x  Auto Neutrophil % : x  Auto Lymphocyte % : x  Auto Monocyte % : x  Auto Eosinophil % : x  Auto Basophil % : x      07-12    143  |  111<H>  |  20  ----------------------------<  118<H>  4.3   |  27  |  1.14    Ca    8.6      12 Jul 2024 07:06  Mg     2.1     07-11    TPro  7.3  /  Alb  3.7  /  TBili  0.8  /  DBili  x   /  AST  19  /  ALT  17  /  AlkPhos  76  07-11      LIVER FUNCTIONS - ( 11 Jul 2024 14:54 )  Alb: 3.7 g/dL / Pro: 7.3 gm/dL / ALK PHOS: 76 U/L / ALT: 17 U/L / AST: 19 U/L / GGT: x                       Urinalysis Basic - ( 12 Jul 2024 07:06 )    Color: x / Appearance: x / SG: x / pH: x  Gluc: 118 mg/dL / Ketone: x  / Bili: x / Urobili: x   Blood: x / Protein: x / Nitrite: x   Leuk Esterase: x / RBC: x / WBC x   Sq Epi: x / Non Sq Epi: x / Bacteria: x      < from: CT Angio Chest PE Protocol w/ IV Cont (07.09.24 @ 23:31) >  IMPRESSION: No pulmonary embolism.    Noairspace consolidation.    < end of copied text >  < from: Xray Chest 1 View- PORTABLE-Urgent (07.09.24 @ 21:26) >  IMPRESSION:   No radiographic evidence of active chest disease..    < end of copied text >        MEDICATIONS  (STANDING):  ALPRAZolam 0.5 milliGRAM(s) Oral two times a day  atorvastatin 20 milliGRAM(s) Oral at bedtime  enoxaparin Injectable 40 milliGRAM(s) SubCutaneous every 24 hours  levothyroxine 50 MICROGram(s) Oral daily  pantoprazole    Tablet 40 milliGRAM(s) Oral before breakfast  predniSONE   Tablet 40 milliGRAM(s) Oral daily  sertraline 25 milliGRAM(s) Oral daily    MEDICATIONS  (PRN):  acetaminophen     Tablet .. 650 milliGRAM(s) Oral every 6 hours PRN Temp greater or equal to 38C (100.4F), Mild Pain (1 - 3)  albuterol    90 MICROgram(s) HFA Inhaler 2 Puff(s) Inhalation every 6 hours PRN Shortness of Breath and/or Wheezing  aluminum hydroxide/magnesium hydroxide/simethicone Suspension 30 milliLiter(s) Oral every 4 hours PRN Dyspepsia  guaifenesin/dextromethorphan Oral Liquid 10 milliLiter(s) Oral four times a day PRN Cough  melatonin 3 milliGRAM(s) Oral at bedtime PRN Insomnia  ondansetron Injectable 4 milliGRAM(s) IV Push every 8 hours PRN Nausea and/or Vomiting      
7/12: pt c/o sob. cough, difficulty breathing  pulse ox 100% on room air  pt appears anxious  asking for ILR to be checked  does not know her doctors    7/13: pt c/o anxiety  xanax increased to 0.5 mg bid    7/14: c/o increasing anxiety; Psych consult requested    7/15: no new complaints  cough better      PHYSICAL EXAM:    Daily Height in cm: 152.4 (11 Jul 2024 13:50)    Daily     Vital Signs Last 24 Hrs  T(C): 36.9 (15 Jul 2024 08:18), Max: 36.9 (15 Jul 2024 08:18)  T(F): 98.4 (15 Jul 2024 08:18), Max: 98.4 (15 Jul 2024 08:18)  HR: 60 (15 Jul 2024 08:18) (60 - 81)  BP: 127/77 (15 Jul 2024 08:18) (101/68 - 127/77)  BP(mean): --  RR: 18 (15 Jul 2024 08:18) (18 - 18)  SpO2: 97% (15 Jul 2024 08:18) (95% - 100%)    Parameters below as of 15 Jul 2024 08:18  Patient On (Oxygen Delivery Method): room air        Constitutional: Weak  appearing  HEENT: Atraumatic, JEREMIAS,   Respiratory: Breath Sounds normal, no rhonchi/wheeze  Cardiovascular: N S1S2;   Gastrointestinal: Abdomen soft, non tender, Bowel Sounds present  Extremities: No edema, peripheral pulses present  Neurological: AAO x 3, no gross focal motor deficits  Skin: Non cellulitic, no rash, ulcers  Lymph Nodes: No lymphadenopathy noted  Back: No CVA tenderness   Musculoskeletal: non tender  Breasts: Deferred  Genitourinary: deferred  Rectal: Deferred    All Labs/EKG/Radiology/Meds reviewed by me                          12.0   6.48  )-----------( 287      ( 12 Jul 2024 07:06 )             36.2       CBC Full  -  ( 12 Jul 2024 07:06 )  WBC Count : 6.48 K/uL  RBC Count : 3.82 M/uL  Hemoglobin : 12.0 g/dL  Hematocrit : 36.2 %  Platelet Count - Automated : 287 K/uL  Mean Cell Volume : 94.8 fl  Mean Cell Hemoglobin : 31.4 pg  Mean Cell Hemoglobin Concentration : 33.1 gm/dL  Auto Neutrophil # : x  Auto Lymphocyte # : x  Auto Monocyte # : x  Auto Eosinophil # : x  Auto Basophil # : x  Auto Neutrophil % : x  Auto Lymphocyte % : x  Auto Monocyte % : x  Auto Eosinophil % : x  Auto Basophil % : x      07-12    143  |  111<H>  |  20  ----------------------------<  118<H>  4.3   |  27  |  1.14    Ca    8.6      12 Jul 2024 07:06  Mg     2.1     07-11    TPro  7.3  /  Alb  3.7  /  TBili  0.8  /  DBili  x   /  AST  19  /  ALT  17  /  AlkPhos  76  07-11      LIVER FUNCTIONS - ( 11 Jul 2024 14:54 )  Alb: 3.7 g/dL / Pro: 7.3 gm/dL / ALK PHOS: 76 U/L / ALT: 17 U/L / AST: 19 U/L / GGT: x                       Urinalysis Basic - ( 12 Jul 2024 07:06 )    Color: x / Appearance: x / SG: x / pH: x  Gluc: 118 mg/dL / Ketone: x  / Bili: x / Urobili: x   Blood: x / Protein: x / Nitrite: x   Leuk Esterase: x / RBC: x / WBC x   Sq Epi: x / Non Sq Epi: x / Bacteria: x      < from: CT Angio Chest PE Protocol w/ IV Cont (07.09.24 @ 23:31) >  IMPRESSION: No pulmonary embolism.    Noairspace consolidation.    < end of copied text >    < from: Xray Chest 1 View- PORTABLE-Urgent (07.09.24 @ 21:26) >  IMPRESSION:   No radiographic evidence of active chest disease..    < end of copied text >      MEDICATIONS  (STANDING):  atorvastatin 20 milliGRAM(s) Oral at bedtime  enoxaparin Injectable 40 milliGRAM(s) SubCutaneous every 24 hours  levothyroxine 50 MICROGram(s) Oral daily  pantoprazole    Tablet 40 milliGRAM(s) Oral before breakfast  sertraline 25 milliGRAM(s) Oral daily    MEDICATIONS  (PRN):  acetaminophen     Tablet .. 650 milliGRAM(s) Oral every 6 hours PRN Temp greater or equal to 38C (100.4F), Mild Pain (1 - 3)  albuterol    90 MICROgram(s) HFA Inhaler 2 Puff(s) Inhalation every 6 hours PRN Shortness of Breath and/or Wheezing  aluminum hydroxide/magnesium hydroxide/simethicone Suspension 30 milliLiter(s) Oral every 4 hours PRN Dyspepsia  guaifenesin/dextromethorphan Oral Liquid 10 milliLiter(s) Oral four times a day PRN Cough  melatonin 3 milliGRAM(s) Oral at bedtime PRN Insomnia  ondansetron Injectable 4 milliGRAM(s) IV Push every 8 hours PRN Nausea and/or Vomiting  QUEtiapine 12.5 milliGRAM(s) Oral every 6 hours PRN anxiety    
Subjective:    pat better, sitting in chair, no new complaint.    Home Medications:  ALPRAZolam 0.25 mg oral tablet: 1 tab(s) orally 2 times a day (12 Jul 2024 11:57)  atorvastatin 20 mg oral tablet: 1 tab(s) orally once a day (12 Jul 2024 12:03)  Cerefolin oral tablet: 1 tab(s) orally once a day (12 Jul 2024 12:03)  donepezil 10 mg oral tablet: 1 tab(s) orally once a day (12 Jul 2024 11:58)  doxycycline hyclate 100 mg oral capsule: 1 cap(s) orally 2 times a day for 7 days ***NOT COMPLETE*** (12 Jul 2024 11:56)  levothyroxine 50 mcg (0.05 mg) oral tablet: 1 tab(s) orally once a day (12 Jul 2024 11:58)  Medrol Dosepak 4 mg oral tablet: Take as directed, Medrol Dosepak Instructions:  Take 6 tablets by mouth on day 1, then 5 tablets on day 2, then 4 tablets on day 3, then 3 tablets on day 4, then 2 tablets on day 5, then 1 tablet on day 6 ***NOT COMPLETE*** (12 Jul 2024 11:56)  memantine 10 mg oral tablet: 1 tab(s) orally 2 times a day (12 Jul 2024 12:04)  sertraline 25 mg oral tablet: 1 tab(s) orally once a day (12 Jul 2024 11:58)    MEDICATIONS  (STANDING):  ALPRAZolam 0.5 milliGRAM(s) Oral two times a day  atorvastatin 20 milliGRAM(s) Oral at bedtime  enoxaparin Injectable 40 milliGRAM(s) SubCutaneous every 24 hours  levothyroxine 50 MICROGram(s) Oral daily  pantoprazole    Tablet 40 milliGRAM(s) Oral before breakfast  predniSONE   Tablet 40 milliGRAM(s) Oral daily  sertraline 25 milliGRAM(s) Oral daily    MEDICATIONS  (PRN):  acetaminophen     Tablet .. 650 milliGRAM(s) Oral every 6 hours PRN Temp greater or equal to 38C (100.4F), Mild Pain (1 - 3)  albuterol    90 MICROgram(s) HFA Inhaler 2 Puff(s) Inhalation every 6 hours PRN Shortness of Breath and/or Wheezing  aluminum hydroxide/magnesium hydroxide/simethicone Suspension 30 milliLiter(s) Oral every 4 hours PRN Dyspepsia  guaifenesin/dextromethorphan Oral Liquid 10 milliLiter(s) Oral four times a day PRN Cough  melatonin 3 milliGRAM(s) Oral at bedtime PRN Insomnia  ondansetron Injectable 4 milliGRAM(s) IV Push every 8 hours PRN Nausea and/or Vomiting      Allergies    codeine (Unknown)  penicillins (Unknown)  morphine (Unknown)    Intolerances        Vital Signs Last 24 Hrs  T(C): 36.7 (13 Jul 2024 08:03), Max: 36.9 (12 Jul 2024 15:42)  T(F): 98.1 (13 Jul 2024 08:03), Max: 98.4 (12 Jul 2024 15:42)  HR: 57 (13 Jul 2024 08:03) (57 - 69)  BP: 128/64 (13 Jul 2024 08:03) (101/65 - 128/64)  BP(mean): --  RR: 18 (13 Jul 2024 08:03) (18 - 18)  SpO2: 98% (13 Jul 2024 08:03) (93% - 98%)    Parameters below as of 13 Jul 2024 08:03  Patient On (Oxygen Delivery Method): room air          PHYSICAL EXAMINATION:    NECK:  Supple. No lymphadenopathy. Jugular venous pressure not elevated. Carotids equal.   HEART:   The cardiac impulse has a normal quality. Reg., Nl S1 and S2.  There are no murmurs, rubs or gallops noted  CHEST:  Chest rhonchi to auscultation. Normal respiratory effort.  ABDOMEN:  Soft and nontender.   EXTREMITIES:  There is no edema.       LABS:                        12.0   6.48  )-----------( 287      ( 12 Jul 2024 07:06 )             36.2     07-12    143  |  111<H>  |  20  ----------------------------<  118<H>  4.3   |  27  |  1.14    Ca    8.6      12 Jul 2024 07:06  Phos  2.7     07-12  Mg     2.1     07-11    TPro  7.3  /  Alb  3.7  /  TBili  0.8  /  DBili  x   /  AST  19  /  ALT  17  /  AlkPhos  76  07-11      Urinalysis Basic - ( 12 Jul 2024 07:06 )    Color: x / Appearance: x / SG: x / pH: x  Gluc: 118 mg/dL / Ketone: x  / Bili: x / Urobili: x   Blood: x / Protein: x / Nitrite: x   Leuk Esterase: x / RBC: x / WBC x   Sq Epi: x / Non Sq Epi: x / Bacteria: x

## 2024-07-17 NOTE — PROGRESS NOTE ADULT - PROVIDER SPECIALTY LIST ADULT
Pulmonology
Pulmonology
Hospitalist
Pulmonology
Pulmonology
Hospitalist
Pulmonology
Hospitalist
Hospitalist

## 2024-07-17 NOTE — PROGRESS NOTE ADULT - ASSESSMENT
The patient is a 78y year old Female with significant PMH of Hypothyroidism, dyslipidemia, Loop recorder in place (patient has not much information about its placement), Anxiety and depression,  presented in ED with c/o shortness of breath for last few days.  Sometimes, she also has right sided chest discomfort. Otherwise, she denies fever, chills, palpitation, cough, leg swelling or leg claudication, N/V, abdominal pain, diarrhea or dysuria. She has not seen her PCP for over a year.  She endorses that she has not been taking her any medications for last couple weeks. She denies any long distance travel.  She also denies h/o COPD or asthma.  Denies smoking, alcohol or substance abuse. She does not know more about her loop recorder when and where was it placed. She was seen in [facility] just 2 days ago for the same on 07/09, and her D-dimer was mildly elevated but CT angio chest was negative for any acute PE or consolidation but it showed subsegmental atelectasis, scarring and calcified granulomas.      Pt admitted with :    # Dyspnea on exertion: likely Bronchitis with anxiety component  She is not hypoxic and saturating 100% on RA.  -CT angio negative for PE and consolidation, but showed subsegmental atelectasis, scarring and calcified granulomas.  -Troponin and BNP negative.   -Admit to medical floor.  -Albuterol inhaler prn.  -May need PFT as outpatient.  -Pulmonary consult appreciated   po prednisone, robitussin   xanax bid  check phos      # Dyslipidemia with h/o Loop recorder in place   -Continue her Atorvastatin.  EP consult for ILR interrogation    # Hypothyroidism.  -On Levothyroxine.  -normal TSH level.    # Anxiety and depression.  -On Sertraline and Alprazolam.    # DVT prophylaxis: Lovenox sq daily.      POC discussed with pt, team, Dr. Segura
The patient is a 78y year old Female with significant PMH of Hypothyroidism, dyslipidemia, Loop recorder in place (patient has not much information about its placement), Anxiety and depression,  presented in ED with c/o shortness of breath for last few days.  Sometimes, she also has right sided chest discomfort. Otherwise, she denies fever, chills, palpitation, cough, leg swelling or leg claudication, N/V, abdominal pain, diarrhea or dysuria. She has not seen her PCP for over a year.  She endorses that she has not been taking her any medications for last couple weeks. She denies any long distance travel.  She also denies h/o COPD or asthma.  Denies smoking, alcohol or substance abuse. She does not know more about her loop recorder when and where was it placed. She was seen in [facility] just 2 days ago for the same on 07/09, and her D-dimer was mildly elevated but CT angio chest was negative for any acute PE or consolidation but it showed subsegmental atelectasis, scarring and calcified granulomas.      Pt admitted with :    # Dyspnea on exertion: likely Bronchitis with anxiety component  She is not hypoxic and saturating 100% on RA.  -CT angio negative for PE and consolidation, but showed subsegmental atelectasis, scarring and calcified granulomas.  -Troponin and BNP negative.   -Admit to medical floor.  -Albuterol inhaler prn.  -May need PFT as outpatient.  -Pulmonary consult appreciated   po prednisone, robitussin   xanax bid  check phos; normal  c/o increasing anxiety; Psych consult requested  d/c prednisone     # Dyslipidemia with h/o Loop recorder in place   -Continue her Atorvastatin.  EP consult for ILR interrogation    # Hypothyroidism.  -On Levothyroxine.  -normal TSH level.    # Anxiety and depression.  -On Sertraline and Alprazolam.  alprazolam increased to 0.5 mg bid and now stopped  Seroquel added per Psych consult      # DVT prophylaxis: Lovenox sq daily.      POC discussed with pt, team,     DISPO: d/c home plan 7/16  
The patient is a 78y year old Female with significant PMH of Hypothyroidism, dyslipidemia, Loop recorder in place (patient has not much information about its placement), Anxiety and depression,  presented in ED with c/o shortness of breath for last few days.  Sometimes, she also has right sided chest discomfort. Otherwise, she denies fever, chills, palpitation, cough, leg swelling or leg claudication, N/V, abdominal pain, diarrhea or dysuria. She has not seen her PCP for over a year.  She endorses that she has not been taking her any medications for last couple weeks. She denies any long distance travel.  She also denies h/o COPD or asthma.  Denies smoking, alcohol or substance abuse. She does not know more about her loop recorder when and where was it placed. She was seen in [facility] just 2 days ago for the same on 07/09, and her D-dimer was mildly elevated but CT angio chest was negative for any acute PE or consolidation but it showed subsegmental atelectasis, scarring and calcified granulomas.      PROBLEMS:    Dyspnea on exertion-likely ASTHMATICBRONCHITIS with anxiety component-She is not hypoxic and saturating 100% on RA.  Subsegmental atelectasis, scarring and calcified granulomas  Dyslipidemia with h/o Loop recorder in place  Hypothyroidism  Anxiety and depression.    PLAN:    Pulmonary stable  Albuterol inhaler prn.  May need PFT as outpatient.  DECD prednisone  Robitussin  Xanax bid  EP consult for ILR interrogation  Anxiety and depression-On Sertraline and Alprazolam.  DVT prophylaxis: Lovenox sq daily  
The patient is a 78y year old Female with significant PMH of Hypothyroidism, dyslipidemia, Loop recorder in place (patient has not much information about its placement), Anxiety and depression,  presented in ED with c/o shortness of breath for last few days.  Sometimes, she also has right sided chest discomfort. Otherwise, she denies fever, chills, palpitation, cough, leg swelling or leg claudication, N/V, abdominal pain, diarrhea or dysuria. She has not seen her PCP for over a year.  She endorses that she has not been taking her any medications for last couple weeks. She denies any long distance travel.  She also denies h/o COPD or asthma.  Denies smoking, alcohol or substance abuse. She does not know more about her loop recorder when and where was it placed. She was seen in [facility] just 2 days ago for the same on 07/09, and her D-dimer was mildly elevated but CT angio chest was negative for any acute PE or consolidation but it showed subsegmental atelectasis, scarring and calcified granulomas.      PROBLEMS:    Dyspnea on exertion-likely ASTHMATICBRONCHITIS with anxiety component-She is not hypoxic and saturating 100% on RA.  Subsegmental atelectasis, scarring and calcified granulomas  Dyslipidemia with h/o Loop recorder in place  Hypothyroidism  Anxiety and depression.    PLAN:    Pulmonary stable  Albuterol inhaler prn.  May need PFT as outpatient.  Po prednisone  Robitussin  Xanax bid  EP consult for ILR interrogation  Anxiety and depression-On Sertraline and Alprazolam.  DVT prophylaxis: Lovenox sq daily  
The patient is a 78y year old Female with significant PMH of Hypothyroidism, dyslipidemia, Loop recorder in place (patient has not much information about its placement), Anxiety and depression,  presented in ED with c/o shortness of breath for last few days.  Sometimes, she also has right sided chest discomfort. Otherwise, she denies fever, chills, palpitation, cough, leg swelling or leg claudication, N/V, abdominal pain, diarrhea or dysuria. She has not seen her PCP for over a year.  She endorses that she has not been taking her any medications for last couple weeks. She denies any long distance travel.  She also denies h/o COPD or asthma.  Denies smoking, alcohol or substance abuse. She does not know more about her loop recorder when and where was it placed. She was seen in [facility] just 2 days ago for the same on 07/09, and her D-dimer was mildly elevated but CT angio chest was negative for any acute PE or consolidation but it showed subsegmental atelectasis, scarring and calcified granulomas.      Pt admitted with :    # Dyspnea on exertion: likely Bronchitis with anxiety component  She is not hypoxic and saturating 100% on RA.  -CT angio negative for PE and consolidation, but showed subsegmental atelectasis, scarring and calcified granulomas.  -Troponin and BNP negative.   -Admit to medical floor.  -Albuterol inhaler prn.  -May need PFT as outpatient.  -Pulmonary consult appreciated   po prednisone, robitussin   xanax bid  check phos; normal  c/o increasing anxiety; Psych consult requested    # Dyslipidemia with h/o Loop recorder in place   -Continue her Atorvastatin.  EP consult for ILR interrogation    # Hypothyroidism.  -On Levothyroxine.  -normal TSH level.    # Anxiety and depression.  -On Sertraline and Alprazolam.  alprazolam increased to 0.5 mg bid    # DVT prophylaxis: Lovenox sq daily.      POC discussed with pt, team,     DISPO: psych consult  
The patient is a 78y year old Female with significant PMH of Hypothyroidism, dyslipidemia, Loop recorder in place (patient has not much information about its placement), Anxiety and depression,  presented in ED with c/o shortness of breath for last few days.  Sometimes, she also has right sided chest discomfort. Otherwise, she denies fever, chills, palpitation, cough, leg swelling or leg claudication, N/V, abdominal pain, diarrhea or dysuria. She has not seen her PCP for over a year.  She endorses that she has not been taking her any medications for last couple weeks. She denies any long distance travel.  She also denies h/o COPD or asthma.  Denies smoking, alcohol or substance abuse. She does not know more about her loop recorder when and where was it placed. She was seen in [facility] just 2 days ago for the same on 07/09, and her D-dimer was mildly elevated but CT angio chest was negative for any acute PE or consolidation but it showed subsegmental atelectasis, scarring and calcified granulomas.      PROBLEMS:    Dyspnea on exertion-likely ASTHMATICBRONCHITIS with anxiety component-She is not hypoxic and saturating 100% on RA.  Subsegmental atelectasis, scarring and calcified granulomas  Dyslipidemia with h/o Loop recorder in place  Hypothyroidism  Anxiety and depression.    PLAN:    Pulmonary stable-decd planning  Albuterol inhaler prn.  May need PFT as outpatient.  DECD prednisone  Robitussin  Xanax bid  Anxiety and depression-On Sertraline and Alprazolam.  DVT prophylaxis: Lovenox sq daily    
The patient is a 78y year old Female with significant PMH of Hypothyroidism, dyslipidemia, Loop recorder in place (patient has not much information about its placement), Anxiety and depression,  presented in ED with c/o shortness of breath for last few days.  Sometimes, she also has right sided chest discomfort. Otherwise, she denies fever, chills, palpitation, cough, leg swelling or leg claudication, N/V, abdominal pain, diarrhea or dysuria. She has not seen her PCP for over a year.  She endorses that she has not been taking her any medications for last couple weeks. She denies any long distance travel.  She also denies h/o COPD or asthma.  Denies smoking, alcohol or substance abuse. She does not know more about her loop recorder when and where was it placed. She was seen in [facility] just 2 days ago for the same on 07/09, and her D-dimer was mildly elevated but CT angio chest was negative for any acute PE or consolidation but it showed subsegmental atelectasis, scarring and calcified granulomas.      PROBLEMS:    Dyspnea on exertion-likely ASTHMATICBRONCHITIS with anxiety component-She is not hypoxic and saturating 100% on RA.  Subsegmental atelectasis, scarring and calcified granulomas  Dyslipidemia with h/o Loop recorder in place  Hypothyroidism  Anxiety and depression.    PLAN:    Pulmonary stable-decd planning  Albuterol inhaler prn.  May need PFT as outpatient.  DECD prednisone  Robitussin  Xanax bid  EP consult for ILR interrogation  Anxiety and depression-On Sertraline and Alprazolam.  DVT prophylaxis: Lovenox sq daily  
The patient is a 78y year old Female with significant PMH of Hypothyroidism, dyslipidemia, Loop recorder in place (patient has not much information about its placement), Anxiety and depression,  presented in ED with c/o shortness of breath for last few days.  Sometimes, she also has right sided chest discomfort. Otherwise, she denies fever, chills, palpitation, cough, leg swelling or leg claudication, N/V, abdominal pain, diarrhea or dysuria. She has not seen her PCP for over a year.  She endorses that she has not been taking her any medications for last couple weeks. She denies any long distance travel.  She also denies h/o COPD or asthma.  Denies smoking, alcohol or substance abuse. She does not know more about her loop recorder when and where was it placed. She was seen in [facility] just 2 days ago for the same on 07/09, and her D-dimer was mildly elevated but CT angio chest was negative for any acute PE or consolidation but it showed subsegmental atelectasis, scarring and calcified granulomas.      PROBLEMS:    Dyspnea on exertion-likely ASTHMATICBRONCHITIS with anxiety component-She is not hypoxic and saturating 100% on RA.  Subsegmental atelectasis, scarring and calcified granulomas  Dyslipidemia with h/o Loop recorder in place  Hypothyroidism  Anxiety and depression.    PLAN:    Pulmonary stable  Albuterol inhaler prn.  May need PFT as outpatient.  Po prednisone  Robitussin  Xanax bid  EP consult for ILR interrogation  Anxiety and depression-On Sertraline and Alprazolam.  DVT prophylaxis: Lovenox sq daily  
The patient is a 78y year old Female with significant PMH of Hypothyroidism, dyslipidemia, Loop recorder in place (patient has not much information about its placement), Anxiety and depression,  presented in ED with c/o shortness of breath for last few days.  Sometimes, she also has right sided chest discomfort. Otherwise, she denies fever, chills, palpitation, cough, leg swelling or leg claudication, N/V, abdominal pain, diarrhea or dysuria. She has not seen her PCP for over a year.  She endorses that she has not been taking her any medications for last couple weeks. She denies any long distance travel.  She also denies h/o COPD or asthma.  Denies smoking, alcohol or substance abuse. She does not know more about her loop recorder when and where was it placed. She was seen in [facility] just 2 days ago for the same on 07/09, and her D-dimer was mildly elevated but CT angio chest was negative for any acute PE or consolidation but it showed subsegmental atelectasis, scarring and calcified granulomas.      Pt admitted with :    # Dyspnea on exertion: likely Bronchitis with anxiety component  She is not hypoxic and saturating 100% on RA.  -CT angio negative for PE and consolidation, but showed subsegmental atelectasis, scarring and calcified granulomas.  -Troponin and BNP negative.   -Admit to medical floor.  -Albuterol inhaler prn.  -May need PFT as outpatient.  -Pulmonary consult appreciated   po prednisone, robitussin   xanax bid  check phos      # Dyslipidemia with h/o Loop recorder in place   -Continue her Atorvastatin.  EP consult for ILR interrogation    # Hypothyroidism.  -On Levothyroxine.  -normal TSH level.    # Anxiety and depression.  -On Sertraline and Alprazolam.  alprazolam increased to 0.5 mg bid    # DVT prophylaxis: Lovenox sq daily.      POC discussed with pt, team, Dr. Segura

## 2024-07-19 ENCOUNTER — APPOINTMENT (OUTPATIENT)
Dept: INTERNAL MEDICINE | Facility: CLINIC | Age: 79
End: 2024-07-19

## 2024-07-25 ENCOUNTER — NON-APPOINTMENT (OUTPATIENT)
Age: 79
End: 2024-07-25

## 2024-07-25 ENCOUNTER — APPOINTMENT (OUTPATIENT)
Dept: ELECTROPHYSIOLOGY | Facility: CLINIC | Age: 79
End: 2024-07-25
Payer: MEDICARE

## 2024-07-25 DIAGNOSIS — E78.5 HYPERLIPIDEMIA, UNSPECIFIED: ICD-10-CM

## 2024-07-25 DIAGNOSIS — I10 ESSENTIAL (PRIMARY) HYPERTENSION: ICD-10-CM

## 2024-07-25 DIAGNOSIS — J40 BRONCHITIS, NOT SPECIFIED AS ACUTE OR CHRONIC: ICD-10-CM

## 2024-07-25 DIAGNOSIS — J98.11 ATELECTASIS: ICD-10-CM

## 2024-07-25 DIAGNOSIS — E03.9 HYPOTHYROIDISM, UNSPECIFIED: ICD-10-CM

## 2024-07-25 DIAGNOSIS — E43 UNSPECIFIED SEVERE PROTEIN-CALORIE MALNUTRITION: ICD-10-CM

## 2024-07-25 DIAGNOSIS — F32.A DEPRESSION, UNSPECIFIED: ICD-10-CM

## 2024-07-25 DIAGNOSIS — Z88.5 ALLERGY STATUS TO NARCOTIC AGENT: ICD-10-CM

## 2024-07-25 DIAGNOSIS — Z88.0 ALLERGY STATUS TO PENICILLIN: ICD-10-CM

## 2024-07-25 DIAGNOSIS — Z95.818 PRESENCE OF OTHER CARDIAC IMPLANTS AND GRAFTS: ICD-10-CM

## 2024-07-25 DIAGNOSIS — F41.9 ANXIETY DISORDER, UNSPECIFIED: ICD-10-CM

## 2024-07-25 PROCEDURE — 93298 REM INTERROG DEV EVAL SCRMS: CPT

## 2024-07-26 PROBLEM — F41.9 ANXIETY DISORDER, UNSPECIFIED: Chronic | Status: ACTIVE | Noted: 2024-07-12

## 2024-07-26 PROBLEM — Z95.818 PRESENCE OF OTHER CARDIAC IMPLANTS AND GRAFTS: Chronic | Status: ACTIVE | Noted: 2024-07-12

## 2024-08-23 NOTE — ASU PREOP CHECKLIST - NS PREOP CHK TEST_COVID_DT_GEN_ALL_CORE
MEDICATIONS  (STANDING):  cholecalciferol Oral Tab/Cap - Peds 2000 Unit(s) Oral at bedtime  cloNIDine  Oral Tab/Cap - Peds 0.2 milliGRAM(s) Oral at bedtime  cloNIDine  Oral Tab/Cap - Peds 0.1 milliGRAM(s) Oral daily  divalproex DR  Oral Tab/Cap - Peds 500 milliGRAM(s) Oral two times a day  methylphenidate ER Oral Tablet (CONCERTA) - Peds 36 milliGRAM(s) Oral daily  OLANZapine  Oral Tab/Cap - Peds 15 milliGRAM(s) Oral at bedtime    MEDICATIONS  (PRN):  acetaminophen   Oral Tab/Cap - Peds. 650 milliGRAM(s) Oral every 6 hours PRN Temp greater or equal to 38 C (100.4 F), Mild Pain (1 - 3), Moderate Pain (4 - 6), Severe Pain (7 - 10)  albuterol  90 MICROgram(s) HFA Inhaler - Peds 2 Puff(s) Inhalation every 4 hours PRN Bronchospasm  chlorproMAZINE  Oral Tab/Cap - Peds 50 milliGRAM(s) Oral four times a day PRN Agitation  diphenhydrAMINE   Oral Tab/Cap - Peds 50 milliGRAM(s) Oral four times a day PRN Threatening behavior  polyethylene glycol 3350 Oral Powder - Peds 17 Gram(s) Oral daily PRN Constipation   28-May-2021 11:40

## 2024-08-30 ENCOUNTER — APPOINTMENT (OUTPATIENT)
Dept: ELECTROPHYSIOLOGY | Facility: CLINIC | Age: 79
End: 2024-08-30
Payer: MEDICARE

## 2024-08-30 ENCOUNTER — NON-APPOINTMENT (OUTPATIENT)
Age: 79
End: 2024-08-30

## 2024-08-30 PROCEDURE — 93298 REM INTERROG DEV EVAL SCRMS: CPT

## 2024-10-03 ENCOUNTER — NON-APPOINTMENT (OUTPATIENT)
Age: 79
End: 2024-10-03

## 2024-10-04 ENCOUNTER — APPOINTMENT (OUTPATIENT)
Dept: ELECTROPHYSIOLOGY | Facility: CLINIC | Age: 79
End: 2024-10-04
Payer: MEDICARE

## 2024-10-04 PROCEDURE — 93298 REM INTERROG DEV EVAL SCRMS: CPT

## 2024-11-08 ENCOUNTER — NON-APPOINTMENT (OUTPATIENT)
Age: 79
End: 2024-11-08

## 2024-11-08 ENCOUNTER — APPOINTMENT (OUTPATIENT)
Dept: ELECTROPHYSIOLOGY | Facility: CLINIC | Age: 79
End: 2024-11-08
Payer: MEDICARE

## 2024-11-08 PROCEDURE — 93298 REM INTERROG DEV EVAL SCRMS: CPT

## 2025-01-14 ENCOUNTER — APPOINTMENT (OUTPATIENT)
Dept: ELECTROPHYSIOLOGY | Facility: CLINIC | Age: 80
End: 2025-01-14

## 2025-01-22 ENCOUNTER — EMERGENCY (EMERGENCY)
Facility: HOSPITAL | Age: 80
LOS: 0 days | Discharge: HOME | End: 2025-01-22
Attending: EMERGENCY MEDICINE
Payer: MEDICARE

## 2025-01-22 VITALS
OXYGEN SATURATION: 100 % | SYSTOLIC BLOOD PRESSURE: 144 MMHG | DIASTOLIC BLOOD PRESSURE: 99 MMHG | RESPIRATION RATE: 18 BRPM | TEMPERATURE: 98 F | HEART RATE: 70 BPM

## 2025-01-22 VITALS
TEMPERATURE: 98 F | RESPIRATION RATE: 19 BRPM | HEART RATE: 69 BPM | SYSTOLIC BLOOD PRESSURE: 129 MMHG | OXYGEN SATURATION: 100 % | DIASTOLIC BLOOD PRESSURE: 81 MMHG

## 2025-01-22 DIAGNOSIS — I10 ESSENTIAL (PRIMARY) HYPERTENSION: ICD-10-CM

## 2025-01-22 DIAGNOSIS — F32.A DEPRESSION, UNSPECIFIED: ICD-10-CM

## 2025-01-22 DIAGNOSIS — R11.0 NAUSEA: ICD-10-CM

## 2025-01-22 DIAGNOSIS — J18.9 PNEUMONIA, UNSPECIFIED ORGANISM: ICD-10-CM

## 2025-01-22 DIAGNOSIS — R06.02 SHORTNESS OF BREATH: ICD-10-CM

## 2025-01-22 DIAGNOSIS — Z95.818 PRESENCE OF OTHER CARDIAC IMPLANTS AND GRAFTS: Chronic | ICD-10-CM

## 2025-01-22 DIAGNOSIS — Z98.890 OTHER SPECIFIED POSTPROCEDURAL STATES: Chronic | ICD-10-CM

## 2025-01-22 DIAGNOSIS — D72.829 ELEVATED WHITE BLOOD CELL COUNT, UNSPECIFIED: ICD-10-CM

## 2025-01-22 DIAGNOSIS — Z88.5 ALLERGY STATUS TO NARCOTIC AGENT: ICD-10-CM

## 2025-01-22 DIAGNOSIS — E03.9 HYPOTHYROIDISM, UNSPECIFIED: ICD-10-CM

## 2025-01-22 DIAGNOSIS — F43.22 ADJUSTMENT DISORDER WITH ANXIETY: ICD-10-CM

## 2025-01-22 DIAGNOSIS — Z88.0 ALLERGY STATUS TO PENICILLIN: ICD-10-CM

## 2025-01-22 DIAGNOSIS — R68.83 CHILLS (WITHOUT FEVER): ICD-10-CM

## 2025-01-22 LAB
ALBUMIN SERPL ELPH-MCNC: 4 G/DL — SIGNIFICANT CHANGE UP (ref 3.3–5)
ALP SERPL-CCNC: 69 U/L — SIGNIFICANT CHANGE UP (ref 40–120)
ALT FLD-CCNC: 12 U/L — SIGNIFICANT CHANGE UP (ref 12–78)
ANION GAP SERPL CALC-SCNC: 7 MMOL/L — SIGNIFICANT CHANGE UP (ref 5–17)
APPEARANCE UR: CLEAR — SIGNIFICANT CHANGE UP
AST SERPL-CCNC: 16 U/L — SIGNIFICANT CHANGE UP (ref 15–37)
BASOPHILS # BLD AUTO: 0.04 K/UL — SIGNIFICANT CHANGE UP (ref 0–0.2)
BASOPHILS NFR BLD AUTO: 0.3 % — SIGNIFICANT CHANGE UP (ref 0–2)
BILIRUB SERPL-MCNC: 0.7 MG/DL — SIGNIFICANT CHANGE UP (ref 0.2–1.2)
BILIRUB UR-MCNC: NEGATIVE — SIGNIFICANT CHANGE UP
BUN SERPL-MCNC: 18 MG/DL — SIGNIFICANT CHANGE UP (ref 7–23)
CALCIUM SERPL-MCNC: 9.3 MG/DL — SIGNIFICANT CHANGE UP (ref 8.5–10.1)
CHLORIDE SERPL-SCNC: 108 MMOL/L — SIGNIFICANT CHANGE UP (ref 96–108)
CO2 SERPL-SCNC: 21 MMOL/L — LOW (ref 22–31)
COLOR SPEC: YELLOW — SIGNIFICANT CHANGE UP
CREAT SERPL-MCNC: 0.81 MG/DL — SIGNIFICANT CHANGE UP (ref 0.5–1.3)
DIFF PNL FLD: NEGATIVE — SIGNIFICANT CHANGE UP
EGFR: 75 ML/MIN/1.73M2 — SIGNIFICANT CHANGE UP
EOSINOPHIL # BLD AUTO: 0.01 K/UL — SIGNIFICANT CHANGE UP (ref 0–0.5)
EOSINOPHIL NFR BLD AUTO: 0.1 % — SIGNIFICANT CHANGE UP (ref 0–6)
FLUAV AG NPH QL: SIGNIFICANT CHANGE UP
FLUBV AG NPH QL: SIGNIFICANT CHANGE UP
GLUCOSE SERPL-MCNC: 115 MG/DL — HIGH (ref 70–99)
GLUCOSE UR QL: NEGATIVE MG/DL — SIGNIFICANT CHANGE UP
HCT VFR BLD CALC: 37.9 % — SIGNIFICANT CHANGE UP (ref 34.5–45)
HGB BLD-MCNC: 12.7 G/DL — SIGNIFICANT CHANGE UP (ref 11.5–15.5)
IMM GRANULOCYTES NFR BLD AUTO: 0.5 % — SIGNIFICANT CHANGE UP (ref 0–0.9)
KETONES UR-MCNC: 15 MG/DL
LACTATE SERPL-SCNC: 1.6 MMOL/L — SIGNIFICANT CHANGE UP (ref 0.7–2)
LACTATE SERPL-SCNC: 2.7 MMOL/L — HIGH (ref 0.7–2)
LEUKOCYTE ESTERASE UR-ACNC: NEGATIVE — SIGNIFICANT CHANGE UP
LYMPHOCYTES # BLD AUTO: 1.12 K/UL — SIGNIFICANT CHANGE UP (ref 1–3.3)
LYMPHOCYTES # BLD AUTO: 7.8 % — LOW (ref 13–44)
MCHC RBC-ENTMCNC: 30.8 PG — SIGNIFICANT CHANGE UP (ref 27–34)
MCHC RBC-ENTMCNC: 33.5 G/DL — SIGNIFICANT CHANGE UP (ref 32–36)
MCV RBC AUTO: 92 FL — SIGNIFICANT CHANGE UP (ref 80–100)
MONOCYTES # BLD AUTO: 0.62 K/UL — SIGNIFICANT CHANGE UP (ref 0–0.9)
MONOCYTES NFR BLD AUTO: 4.3 % — SIGNIFICANT CHANGE UP (ref 2–14)
NEUTROPHILS # BLD AUTO: 12.56 K/UL — HIGH (ref 1.8–7.4)
NEUTROPHILS NFR BLD AUTO: 87 % — HIGH (ref 43–77)
NITRITE UR-MCNC: NEGATIVE — SIGNIFICANT CHANGE UP
NT-PROBNP SERPL-SCNC: 215 PG/ML — SIGNIFICANT CHANGE UP (ref 0–450)
PH UR: 8 — SIGNIFICANT CHANGE UP (ref 5–8)
PLATELET # BLD AUTO: 274 K/UL — SIGNIFICANT CHANGE UP (ref 150–400)
POTASSIUM SERPL-MCNC: 3.6 MMOL/L — SIGNIFICANT CHANGE UP (ref 3.5–5.3)
POTASSIUM SERPL-SCNC: 3.6 MMOL/L — SIGNIFICANT CHANGE UP (ref 3.5–5.3)
PROT SERPL-MCNC: 7.3 GM/DL — SIGNIFICANT CHANGE UP (ref 6–8.3)
PROT UR-MCNC: SIGNIFICANT CHANGE UP MG/DL
RBC # BLD: 4.12 M/UL — SIGNIFICANT CHANGE UP (ref 3.8–5.2)
RBC # FLD: 13.3 % — SIGNIFICANT CHANGE UP (ref 10.3–14.5)
RSV RNA NPH QL NAA+NON-PROBE: SIGNIFICANT CHANGE UP
SARS-COV-2 RNA SPEC QL NAA+PROBE: SIGNIFICANT CHANGE UP
SODIUM SERPL-SCNC: 136 MMOL/L — SIGNIFICANT CHANGE UP (ref 135–145)
SP GR SPEC: 1.01 — SIGNIFICANT CHANGE UP (ref 1–1.03)
TROPONIN I, HIGH SENSITIVITY RESULT: 6.1 NG/L — SIGNIFICANT CHANGE UP
TSH SERPL-MCNC: 0.8 UU/ML — SIGNIFICANT CHANGE UP (ref 0.34–4.82)
UROBILINOGEN FLD QL: 0.2 MG/DL — SIGNIFICANT CHANGE UP (ref 0.2–1)
WBC # BLD: 14.42 K/UL — HIGH (ref 3.8–10.5)
WBC # FLD AUTO: 14.42 K/UL — HIGH (ref 3.8–10.5)

## 2025-01-22 PROCEDURE — 36415 COLL VENOUS BLD VENIPUNCTURE: CPT

## 2025-01-22 PROCEDURE — 81003 URINALYSIS AUTO W/O SCOPE: CPT

## 2025-01-22 PROCEDURE — 74177 CT ABD & PELVIS W/CONTRAST: CPT | Mod: MC

## 2025-01-22 PROCEDURE — 96375 TX/PRO/DX INJ NEW DRUG ADDON: CPT | Mod: XU

## 2025-01-22 PROCEDURE — 93005 ELECTROCARDIOGRAM TRACING: CPT

## 2025-01-22 PROCEDURE — 99285 EMERGENCY DEPT VISIT HI MDM: CPT | Mod: 25

## 2025-01-22 PROCEDURE — 80053 COMPREHEN METABOLIC PANEL: CPT

## 2025-01-22 PROCEDURE — 93010 ELECTROCARDIOGRAM REPORT: CPT

## 2025-01-22 PROCEDURE — 71260 CT THORAX DX C+: CPT | Mod: 26

## 2025-01-22 PROCEDURE — 99285 EMERGENCY DEPT VISIT HI MDM: CPT

## 2025-01-22 PROCEDURE — 84443 ASSAY THYROID STIM HORMONE: CPT

## 2025-01-22 PROCEDURE — 87040 BLOOD CULTURE FOR BACTERIA: CPT

## 2025-01-22 PROCEDURE — 71045 X-RAY EXAM CHEST 1 VIEW: CPT

## 2025-01-22 PROCEDURE — 0241U: CPT

## 2025-01-22 PROCEDURE — 83880 ASSAY OF NATRIURETIC PEPTIDE: CPT

## 2025-01-22 PROCEDURE — 71045 X-RAY EXAM CHEST 1 VIEW: CPT | Mod: 26

## 2025-01-22 PROCEDURE — 71260 CT THORAX DX C+: CPT | Mod: MC

## 2025-01-22 PROCEDURE — 74177 CT ABD & PELVIS W/CONTRAST: CPT | Mod: 26

## 2025-01-22 PROCEDURE — 96374 THER/PROPH/DIAG INJ IV PUSH: CPT | Mod: XU

## 2025-01-22 PROCEDURE — 84484 ASSAY OF TROPONIN QUANT: CPT

## 2025-01-22 PROCEDURE — 83605 ASSAY OF LACTIC ACID: CPT | Mod: 91

## 2025-01-22 PROCEDURE — 85025 COMPLETE CBC W/AUTO DIFF WBC: CPT

## 2025-01-22 RX ORDER — SODIUM CHLORIDE 9 MG/ML
1000 INJECTION, SOLUTION INTRAMUSCULAR; INTRAVENOUS; SUBCUTANEOUS ONCE
Refills: 0 | Status: COMPLETED | OUTPATIENT
Start: 2025-01-22 | End: 2025-01-22

## 2025-01-22 RX ORDER — ONDANSETRON 4 MG/1
4 TABLET ORAL ONCE
Refills: 0 | Status: COMPLETED | OUTPATIENT
Start: 2025-01-22 | End: 2025-01-22

## 2025-01-22 RX ORDER — SODIUM CHLORIDE 9 MG/ML
500 INJECTION, SOLUTION INTRAMUSCULAR; INTRAVENOUS; SUBCUTANEOUS ONCE
Refills: 0 | Status: COMPLETED | OUTPATIENT
Start: 2025-01-22 | End: 2025-01-22

## 2025-01-22 RX ORDER — LEVOFLOXACIN 250 MG
1 TABLET ORAL
Qty: 7 | Refills: 0
Start: 2025-01-22 | End: 2025-01-28

## 2025-01-22 RX ORDER — FAMOTIDINE 20 MG/1
20 TABLET, FILM COATED ORAL ONCE
Refills: 0 | Status: COMPLETED | OUTPATIENT
Start: 2025-01-22 | End: 2025-01-22

## 2025-01-22 RX ADMIN — ONDANSETRON 4 MILLIGRAM(S): 4 TABLET ORAL at 14:40

## 2025-01-22 RX ADMIN — SODIUM CHLORIDE 500 MILLILITER(S): 9 INJECTION, SOLUTION INTRAMUSCULAR; INTRAVENOUS; SUBCUTANEOUS at 14:40

## 2025-01-22 RX ADMIN — FAMOTIDINE 20 MILLIGRAM(S): 20 TABLET, FILM COATED ORAL at 14:42

## 2025-01-22 RX ADMIN — SODIUM CHLORIDE 666.67 MILLILITER(S): 9 INJECTION, SOLUTION INTRAMUSCULAR; INTRAVENOUS; SUBCUTANEOUS at 16:31

## 2025-01-22 NOTE — ED PROVIDER NOTE - OBJECTIVE STATEMENT
78 y/o female w/ PMHx anxiety, loop recorder, depression, hypothyroidism, and HTN presents c/o flu-like symptoms x1d. Pt c/o chills, palpitations, shortness of breath especially on exertion, headache, and nausea, lightheadedness, neck pain, but denies vomiting, cough, chest pain, fever, or diarrhea. Pt reports increased stress and anxiety at home with the recent death of her . No meds taken PTA. No orthopnea. No PND or LE edema. No known ill contacts. PCP/cards at [facility]. NKDA. No other complaints at this time. 80 y/o female w/ PMHx anxiety/depression, hypothyroidism, + ILR, HTN; BIBA from home to ED c/o flu-like symptoms x1d. Pt c/o chills, palpitations, shortness of breath especially on exertion, headache, and nausea, lightheadedness, neck pain, but denies vomiting, cough, chest pain, fever, or diarrhea. Pt reports increased stress and anxiety at home with the recent death of her . No meds taken PTA. No orthopnea. No PND or LE edema. No known ill contacts.  No other complaints at this time.  PCP/Cardiology at [facility].      NKDA.

## 2025-01-22 NOTE — ED PROVIDER NOTE - NSICDXFAMILYHX_GEN_ALL_CORE_FT
FAMILY HISTORY:  Father  Still living? Unknown  FHx: Parkinson's disease, Age at diagnosis: Age Unknown

## 2025-01-22 NOTE — ED ADULT NURSE NOTE - NSFALLHARMRISKINTERV_ED_ALL_ED
134
Assistance OOB with selected safe patient handling equipment if applicable/Assistance with ambulation/Communicate risk of Fall with Harm to all staff, patient, and family/Encourage patient to sit up slowly, dangle for a short time, stand at bedside before walking/Monitor gait and stability/Orthostatic vital signs/Provide visual cue: red socks, yellow wristband, yellow gown, etc/Reinforce activity limits and safety measures with patient and family/Bed in lowest position, wheels locked, appropriate side rails in place/Call bell, personal items and telephone in reach/Instruct patient to call for assistance before getting out of bed/chair/stretcher/Non-slip footwear applied when patient is off stretcher/[place] to call system/Physically safe environment - no spills, clutter or unnecessary equipment/Purposeful Proactive Rounding/Room/bathroom lighting operational, light cord in reach

## 2025-01-22 NOTE — ED PROVIDER NOTE - NSFOLLOWUPINSTRUCTIONS_ED_ALL_ED_FT
Community-Acquired Pneumonia, Adult  Pneumonia is an infection of the lungs. It causes irritation and swelling in the airways of the lungs. Mucus and fluid may also build up inside the airways. This may cause coughing and trouble breathing.    One type of pneumonia can happen while you are in a hospital. A different type can happen when you are not in a hospital (community-acquired pneumonia).    What are the causes?  The human body, showing how a virus travels from the air to a person's lungs.   This condition is caused by germs (viruses, bacteria, or fungi). Some types of germs can spread from person to person. Pneumonia is not thought to spread from person to person.    What increases the risk?  You have a long-term (chronic) disease, such as:  Disease of the lungs. This may be chronic obstructive pulmonary disease (COPD) or asthma.  Heart failure.  Cystic fibrosis.  Diabetes.  Kidney disease.  Sickle cell disease.  HIV.  You have other health problems, such as:  Your body's defense system (immune system) is weak.  A condition that may cause you to breathe in fluids from your mouth and nose.  You had your spleen taken out.  You do not take good care of your teeth and mouth (poor dental hygiene).  You use or have used tobacco products.  You go where the germs that cause this illness are common.  You are older than 65 years of age.  What are the signs or symptoms?  A cough.  A fever.  Sweating or chills.  Chest pain, often when you breathe deeply or cough.  Breathing problems, such as:  Fast breathing.  Trouble breathing.  Shortness of breath.  Feeling tired (fatigued).  Muscle aches.  How is this treated?  Treatment for this condition depends on many things, such as:  The cause of your illness.  Your medicines.  Your other health problems.  Most adults can be treated at home. Sometimes, treatment must happen in a hospital.  Treatment may include medicines to kill germs.  Medicines may depend on which germ caused your illness.  Very bad pneumonia is rare. If you get it, you may:  Have a machine to help you breathe.  Have fluid taken away from around your lungs.  Follow these instructions at home:  Medicines    Take over-the-counter and prescription medicines only as told by your doctor.  Take cough medicine only if you are losing sleep. Cough medicine can keep your body from taking mucus away from your lungs.  If you were prescribed antibiotics, take them as told by your doctor. Do not stop taking them even if you start to feel better.  Lifestyle    A sign showing that a person should not drink alcohol.  A sign showing that a person should not smoke.  Do not smoke or use any products that contain nicotine or tobacco. If you need help quitting, ask your doctor.  Do not drink alcohol.  Eat a healthy diet. This includes a lot of vegetables, fruits, whole grains, low-fat dairy products, and low-fat (lean) protein.  General instructions    A comparison of three sample cups showing dark yellow, yellow, and pale yellow urine.  Rest a lot. Sleep for at least 8 hours each night.  Sleep with your head and neck raised. Put a few pillows under your head or sleep in a reclining chair.  Return to your normal activities as told by your doctor. Ask your doctor what activities are safe for you.  Drink enough fluid to keep your pee (urine) pale yellow.  If your throat is sore, gargle with a mixture of salt and water 3–4 times a day or as needed. To make salt water, completely dissolve ½–1 tsp (3–6 g) of salt in 1 cup (237 mL) of warm water.  Keep all follow-up visits.  How is this prevented?  Getting the pneumonia shot (vaccine). These shots have different types and schedules. Ask your doctor what works best for you. Think about getting this shot if:  You are older than 65 years of age.  You are 19–65 years of age and:  You are being treated for cancer.  You have long-term lung disease.  You have other problems that affect your body's defense system. Ask your doctor if you have one of these.  Getting your flu shot every year. Ask your doctor which type of shot is best for you.  Going to the dentist as often as told.  Washing your hands often with soap and water for at least 20 seconds. If you cannot use soap and water, use hand .  Contact a doctor if:  You have a fever.  You lose sleep because your cough medicine does not help.  Get help right away if:  You are short of breath and this gets worse.  You have more chest pain.  Your sickness gets worse. This is very serious if:  You are an older adult.  Your body's defense system is weak.  You cough up blood.  These symptoms may be an emergency. Get help right away. Call 911.  Do not wait to see if the symptoms will go away.  Do not drive yourself to the hospital.  Summary  Pneumonia is an infection of the lungs.  Community-acquired pneumonia affects people who have not been in the hospital. Certain germs can cause this infection.  This condition may be treated with medicines that kill germs.  For very bad pneumonia, you may need a hospital stay and treatment to help with breathing.  This information is not intended to replace advice given to you by your health care provider. Make sure you discuss any questions you have with your health care provider.    Document Revised: 02/15/2023 Document Reviewed: 02/15/2023  [employer] Patient Education © 2024 [employer]  [employer] logo  Terms and Conditions  Privacy Policy  Editorial Policy  All content on this site: Copyright © 2025 Else[employer], its licensors, and contributors. All rights are reserved, including those for text and data mining, AI training, and similar technologies. For all open access content, the Creative Commons licensing terms apply.  Cookies are used by this site. To decline or learn more, visit our Cooki

## 2025-01-22 NOTE — ED PROVIDER NOTE - PATIENT PORTAL LINK FT
You can access the FollowMyHealth Patient Portal offered by [facility] by registering at the following website: [email or web address]. By joining [employer]’s FollowMyHealth portal, you will also be able to view your health information using other applications (apps) compatible with our system.

## 2025-01-22 NOTE — ED ADULT TRIAGE NOTE - CHIEF COMPLAINT QUOTE
pt bibems c/o flu like symptoms x1 day. pt c/o chills, nausea, denies vomiting/diarrhea. pt also c/o palpitations in chest, taken for EKG. no meds PTA.

## 2025-01-22 NOTE — ED PROVIDER NOTE - AVIAN FLU SYMPTOMS
Please use decongestants over-the-counter, this may help her symptoms    Please use ibuprofen for pain control    Please follow-up with your doctor if her symptoms are not improving, your exam shows no evidence of infection today.  
Yes

## 2025-01-22 NOTE — ED PROVIDER NOTE - PROGRESS NOTE DETAILS
no pna on ct reports may have early infiltrates on xray chest as per radiology no hypoxia, walking well, no sob will dc home with manuel Montero DO

## 2025-01-22 NOTE — ED ADULT NURSE NOTE - OBJECTIVE STATEMENT
pt presents to er c/o flu like symptoms starting today. pt endorsing nausea, chills, cough and congestion. pt denies vomiting/diarrhea, no meds pta. pt also endorsing palpitations, states she has a loop recorder, is unsure why.

## 2025-01-22 NOTE — ED PROVIDER NOTE - CLINICAL SUMMARY MEDICAL DECISION MAKING FREE TEXT BOX
80 y/o female w/ PMHx anxiety, loop recorder, depression, hypothyroidism, and HTN presents c/o flu-like symptoms x1d. Pt c/o chills, palpitations, shortness of breath especially on exertion, headache, and nausea, lightheadedness, neck pain, but denies vomiting, cough, chest pain, fever, or diarrhea. Pt not good historian but not confused/disoriented either. Pt appears with adjustment disorder, having moved out of house 2mos ago that her and her   shared for decades, currently living at a friend's house. Low clinical susp pneumonia, CHF, ACS, PE, asthma, viral illness, but will do workup. Plan EKG, chest x-ray, labs, including pan culture, viral swab, lactate, BMP, troponin, TSH, urinalysis, cautious IV fluids, IV pepcid/zofran, monitor, observe, reassess. Anticipate discharge for outpatient followup this week if results not actionable. 78 y/o female w/ PMHx anxiety, loop recorder, depression, hypothyroidism, and HTN presents c/o flu-like symptoms x1d. Pt c/o chills, palpitations, shortness of breath especially on exertion, headache, and nausea, lightheadedness, neck pain, but denies vomiting, cough, chest pain, fever, or diarrhea. Pt not good historian but not confused/disoriented either. Pt appears with adjustment disorder, having moved out of house 2mos ago that her and her   shared for decades, currently living at a friend's house. Low clinical susp pneumonia, CHF, ACS, PE, asthma, viral illness, but will do workup.     Plan EKG, chest x-ray, labs, including pan culture, viral swab, lactate, BMP, troponin, TSH, urinalysis, cautious IV fluids, IV pepcid/zofran, monitor, observe, reassess. Anticipate discharge for outpatient followup this week if results not actionable.    16:50, NIKOS Nj MD:  CXR wet read personally by me: PEDRO.    Labs results notable for leukocytosis of 14 and high lactate of 2.7; normal troponin, BNP and TSH.   Viral swab negative.  Patient afebrile and appears to be in no acute distress currently.  Source of leukocytosis and high lactate not currently discernible.  CT C/A/P ordered to further evaluate for possible source.  Case  endorsed to Dr. Montero to f/u CTreports,  review rpt. lactate, re-eval pt & dispo. 80 y/o WF, PMH: anxiety/depression, hypothyroid, +ILR, HTN; BIBA from home c/o flu-like symptoms x1d. + Chills, palpitations, shortness of breath especially on exertion, headache, and nausea, lightheadedness, neck pain, but denies vomiting, cough, chest pain, fever, or diarrhea. Pt not good historian but not confused/disoriented either. Pt appears to have + adjustment disorder, having moved out of house 2mos ago that her and her   shared for decades, currently living at a friend's house.     Clinical suspicion high for acute respir. viral illness, not high for pneumonia, CHF, ACS, PE, asthma, viral illness, but will do workup.   Plan: EKG, chest x-ray, labs, including pan-culture, viral swab, lactate, BMP, troponin, TSH, urinalysis, cautious IV fluids, IV pepcid/zofran, monitor, observe, reassess. Anticipate discharge for outpatient followup this week if results not actionable.    16:50, C MD Clem:  CXR wet read personally by me: PEDRO.    Labs results notable for leukocytosis of 14 and high lactate of 2.7; normal troponin, BNP and TSH.   Viral swab negative.  Patient afebrile and appears to be in no acute distress currently.  Source of leukocytosis and high lactate not currently discernible.  CT C/A/P ordered to further evaluate for possible source.  Case  endorsed to Dr. Montero to f/u CTreports,  review rpt. lactate, re-eval pt & dispo.    See Progress Note for case progression incl. CTs reports review & discharge disposition.

## 2025-01-22 NOTE — ED PROVIDER NOTE - PHYSICAL EXAMINATION
Gen: elderly white female, no respiratory discomfort, no sentence shortening, non-ill appearing   Head: NC/AT  Eyes: PERRL, EOMI  ENT: oropharynx clear, mucous membranes mildly dry  Card: regular rate and rhythm, normal radial pulse  Resp: Breath sounds clear bilaterally, respirations normal  Abd: soft, non-tender + bowel sounds normal pitch, no rebound, guarding or mass   Msk: MAEx4 without focal deformities, tenderness or swelling  Skin: no tactile warmth, no rash  Neuro: Alert and oriented, no focal deficits, no motor or sensory deficits, AAOx3, normal speech  : deferred, no CVA tenderness Gen: elderly white female, no respiratory discomfort, no sentence shortening, not acutely ill-appearing   Head: NC/AT  Eyes: PERRL, EOMI  ENT: oropharynx clear, mucous membranes mildly dry  Card: regular rate and rhythm, normal radial pulse  Resp: Breath sounds clear bilaterally, respirations normal  Abd: soft, non-tender + bowel sounds normal pitch, no rebound, guarding or mass   Msk: MAEx4 without focal deformities, tenderness or swelling  Skin: no tactile warmth, no rash  Neuro: Alert and oriented x 3, no focal deficits, no motor or sensory deficits, CNs intact, normal speech  : deferred, no CVA tenderness

## 2025-01-27 LAB
CULTURE RESULTS: SIGNIFICANT CHANGE UP
CULTURE RESULTS: SIGNIFICANT CHANGE UP
SPECIMEN SOURCE: SIGNIFICANT CHANGE UP
SPECIMEN SOURCE: SIGNIFICANT CHANGE UP

## 2025-02-19 ENCOUNTER — APPOINTMENT (OUTPATIENT)
Dept: ELECTROPHYSIOLOGY | Facility: CLINIC | Age: 80
End: 2025-02-19

## 2025-02-21 ENCOUNTER — APPOINTMENT (OUTPATIENT)
Dept: ELECTROPHYSIOLOGY | Facility: CLINIC | Age: 80
End: 2025-02-21

## 2025-03-20 ENCOUNTER — APPOINTMENT (OUTPATIENT)
Dept: ELECTROPHYSIOLOGY | Facility: CLINIC | Age: 80
End: 2025-03-20

## 2025-04-25 ENCOUNTER — APPOINTMENT (OUTPATIENT)
Dept: ELECTROPHYSIOLOGY | Facility: CLINIC | Age: 80
End: 2025-04-25

## 2025-06-18 ENCOUNTER — NON-APPOINTMENT (OUTPATIENT)
Age: 80
End: 2025-06-18

## 2025-07-01 ENCOUNTER — APPOINTMENT (OUTPATIENT)
Dept: ELECTROPHYSIOLOGY | Facility: CLINIC | Age: 80
End: 2025-07-01
Payer: MEDICARE

## 2025-07-01 ENCOUNTER — NON-APPOINTMENT (OUTPATIENT)
Age: 80
End: 2025-07-01

## 2025-07-01 VITALS
DIASTOLIC BLOOD PRESSURE: 72 MMHG | HEART RATE: 68 BPM | WEIGHT: 100 LBS | BODY MASS INDEX: 18.88 KG/M2 | HEIGHT: 61 IN | OXYGEN SATURATION: 97 % | SYSTOLIC BLOOD PRESSURE: 118 MMHG

## 2025-07-01 PROCEDURE — 99212 OFFICE O/P EST SF 10 MIN: CPT

## 2025-07-01 PROCEDURE — 93285 PRGRMG DEV EVAL SCRMS IP: CPT

## 2025-07-08 RX ORDER — SERTRALINE HYDROCHLORIDE 50 MG/1
50 TABLET, FILM COATED ORAL DAILY
Refills: 0 | Status: ACTIVE | COMMUNITY

## 2025-07-08 RX ORDER — ALPRAZOLAM 0.25 MG/1
0.25 TABLET ORAL
Refills: 0 | Status: ACTIVE | COMMUNITY

## 2025-07-08 RX ORDER — SERTRALINE 25 MG/1
25 TABLET, FILM COATED ORAL DAILY
Refills: 0 | Status: ACTIVE | COMMUNITY

## 2025-07-08 RX ORDER — QUETIAPINE FUMARATE 25 MG/1
25 TABLET ORAL
Refills: 0 | Status: ACTIVE | COMMUNITY

## 2025-07-08 RX ORDER — MEMANTINE 10 MG/1
10 TABLET ORAL
Refills: 0 | Status: ACTIVE | COMMUNITY

## 2025-07-08 RX ORDER — TRAZODONE HYDROCHLORIDE 100 MG/1
100 TABLET ORAL
Refills: 0 | Status: ACTIVE | COMMUNITY

## 2025-07-08 RX ORDER — DONEPEZIL HYDROCHLORIDE 10 MG/1
10 TABLET ORAL DAILY
Refills: 0 | Status: ACTIVE | COMMUNITY

## 2025-07-09 NOTE — PATIENT PROFILE ADULT - 
ADDITIONAL INFORMATION
yes patient doesn't remember which vaccine she received or when but she knows she is due for the second shot on June 9th.

## 2025-07-15 NOTE — PHYSICAL THERAPY INITIAL EVALUATION ADULT - MD/RN NOTIFIED
You can access the FollowMyHealth Patient Portal offered by [facility] by registering at the following website: [email or web address]. By joining [employer]’s FollowMyHealth portal, you will also be able to view your health information using other applications (apps) compatible with our system. yes

## 2025-07-25 ENCOUNTER — OUTPATIENT (OUTPATIENT)
Dept: OUTPATIENT SERVICES | Facility: HOSPITAL | Age: 80
LOS: 1 days | Discharge: HOME | End: 2025-07-25
Payer: MEDICARE

## 2025-07-25 VITALS
SYSTOLIC BLOOD PRESSURE: 136 MMHG | OXYGEN SATURATION: 99 % | RESPIRATION RATE: 16 BRPM | HEART RATE: 64 BPM | DIASTOLIC BLOOD PRESSURE: 90 MMHG | HEIGHT: 61 IN | TEMPERATURE: 98 F

## 2025-07-25 VITALS
RESPIRATION RATE: 15 BRPM | OXYGEN SATURATION: 99 % | TEMPERATURE: 97 F | HEART RATE: 63 BPM | SYSTOLIC BLOOD PRESSURE: 123 MMHG | DIASTOLIC BLOOD PRESSURE: 82 MMHG

## 2025-07-25 DIAGNOSIS — Z98.890 OTHER SPECIFIED POSTPROCEDURAL STATES: Chronic | ICD-10-CM

## 2025-07-25 DIAGNOSIS — Z95.818 PRESENCE OF OTHER CARDIAC IMPLANTS AND GRAFTS: Chronic | ICD-10-CM

## 2025-07-25 DIAGNOSIS — Z45.09 ENCOUNTER FOR ADJUSTMENT AND MANAGEMENT OF OTHER CARDIAC DEVICE: ICD-10-CM

## 2025-07-25 PROCEDURE — 33286 RMVL SUBQ CAR RHYTHM MNTR: CPT

## 2025-07-25 NOTE — PACU DISCHARGE NOTE - COMMENTS
Patient with ILR explant performed by Dr. Jorge. Wound class level 1, minimal blood loss, site well approximated. Patient tolerated procedure well, VSS, ROS as charted, remains as previously assessed, see flowsheet for details. Patient and daughter, Nona, given post procedure instructions, verbalized understanding, no further questions. Patient escorted to lobby with daughter, safety maintained at all times.

## 2025-07-25 NOTE — ASU PREOP CHECKLIST - PATIENT'S PERSONAL PROPERTY REMOVED
Pt. Called and  Verified name and . Reviewed labs . Rx sent to pharmacy with instructions . Pt verbalized understanding.     ----- Message from Kelli Fitzgerald CNM sent at 2023  1:40 PM CDT -----  Please notify patient of mild anemia and recommendation for daily iron supplement. Please order to her desired pharmacy thank you     clothing/life alert

## 2025-07-25 NOTE — ASU PATIENT PROFILE, ADULT - FALL HARM RISK - UNIVERSAL INTERVENTIONS
Bed in lowest position, wheels locked, appropriate side rails in place/Call bell, personal items and telephone in reach/Instruct patient to call for assistance before getting out of bed or chair/Non-slip footwear when patient is out of bed/[place] to call system/Physically safe environment - no spills, clutter or unnecessary equipment/Purposeful Proactive Rounding/Room/bathroom lighting operational, light cord in reach

## 2025-07-25 NOTE — PROCEDURAL SAFETY CHECKLIST WITH OR WITHOUT SEDATION - NSPOSTCOMMENTFT_GEN_ALL_CORE
Patient with ILR explant performed by Dr. Jorge. Wound class level 1, minimal blood loss, site well approximated. Patient tolerated procedure well, VSS, ROS as charted, remains as previously assessed. Patient and daughter, Nona, given post procedure instructions, verbalized understanding, no further questions. Patient escorted to lobby with daughter, safety maintained at all times.

## 2025-07-25 NOTE — ASU PREOP CHECKLIST - BP NONINVASIVE DIASTOLIC (MM HG)
----- Message from Ladarius Bernstein MD sent at 12/12/2017  2:52 PM CST -----  Please notify patient of recent lab results and/or use the  \"[facility] FP RESULT LETTER\".     Labwork done the 12th/9/17, shows cholesterol levels are in good control. The good cholesterol, HDL, has improved compared to last year   90

## 2025-07-28 DIAGNOSIS — T82.111A BREAKDOWN (MECHANICAL) OF CARDIAC PULSE GENERATOR (BATTERY), INITIAL ENCOUNTER: ICD-10-CM
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
## 2025-07-30 DIAGNOSIS — Z45.09 ENCOUNTER FOR ADJUSTMENT AND MANAGEMENT OF OTHER CARDIAC DEVICE: ICD-10-CM

## 2025-08-06 ENCOUNTER — APPOINTMENT (OUTPATIENT)
Dept: ELECTROPHYSIOLOGY | Facility: CLINIC | Age: 80
End: 2025-08-06

## 2025-08-13 ENCOUNTER — APPOINTMENT (OUTPATIENT)
Dept: ELECTROPHYSIOLOGY | Facility: CLINIC | Age: 80
End: 2025-08-13

## 2025-08-13 VITALS
SYSTOLIC BLOOD PRESSURE: 132 MMHG | HEART RATE: 65 BPM | DIASTOLIC BLOOD PRESSURE: 85 MMHG | HEIGHT: 61 IN | BODY MASS INDEX: 19.83 KG/M2 | OXYGEN SATURATION: 99 % | WEIGHT: 105 LBS